# Patient Record
Sex: FEMALE | Race: WHITE | NOT HISPANIC OR LATINO | Employment: OTHER | URBAN - METROPOLITAN AREA
[De-identification: names, ages, dates, MRNs, and addresses within clinical notes are randomized per-mention and may not be internally consistent; named-entity substitution may affect disease eponyms.]

---

## 2023-09-15 ENCOUNTER — HOSPITAL ENCOUNTER (OUTPATIENT)
Dept: DATA CONVERSION | Facility: HOSPITAL | Age: 83
Discharge: HOME | End: 2023-09-15
Payer: MEDICARE

## 2023-09-15 DIAGNOSIS — Z87.891 PERSONAL HISTORY OF NICOTINE DEPENDENCE: ICD-10-CM

## 2023-09-15 DIAGNOSIS — S00.93XA CONTUSION OF UNSPECIFIED PART OF HEAD, INITIAL ENCOUNTER: ICD-10-CM
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
## 2023-09-15 DIAGNOSIS — S06.0X9A CONCUSSION WITH LOSS OF CONSCIOUSNESS OF UNSPECIFIED DURATION, INITIAL ENCOUNTER: ICD-10-CM

## 2023-09-15 DIAGNOSIS — S06.9X9A UNSPECIFIED INTRACRANIAL INJURY WITH LOSS OF CONSCIOUSNESS OF UNSPECIFIED DURATION, INITIAL ENCOUNTER (MULTI): ICD-10-CM

## 2023-09-15 DIAGNOSIS — Z79.01 LONG TERM (CURRENT) USE OF ANTICOAGULANTS: ICD-10-CM

## 2023-09-15 LAB
ANION GAP SERPL CALCULATED.3IONS-SCNC: 11 MMOL/L (ref 0–19)
ANTICOAGULANT: NORMAL
BASOPHILS # BLD AUTO: 0.08 K/UL (ref 0–0.22)
BASOPHILS NFR BLD AUTO: 1 % (ref 0–1)
BUN SERPL-MCNC: 19 MG/DL (ref 8–25)
BUN/CREAT SERPL: 38 RATIO (ref 8–21)
CALCIUM SERPL-MCNC: 9.4 MG/DL (ref 8.5–10.4)
CHLORIDE SERPL-SCNC: 102 MMOL/L (ref 97–107)
CO2 SERPL-SCNC: 27 MMOL/L (ref 24–31)
CREAT SERPL-MCNC: 0.5 MG/DL (ref 0.4–1.6)
DEPRECATED RDW RBC AUTO: 39.5 FL (ref 37–54)
DIFFERENTIAL METHOD BLD: ABNORMAL
EOSINOPHIL # BLD AUTO: 0.27 K/UL (ref 0–0.45)
EOSINOPHIL NFR BLD: 3.4 % (ref 0–3)
ERYTHROCYTE [DISTWIDTH] IN BLOOD BY AUTOMATED COUNT: 13.2 % (ref 11.7–15)
GFR SERPL CREATININE-BSD FRML MDRD: 92 ML/MIN/1.73 M2
GLUCOSE SERPL-MCNC: 208 MG/DL (ref 65–99)
HCT VFR BLD AUTO: 37.2 % (ref 36–44)
HGB BLD-MCNC: 11.8 GM/DL (ref 12–15)
IMM GRANULOCYTES # BLD AUTO: 0.05 K/UL (ref 0–0.1)
INR PPP: 1 (ref 0.86–1.16)
LYMPHOCYTES # BLD AUTO: 1.72 K/UL (ref 1.2–3.2)
LYMPHOCYTES NFR BLD MANUAL: 21.5 % (ref 20–40)
MCH RBC QN AUTO: 26.1 PG (ref 26–34)
MCHC RBC AUTO-ENTMCNC: 31.7 % (ref 31–37)
MCV RBC AUTO: 82.3 FL (ref 80–100)
MONOCYTES # BLD AUTO: 0.69 K/UL (ref 0–0.8)
MONOCYTES NFR BLD MANUAL: 8.6 % (ref 0–8)
NEUTROPHILS # BLD AUTO: 5.18 K/UL
NEUTROPHILS # BLD AUTO: 5.18 K/UL (ref 1.8–7.7)
NEUTROPHILS.IMMATURE NFR BLD: 0.6 % (ref 0–1)
NEUTS SEG NFR BLD: 64.9 % (ref 50–70)
NRBC BLD-RTO: 0 /100 WBC
PLATELET # BLD AUTO: 283 K/UL (ref 150–450)
PMV BLD AUTO: 10.4 CU (ref 7–12.6)
POTASSIUM SERPL-SCNC: 4 MMOL/L (ref 3.4–5.1)
PROTHROMBIN TIME: 10.4 SEC (ref 9.3–12.7)
RBC # BLD AUTO: 4.52 M/UL (ref 4–4.9)
SODIUM SERPL-SCNC: 140 MMOL/L (ref 133–145)
WBC # BLD AUTO: 8 K/UL (ref 4.5–11)

## 2023-11-17 ENCOUNTER — NURSING HOME VISIT (OUTPATIENT)
Dept: POST ACUTE CARE | Facility: EXTERNAL LOCATION | Age: 83
End: 2023-11-17
Payer: MEDICARE

## 2023-11-17 DIAGNOSIS — I10 ESSENTIAL HYPERTENSION: ICD-10-CM

## 2023-11-17 DIAGNOSIS — M17.0 PRIMARY OSTEOARTHRITIS OF BOTH KNEES: ICD-10-CM

## 2023-11-17 DIAGNOSIS — R41.0 CONFUSION AND DISORIENTATION: Primary | ICD-10-CM

## 2023-11-17 DIAGNOSIS — F41.9 ANXIETY: ICD-10-CM

## 2023-11-17 DIAGNOSIS — I48.20 CHRONIC ATRIAL FIBRILLATION (MULTI): ICD-10-CM

## 2023-11-17 PROCEDURE — 99349 HOME/RES VST EST MOD MDM 40: CPT

## 2023-11-20 VITALS
DIASTOLIC BLOOD PRESSURE: 68 MMHG | RESPIRATION RATE: 16 BRPM | HEART RATE: 80 BPM | SYSTOLIC BLOOD PRESSURE: 140 MMHG | OXYGEN SATURATION: 99 %

## 2023-11-20 PROBLEM — M16.10 HIP ARTHRITIS: Status: ACTIVE | Noted: 2023-11-20

## 2023-11-20 PROBLEM — R09.89 LABILE HYPERTENSION DUE TO CLINICAL ENVIRONMENT: Status: ACTIVE | Noted: 2023-11-20

## 2023-11-20 PROBLEM — R42 VERTIGO: Status: ACTIVE | Noted: 2023-11-20

## 2023-11-20 PROBLEM — I48.20 CHRONIC ATRIAL FIBRILLATION (MULTI): Status: ACTIVE | Noted: 2023-11-20

## 2023-11-20 PROBLEM — K21.9 GASTROESOPHAGEAL REFLUX DISEASE WITHOUT ESOPHAGITIS: Status: ACTIVE | Noted: 2023-11-20

## 2023-11-20 PROBLEM — M47.816 LUMBAR SPONDYLOSIS: Status: ACTIVE | Noted: 2023-11-20

## 2023-11-20 PROBLEM — R41.3 POOR SHORT TERM MEMORY: Status: ACTIVE | Noted: 2023-11-20

## 2023-11-20 PROBLEM — M81.0 AGE-RELATED OSTEOPOROSIS WITHOUT CURRENT PATHOLOGICAL FRACTURE: Status: ACTIVE | Noted: 2023-11-20

## 2023-11-20 PROBLEM — D64.9 ANEMIA: Status: ACTIVE | Noted: 2023-11-20

## 2023-11-20 PROBLEM — R01.1 HEART MURMUR: Status: ACTIVE | Noted: 2023-11-20

## 2023-11-20 PROBLEM — G47.33 OBSTRUCTIVE SLEEP APNEA SYNDROME: Status: ACTIVE | Noted: 2023-11-20

## 2023-11-20 PROBLEM — F41.9 ANXIETY: Status: ACTIVE | Noted: 2023-11-20

## 2023-11-20 PROBLEM — R41.82 ALTERED MENTAL STATUS: Status: ACTIVE | Noted: 2023-11-20

## 2023-11-20 PROBLEM — M17.0 PRIMARY OSTEOARTHRITIS OF BOTH KNEES: Status: ACTIVE | Noted: 2023-11-20

## 2023-11-20 PROBLEM — E11.9 DIABETES MELLITUS WITHOUT COMPLICATION (MULTI): Status: ACTIVE | Noted: 2023-11-20

## 2023-11-20 PROBLEM — M19.019 SHOULDER ARTHRITIS: Status: ACTIVE | Noted: 2023-11-20

## 2023-11-20 PROBLEM — I10 ESSENTIAL HYPERTENSION: Status: ACTIVE | Noted: 2023-11-20

## 2023-11-20 PROBLEM — D50.8 IRON DEFICIENCY ANEMIA SECONDARY TO INADEQUATE DIETARY IRON INTAKE: Status: ACTIVE | Noted: 2023-11-20

## 2023-11-20 PROBLEM — R40.1 CLOUDED CONSCIOUSNESS: Status: ACTIVE | Noted: 2023-11-20

## 2023-11-20 PROBLEM — I83.93 VARICOSE VEINS OF BOTH LOWER EXTREMITIES: Status: ACTIVE | Noted: 2023-11-20

## 2023-11-20 PROBLEM — E78.2 MIXED HYPERLIPIDEMIA: Status: ACTIVE | Noted: 2023-11-20

## 2023-11-20 PROBLEM — E55.9 VITAMIN D DEFICIENCY: Status: ACTIVE | Noted: 2023-11-20

## 2023-11-20 PROBLEM — I48.0 PAROXYSMAL ATRIAL FIBRILLATION (MULTI): Status: ACTIVE | Noted: 2023-11-20

## 2023-11-20 PROBLEM — E78.5 DYSLIPIDEMIA: Status: ACTIVE | Noted: 2023-11-20

## 2023-11-20 PROBLEM — E03.8 SUBCLINICAL HYPOTHYROIDISM: Status: ACTIVE | Noted: 2023-11-20

## 2023-11-20 PROBLEM — L20.84 INTRINSIC ECZEMA: Status: ACTIVE | Noted: 2023-11-20

## 2023-11-20 PROBLEM — M17.12 PRIMARY OSTEOARTHRITIS OF LEFT KNEE: Status: ACTIVE | Noted: 2023-11-20

## 2023-11-20 RX ORDER — APIXABAN 5 MG/1
TABLET, FILM COATED ORAL 2 TIMES DAILY
Status: ON HOLD | COMMUNITY
Start: 2019-03-06 | End: 2024-03-31 | Stop reason: SINTOL

## 2023-11-20 RX ORDER — ASPIRIN 81 MG/1
81 TABLET ORAL ONCE
COMMUNITY

## 2023-11-20 RX ORDER — METFORMIN HYDROCHLORIDE 500 MG/1
500 TABLET, EXTENDED RELEASE ORAL
COMMUNITY
Start: 2016-06-10

## 2023-11-20 RX ORDER — LANOLIN ALCOHOL/MO/W.PET/CERES
1000 CREAM (GRAM) TOPICAL DAILY
COMMUNITY

## 2023-11-20 RX ORDER — GUAIFENESIN 600 MG/1
600 TABLET, EXTENDED RELEASE ORAL 2 TIMES DAILY PRN
COMMUNITY
End: 2024-04-05 | Stop reason: HOSPADM

## 2023-11-20 RX ORDER — ACETAMINOPHEN 325 MG/1
650 TABLET ORAL EVERY 4 HOURS PRN
COMMUNITY

## 2023-11-20 RX ORDER — ATORVASTATIN CALCIUM 10 MG/1
10 TABLET, FILM COATED ORAL DAILY
COMMUNITY
Start: 2013-05-15

## 2023-11-20 RX ORDER — HYDROXYCHLOROQUINE SULFATE 200 MG/1
200 TABLET ORAL 2 TIMES DAILY
COMMUNITY
Start: 2022-01-10

## 2023-11-20 RX ORDER — HYDRALAZINE HYDROCHLORIDE 25 MG/1
25 TABLET, FILM COATED ORAL 2 TIMES DAILY
COMMUNITY
End: 2024-04-05 | Stop reason: HOSPADM

## 2023-11-20 RX ORDER — ACETAMINOPHEN 500 MG
1 TABLET ORAL DAILY
COMMUNITY
Start: 2013-01-09

## 2023-11-20 RX ORDER — LOSARTAN POTASSIUM 100 MG/1
100 TABLET ORAL DAILY
COMMUNITY
Start: 2017-11-17 | End: 2024-04-05 | Stop reason: HOSPADM

## 2023-11-20 RX ORDER — GUAIFENESIN 100 MG/5ML
200 SOLUTION ORAL EVERY 4 HOURS PRN
COMMUNITY
End: 2024-04-05 | Stop reason: HOSPADM

## 2023-11-20 RX ORDER — ADHESIVE BANDAGE
30 BANDAGE TOPICAL NIGHTLY PRN
COMMUNITY
End: 2024-04-05 | Stop reason: HOSPADM

## 2023-11-20 RX ORDER — ISOSORBIDE MONONITRATE 30 MG/1
30 TABLET, EXTENDED RELEASE ORAL DAILY
COMMUNITY
Start: 2022-05-20

## 2023-11-20 ASSESSMENT — ENCOUNTER SYMPTOMS
FATIGUE: 0
CONSTIPATION: 0
FEVER: 0
CHILLS: 0
FREQUENCY: 0
COUGH: 0
SHORTNESS OF BREATH: 0
CONFUSION: 1
DYSURIA: 0

## 2023-11-21 NOTE — PROGRESS NOTES
"Subjective   Patient ID: Estela Carnes is a 83 y.o. female who is assisted living/ home patient being seen and evaluated for reported increased confusion.     HPI    Pt visited in assisted living for reports of increased confusion and attempting to go into apartments at AL. Pt oriented x1, comfortable and denies pain. Pt does not recall attempting to enter other apartments. Pt denies fever, chills, headache, dizziness. Pt denies voiding symptoms although it was reported pt told nurse she is dribbling and not emptying bladder. Pt unable to answer all questions appropriately. Pt will alternate between answers, and state \"yes, no maybe, well I don't know\". HPI limited d/t cognition. Collateral information obtained from nursing.     Current Outpatient Medications on File Prior to Visit   Medication Sig Dispense Refill    aspirin 81 mg EC tablet Take 1 tablet (81 mg) by mouth 1 time.      atorvastatin (Lipitor) 10 mg tablet Take 1 tablet (10 mg) by mouth once daily.      cholecalciferol (Vitamin D-3) 5,000 Units tablet Take 1 capsule by mouth once daily.      cyanocobalamin (Vitamin B-12) 1,000 mcg tablet Take 1 tablet (1,000 mcg) by mouth once daily.      Eliquis 5 mg tablet Take by mouth 2 times a day.      guaiFENesin (Mucinex) 600 mg 12 hr tablet Take 1 tablet (600 mg) by mouth 2 times a day as needed for cough or congestion. Do not crush, chew, or split.      guaiFENesin (Robitussin) 100 mg/5 mL syrup Take 10 mL (200 mg) by mouth every 4 hours if needed for cough.      hydrALAZINE (Apresoline) 25 mg tablet Take 1 tablet (25 mg) by mouth 2 times a day.      isosorbide mononitrate ER (Imdur) 30 mg 24 hr tablet Take 1 tablet (30 mg) by mouth once daily.      losartan (Cozaar) 100 mg tablet Take 1 tablet (100 mg) by mouth once daily.      magnesium hydroxide (Milk of Magnesia) 400 mg/5 mL suspension Take 30 mL by mouth as needed at bedtime for constipation.      metFORMIN  mg 24 hr tablet Take 1 tablet (500 mg) " by mouth 2 times a day with meals.      PlaqueniL 200 mg tablet Take 1 tablet (200 mg) by mouth 2 times a day.      vit C/E/Zn/coppr/lutein/zeaxan (PRESERVISION AREDS-2 ORAL) Take 1 tablet by mouth once daily.      acetaminophen (Tylenol) 325 mg tablet Take 2 tablets (650 mg) by mouth every 4 hours if needed for headaches, fever (temp greater than 38.0 C) or mild pain (1 - 3).       No current facility-administered medications on file prior to visit.         Review of Systems   Constitutional:  Negative for chills, fatigue and fever.   Eyes:  Negative for visual disturbance.   Respiratory:  Negative for cough and shortness of breath.    Cardiovascular:  Negative for chest pain and leg swelling.   Gastrointestinal:  Negative for constipation.   Genitourinary:  Negative for dysuria and frequency.   Psychiatric/Behavioral:  Positive for confusion.        Objective   There were no vitals taken for this visit.    Physical Exam  Constitutional:       General: She is not in acute distress.  HENT:      Head: Normocephalic.      Nose: Nose normal.      Mouth/Throat:      Mouth: Mucous membranes are moist.   Eyes:      Conjunctiva/sclera: Conjunctivae normal.      Pupils: Pupils are equal, round, and reactive to light.   Cardiovascular:      Rate and Rhythm: Normal rate and regular rhythm.      Pulses: Normal pulses.      Heart sounds: Normal heart sounds.   Pulmonary:      Effort: Pulmonary effort is normal. No respiratory distress.      Breath sounds: Normal breath sounds.   Abdominal:      General: Bowel sounds are normal.   Musculoskeletal:         General: No swelling. Normal range of motion.      Cervical back: Normal range of motion.   Skin:     General: Skin is warm.      Capillary Refill: Capillary refill takes less than 2 seconds.   Neurological:      Mental Status: She is alert. She is disoriented.      Comments: Oriented x1, appears to be at baseline today.    Psychiatric:         Mood and Affect: Mood normal.          Assessment/Plan   Diagnoses and all orders for this visit:  Confusion and disorientation  Comments:  Facility to obtain UA/Culture  Chronic atrial fibrillation (CMS/HCC)  Comments:  Heart rate controlled at today's visit.  Essential hypertension  Comments:  Blood pressure controlled.  Anxiety  Comments:  Continue to monitor for signs/symptoms of anxiety.  Primary osteoarthritis of both knees  Comments:  Pain controlled. Pt currently working with PT.

## 2023-12-15 ENCOUNTER — NURSING HOME VISIT (OUTPATIENT)
Dept: POST ACUTE CARE | Facility: EXTERNAL LOCATION | Age: 83
End: 2023-12-15
Payer: MEDICARE

## 2023-12-15 VITALS
OXYGEN SATURATION: 99 % | HEART RATE: 91 BPM | RESPIRATION RATE: 16 BRPM | DIASTOLIC BLOOD PRESSURE: 74 MMHG | SYSTOLIC BLOOD PRESSURE: 143 MMHG

## 2023-12-15 DIAGNOSIS — E11.9 DIABETES MELLITUS WITHOUT COMPLICATION (MULTI): ICD-10-CM

## 2023-12-15 DIAGNOSIS — E55.9 VITAMIN D DEFICIENCY: ICD-10-CM

## 2023-12-15 DIAGNOSIS — R41.3 POOR SHORT TERM MEMORY: Primary | ICD-10-CM

## 2023-12-15 DIAGNOSIS — E78.2 MIXED HYPERLIPIDEMIA: ICD-10-CM

## 2023-12-15 DIAGNOSIS — M15.9 PRIMARY OSTEOARTHRITIS INVOLVING MULTIPLE JOINTS: ICD-10-CM

## 2023-12-15 PROCEDURE — 99349 HOME/RES VST EST MOD MDM 40: CPT

## 2023-12-15 ASSESSMENT — ENCOUNTER SYMPTOMS
CONSTIPATION: 0
SHORTNESS OF BREATH: 0
FATIGUE: 0
RHINORRHEA: 0
HEADACHES: 0
FREQUENCY: 0
CHILLS: 0
WEAKNESS: 1
ARTHRALGIAS: 1
DYSURIA: 0
COUGH: 0
FEVER: 0
MYALGIAS: 1
SLEEP DISTURBANCE: 0
DIZZINESS: 0
CONFUSION: 1

## 2023-12-15 ASSESSMENT — PAIN SCALES - GENERAL: PAINLEVEL: 0-NO PAIN

## 2023-12-16 NOTE — PROGRESS NOTES
Subjective   Patient ID: Estela Carnes is a 83 y.o. female who is being seen at [facility] for Annual AL Re-certification.     HPI   Pt visited in apartment for Annual AL Re-certification assessment. Pt oriented x1, comfortable and denies pain. Pt unable to state year born right away, and looked to spouse whether or not she was correct. Pt wears glasses, denies changes in vision and hearing. Pt denies difficulty with chewing and swallowing, admits appetite is good. Pt denies fever, chills, headache, dizziness, congestion and runny nose. Spouse states pt's cognition is declining, and memory isn't what it use to be.     Pt denies chest pain, and sob at rest and exertion. Pt denies voiding symptoms and constipation. Nursing reports been has been incontinent of urine. Pt ambulates with walker, and is currently working with physical therapy. Pt to get a new walker with brakes so she doesn't fall forward with current walker. Pt to work with PT to learn how to operate the hand brakes, and determine if appropriate for patient due to patient's impaired cognition. Pt denies sleep disturbances. Pt denies feelings of anxiety and sadness.     Current Outpatient Medications on File Prior to Visit   Medication Sig Dispense Refill    acetaminophen (Tylenol) 325 mg tablet Take 2 tablets (650 mg) by mouth every 4 hours if needed for headaches, fever (temp greater than 38.0 C) or mild pain (1 - 3).      aspirin 81 mg EC tablet Take 1 tablet (81 mg) by mouth 1 time.      atorvastatin (Lipitor) 10 mg tablet Take 1 tablet (10 mg) by mouth once daily.      cholecalciferol (Vitamin D-3) 5,000 Units tablet Take 1 capsule by mouth once daily.      cyanocobalamin (Vitamin B-12) 1,000 mcg tablet Take 1 tablet (1,000 mcg) by mouth once daily.      Eliquis 5 mg tablet Take by mouth 2 times a day.      guaiFENesin (Mucinex) 600 mg 12 hr tablet Take 1 tablet (600 mg) by mouth 2 times a day as needed for cough or congestion. Do not  crush, chew, or split.      guaiFENesin (Robitussin) 100 mg/5 mL syrup Take 10 mL (200 mg) by mouth every 4 hours if needed for cough.      hydrALAZINE (Apresoline) 25 mg tablet Take 1 tablet (25 mg) by mouth 2 times a day.      isosorbide mononitrate ER (Imdur) 30 mg 24 hr tablet Take 1 tablet (30 mg) by mouth once daily.      losartan (Cozaar) 100 mg tablet Take 1 tablet (100 mg) by mouth once daily.      magnesium hydroxide (Milk of Magnesia) 400 mg/5 mL suspension Take 30 mL by mouth as needed at bedtime for constipation.      metFORMIN  mg 24 hr tablet Take 1 tablet (500 mg) by mouth 2 times a day with meals.      PlaqueniL 200 mg tablet Take 1 tablet (200 mg) by mouth 2 times a day.      vit C/E/Zn/coppr/lutein/zeaxan (PRESERVISION AREDS-2 ORAL) Take 1 tablet by mouth once daily.       No current facility-administered medications on file prior to visit.      Review of Systems   Constitutional:  Negative for chills, fatigue and fever.   HENT:  Negative for congestion and rhinorrhea.    Eyes:  Negative for visual disturbance.   Respiratory:  Negative for cough and shortness of breath.    Cardiovascular:  Negative for chest pain and leg swelling.   Gastrointestinal:  Negative for constipation.   Genitourinary:  Negative for dysuria and frequency.   Musculoskeletal:  Positive for arthralgias, gait problem and myalgias.   Neurological:  Positive for weakness. Negative for dizziness and headaches.   Psychiatric/Behavioral:  Positive for confusion. Negative for sleep disturbance.        Objective   /74   Pulse 91   Resp 16   SpO2 99%     Physical Exam  Constitutional:       General: She is not in acute distress.  HENT:      Head: Normocephalic.      Nose: Nose normal. No congestion or rhinorrhea.      Mouth/Throat:      Mouth: Mucous membranes are moist.   Eyes:      Conjunctiva/sclera: Conjunctivae normal.      Pupils: Pupils are equal, round, and reactive to light.   Cardiovascular:      Rate and  Rhythm: Normal rate and regular rhythm.      Pulses: Normal pulses.      Heart sounds: Normal heart sounds.   Pulmonary:      Effort: Pulmonary effort is normal. No respiratory distress.      Breath sounds: Normal breath sounds. No wheezing or rhonchi.   Abdominal:      General: Bowel sounds are normal.   Musculoskeletal:         General: No swelling. Normal range of motion.      Cervical back: Normal range of motion.      Right lower leg: No edema.      Left lower leg: No edema.   Skin:     General: Skin is warm.      Capillary Refill: Capillary refill takes less than 2 seconds.   Neurological:      Mental Status: She is alert. Mental status is at baseline. She is disoriented.      Comments: Oriented x1, appears to be at baseline today.    Psychiatric:         Attention and Perception: Attention normal.         Mood and Affect: Mood normal.         Speech: Speech normal.         Cognition and Memory: Cognition is impaired. Memory is impaired.         Assessment/Plan   Diagnoses and all orders for this visit:  Poor short term memory  Comments:  Continue to monitor for cognitive decline.  Primary osteoarthritis involving multiple joints  Comments:  Continue working with PT.  Diabetes mellitus without complication (CMS/[facility])  Comments:  Facility to obtain HgbA1c.  Mixed hyperlipidemia  Comments:  Facility to obtain Lipid panel.  Vitamin D deficiency  Comments:  Facility to obtain Vit D level.

## 2023-12-28 ENCOUNTER — APPOINTMENT (OUTPATIENT)
Dept: RADIOLOGY | Facility: HOSPITAL | Age: 83
End: 2023-12-28
Payer: MEDICARE

## 2023-12-28 ENCOUNTER — APPOINTMENT (OUTPATIENT)
Dept: CARDIOLOGY | Facility: HOSPITAL | Age: 83
End: 2023-12-28
Payer: MEDICARE

## 2023-12-28 ENCOUNTER — HOSPITAL ENCOUNTER (EMERGENCY)
Facility: HOSPITAL | Age: 83
Discharge: HOME | End: 2023-12-28
Attending: EMERGENCY MEDICINE
Payer: MEDICARE

## 2023-12-28 VITALS
RESPIRATION RATE: 15 BRPM | DIASTOLIC BLOOD PRESSURE: 68 MMHG | SYSTOLIC BLOOD PRESSURE: 145 MMHG | BODY MASS INDEX: 26.5 KG/M2 | HEART RATE: 79 BPM | HEIGHT: 64 IN | WEIGHT: 155.2 LBS | OXYGEN SATURATION: 99 % | TEMPERATURE: 98.4 F

## 2023-12-28 DIAGNOSIS — R55 SYNCOPE AND COLLAPSE: ICD-10-CM

## 2023-12-28 DIAGNOSIS — M54.50 ACUTE RIGHT-SIDED LOW BACK PAIN WITHOUT SCIATICA: Primary | ICD-10-CM

## 2023-12-28 DIAGNOSIS — R10.9 ABDOMINAL PAIN, UNSPECIFIED ABDOMINAL LOCATION: ICD-10-CM

## 2023-12-28 LAB
ALBUMIN SERPL-MCNC: 3.6 G/DL (ref 3.5–5)
ALP BLD-CCNC: 96 U/L (ref 35–125)
ALT SERPL-CCNC: 20 U/L (ref 5–40)
ANION GAP SERPL CALC-SCNC: 14 MMOL/L
APPEARANCE UR: CLEAR
AST SERPL-CCNC: 29 U/L (ref 5–40)
BASOPHILS # BLD AUTO: 0.08 X10*3/UL (ref 0–0.1)
BASOPHILS NFR BLD AUTO: 0.7 %
BILIRUB SERPL-MCNC: 0.3 MG/DL (ref 0.1–1.2)
BILIRUB UR STRIP.AUTO-MCNC: NEGATIVE MG/DL
BUN SERPL-MCNC: 15 MG/DL (ref 8–25)
CALCIUM SERPL-MCNC: 9.2 MG/DL (ref 8.5–10.4)
CHLORIDE SERPL-SCNC: 101 MMOL/L (ref 97–107)
CO2 SERPL-SCNC: 25 MMOL/L (ref 24–31)
COLOR UR: ABNORMAL
CREAT SERPL-MCNC: 0.6 MG/DL (ref 0.4–1.6)
EOSINOPHIL # BLD AUTO: 0.29 X10*3/UL (ref 0–0.4)
EOSINOPHIL NFR BLD AUTO: 2.4 %
ERYTHROCYTE [DISTWIDTH] IN BLOOD BY AUTOMATED COUNT: 12.6 % (ref 11.5–14.5)
GFR SERPL CREATININE-BSD FRML MDRD: 89 ML/MIN/1.73M*2
GLUCOSE BLD MANUAL STRIP-MCNC: 114 MG/DL (ref 74–99)
GLUCOSE SERPL-MCNC: 280 MG/DL (ref 65–99)
GLUCOSE UR STRIP.AUTO-MCNC: NORMAL MG/DL
HCT VFR BLD AUTO: 36.4 % (ref 36–46)
HGB BLD-MCNC: 11.8 G/DL (ref 12–16)
IMM GRANULOCYTES # BLD AUTO: 0.04 X10*3/UL (ref 0–0.5)
IMM GRANULOCYTES NFR BLD AUTO: 0.3 % (ref 0–0.9)
KETONES UR STRIP.AUTO-MCNC: NEGATIVE MG/DL
LACTATE BLDV-SCNC: 2 MMOL/L (ref 0.4–2)
LEUKOCYTE ESTERASE UR QL STRIP.AUTO: NEGATIVE
LIPASE SERPL-CCNC: 25 U/L (ref 16–63)
LYMPHOCYTES # BLD AUTO: 1.62 X10*3/UL (ref 0.8–3)
LYMPHOCYTES NFR BLD AUTO: 13.4 %
MCH RBC QN AUTO: 27.1 PG (ref 26–34)
MCHC RBC AUTO-ENTMCNC: 32.4 G/DL (ref 32–36)
MCV RBC AUTO: 84 FL (ref 80–100)
MONOCYTES # BLD AUTO: 0.9 X10*3/UL (ref 0.05–0.8)
MONOCYTES NFR BLD AUTO: 7.5 %
NEUTROPHILS # BLD AUTO: 9.14 X10*3/UL (ref 1.6–5.5)
NEUTROPHILS NFR BLD AUTO: 75.7 %
NITRITE UR QL STRIP.AUTO: NEGATIVE
NRBC BLD-RTO: 0 /100 WBCS (ref 0–0)
PH UR STRIP.AUTO: 7 [PH]
PLATELET # BLD AUTO: 303 X10*3/UL (ref 150–450)
POTASSIUM SERPL-SCNC: 4.2 MMOL/L (ref 3.4–5.1)
PROT SERPL-MCNC: 7.1 G/DL (ref 5.9–7.9)
PROT UR STRIP.AUTO-MCNC: ABNORMAL MG/DL
RBC # BLD AUTO: 4.36 X10*6/UL (ref 4–5.2)
RBC # UR STRIP.AUTO: NEGATIVE /UL
RBC #/AREA URNS AUTO: NORMAL /HPF
SODIUM SERPL-SCNC: 140 MMOL/L (ref 133–145)
SP GR UR STRIP.AUTO: 1.05
TROPONIN T SERPL-MCNC: 37 NG/L
TROPONIN T SERPL-MCNC: 37 NG/L
TROPONIN T SERPL-MCNC: 41 NG/L
UROBILINOGEN UR STRIP.AUTO-MCNC: NORMAL MG/DL
WBC # BLD AUTO: 12.1 X10*3/UL (ref 4.4–11.3)
WBC #/AREA URNS AUTO: NORMAL /HPF

## 2023-12-28 PROCEDURE — 84484 ASSAY OF TROPONIN QUANT: CPT | Mod: 91 | Performed by: EMERGENCY MEDICINE

## 2023-12-28 PROCEDURE — 70450 CT HEAD/BRAIN W/O DYE: CPT

## 2023-12-28 PROCEDURE — 2550000001 HC RX 255 CONTRASTS: Performed by: EMERGENCY MEDICINE

## 2023-12-28 PROCEDURE — 83690 ASSAY OF LIPASE: CPT | Performed by: EMERGENCY MEDICINE

## 2023-12-28 PROCEDURE — 84484 ASSAY OF TROPONIN QUANT: CPT | Performed by: EMERGENCY MEDICINE

## 2023-12-28 PROCEDURE — 36415 COLL VENOUS BLD VENIPUNCTURE: CPT | Performed by: EMERGENCY MEDICINE

## 2023-12-28 PROCEDURE — 93005 ELECTROCARDIOGRAM TRACING: CPT

## 2023-12-28 PROCEDURE — 80053 COMPREHEN METABOLIC PANEL: CPT | Performed by: EMERGENCY MEDICINE

## 2023-12-28 PROCEDURE — 99285 EMERGENCY DEPT VISIT HI MDM: CPT | Performed by: EMERGENCY MEDICINE

## 2023-12-28 PROCEDURE — 85025 COMPLETE CBC W/AUTO DIFF WBC: CPT | Performed by: EMERGENCY MEDICINE

## 2023-12-28 PROCEDURE — 82947 ASSAY GLUCOSE BLOOD QUANT: CPT

## 2023-12-28 PROCEDURE — 81003 URINALYSIS AUTO W/O SCOPE: CPT | Performed by: EMERGENCY MEDICINE

## 2023-12-28 PROCEDURE — 83605 ASSAY OF LACTIC ACID: CPT | Performed by: EMERGENCY MEDICINE

## 2023-12-28 PROCEDURE — 74177 CT ABD & PELVIS W/CONTRAST: CPT

## 2023-12-28 PROCEDURE — 2500000005 HC RX 250 GENERAL PHARMACY W/O HCPCS: Performed by: EMERGENCY MEDICINE

## 2023-12-28 RX ORDER — LIDOCAINE 560 MG/1
1 PATCH PERCUTANEOUS; TOPICAL; TRANSDERMAL ONCE
Status: DISCONTINUED | OUTPATIENT
Start: 2023-12-28 | End: 2023-12-28 | Stop reason: HOSPADM

## 2023-12-28 RX ADMIN — IOHEXOL 75 ML: 350 INJECTION, SOLUTION INTRAVENOUS at 11:25

## 2023-12-28 RX ADMIN — LIDOCAINE 1 PATCH: 4 PATCH TOPICAL at 16:45

## 2023-12-28 SDOH — SOCIAL STABILITY: SOCIAL INSECURITY
WITHIN THE LAST YEAR, HAVE TO BEEN RAPED OR FORCED TO HAVE ANY KIND OF SEXUAL ACTIVITY BY YOUR PARTNER OR EX-PARTNER?: NO

## 2023-12-28 SDOH — SOCIAL STABILITY: SOCIAL INSECURITY: WITHIN THE LAST YEAR, HAVE YOU BEEN AFRAID OF YOUR PARTNER OR EX-PARTNER?: NO

## 2023-12-28 SDOH — ECONOMIC STABILITY: TRANSPORTATION INSECURITY
IN THE PAST 12 MONTHS, HAS THE LACK OF TRANSPORTATION KEPT YOU FROM MEDICAL APPOINTMENTS OR FROM GETTING MEDICATIONS?: NO

## 2023-12-28 SDOH — ECONOMIC STABILITY: HOUSING INSECURITY
IN THE LAST 12 MONTHS, WAS THERE A TIME WHEN YOU DID NOT HAVE A STEADY PLACE TO SLEEP OR SLEPT IN A SHELTER (INCLUDING NOW)?: NO

## 2023-12-28 SDOH — ECONOMIC STABILITY: INCOME INSECURITY: IN THE PAST 12 MONTHS, HAS THE ELECTRIC, GAS, OIL, OR WATER COMPANY THREATENED TO SHUT OFF SERVICE IN YOUR HOME?: NO

## 2023-12-28 SDOH — ECONOMIC STABILITY: INCOME INSECURITY: IN THE LAST 12 MONTHS, WAS THERE A TIME WHEN YOU WERE NOT ABLE TO PAY THE MORTGAGE OR RENT ON TIME?: NO

## 2023-12-28 SDOH — SOCIAL STABILITY: SOCIAL INSECURITY: WITHIN THE LAST YEAR, HAVE YOU BEEN HUMILIATED OR EMOTIONALLY ABUSED IN OTHER WAYS BY YOUR PARTNER OR EX-PARTNER?: NO

## 2023-12-28 SDOH — ECONOMIC STABILITY: FOOD INSECURITY: WITHIN THE PAST 12 MONTHS, THE FOOD YOU BOUGHT JUST DIDN'T LAST AND YOU DIDN'T HAVE MONEY TO GET MORE.: NEVER TRUE

## 2023-12-28 SDOH — ECONOMIC STABILITY: FOOD INSECURITY: WITHIN THE PAST 12 MONTHS, YOU WORRIED THAT YOUR FOOD WOULD RUN OUT BEFORE YOU GOT MONEY TO BUY MORE.: NEVER TRUE

## 2023-12-28 SDOH — SOCIAL STABILITY: SOCIAL INSECURITY
WITHIN THE LAST YEAR, HAVE YOU BEEN KICKED, HIT, SLAPPED, OR OTHERWISE PHYSICALLY HURT BY YOUR PARTNER OR EX-PARTNER?: NO

## 2023-12-28 SDOH — ECONOMIC STABILITY: TRANSPORTATION INSECURITY
IN THE PAST 12 MONTHS, HAS LACK OF TRANSPORTATION KEPT YOU FROM MEETINGS, WORK, OR FROM GETTING THINGS NEEDED FOR DAILY LIVING?: NO

## 2023-12-28 ASSESSMENT — PAIN - FUNCTIONAL ASSESSMENT: PAIN_FUNCTIONAL_ASSESSMENT: 0-10

## 2023-12-28 NOTE — PROGRESS NOTES
"   12/28/23 1425   Housing Stability   In the last 12 months, was there a time when you were not able to pay the mortgage or rent on time? N   In the last 12 months, was there a time when you did not have a steady place to sleep or slept in a shelter (including now)? N   Transportation Needs   In the past 12 months, has lack of transportation kept you from medical appointments or from getting medications? no   In the past 12 months, has lack of transportation kept you from meetings, work, or from getting things needed for daily living? No   Food Insecurity   Within the past 12 months, you worried that your food would run out before you got the money to buy more. Never true   Within the past 12 months, the food you bought just didn't last and you didn't have money to get more. Never true   Intimate Partner Violence   Within the last year, have you been afraid of your partner or ex-partner? No   Within the last year, have you been humiliated or emotionally abused in other ways by your partner or ex-partner? No   Within the last year, have you been kicked, hit, slapped, or otherwise physically hurt by your partner or ex-partner? No   Within the last year, have you been raped or forced to have any kind of sexual activity by your partner or ex-partner? No   Utilities   In the past 12 months has the electric, gas, oil, or water company threatened to shut off services in your home? No         Estela Carnes is a 83 y.o. female on day 0 of admission presenting with No Principal Problem: There is no principal problem currently on the Problem List. Please update the Problem List and refresh..    Subjective          Objective     Physical Exam    Last Recorded Vitals  Blood pressure 156/73, pulse 72, temperature 36.7 °C (98 °F), resp. rate 16, height 1.626 m (5' 4\"), weight 70.4 kg (155 lb 3.3 oz), SpO2 99 %.  Intake/Output last 3 Shifts:  No intake/output data recorded.    Relevant Results                       "       Assessment/Plan   Active Problems:  There are no active Hospital Problems.               SENAIT White

## 2023-12-28 NOTE — PROGRESS NOTES
TCSW met [facility] with pt and spouse.  TCSW introduced self and discussed role.  Spouse reports that he and pt reside at [facility] in [place].  Spouse states pt utilizes a walker and has aides from [facility] that assist her with bathing.  Spouse reports pt is able to get dressed on her own.  Spouse states that pt is currently active with PT/OT at [facility] 3 times a week.  TCSW discussed with spouse and pt tentative post discharge recommendations.  Spouse states he wants to wait and see what is wrong with pt first before deciding HHC vs SNF if needed.  TC will continue to follow.

## 2023-12-28 NOTE — ED PROVIDER NOTES
HPI   Chief Complaint   Patient presents with    Syncope    Abdominal Pain     Pt to ER via EMS from [facility] after syncopal episode @ facility. Staff told EMS pt was c/o abdominal pain last night then this morning. Pt initially 80/40 for EMS, after 250 mL of NS IV pt 114/62. Pt arrives to ER AOX1, unknown baseline. Pt able to follow commands. Bruise LUE noted, pt on eliquis.        83-year-old female presents emergency department for evaluation for syncopal episode and abdominal pain.  Per staff the patient has been complaining of abdominal pain since last night as per report from EMS.  Unsure of the syncopal episode if it was witnessed.  Unsure if there was head trauma.  Patient has no complaints of headache.  She is complaining of right lower back gluteal pain that has been ongoing.      History provided by:  Patient and EMS personnel    Review of Systems   Gastrointestinal:  Positive for abdominal pain. Negative for constipation, diarrhea, nausea and vomiting.   Genitourinary:  Negative for difficulty urinating and dysuria.   Musculoskeletal:  Positive for back pain.   Neurological:  Positive for syncope. Negative for dizziness, facial asymmetry, light-headedness and headaches.   All other systems reviewed and are negative.        Patient History   No past medical history on file.  Past Surgical History:   Procedure Laterality Date    CT GUIDED IMAGING FOR NEEDLE PLACEMENT  6/1/2018    CT GUIDED IMAGING FOR NEEDLE PLACEMENT [facility]    CT GUIDED IMAGING FOR NEEDLE PLACEMENT  6/17/2019    CT GUIDED IMAGING FOR NEEDLE PLACEMENT [facility]    MR HEAD ANGIO WO IV CONTRAST  10/7/2021    MR HEAD ANGIO WO IV CONTRAST [facility]    MR NECK ANGIO WO IV CONTRAST  10/7/2021    MR NECK ANGIO WO IV CONTRAST [facility]     No family history on file.  Social History     Tobacco Use    Smoking status: Never     Passive exposure: Never    Smokeless tobacco: Never   Substance Use Topics     Alcohol use: Not on file    Drug use: Never       Physical Exam   ED Triage Vitals [12/28/23 1021]   Temp Heart Rate Resp BP   36.7 °C (98 °F) 86 16 157/64      SpO2 Temp src Heart Rate Source Patient Position   96 % -- -- --      BP Location FiO2 (%)     -- --       Physical Exam  Vitals reviewed.   Constitutional:       General: She is not in acute distress.     Appearance: She is well-developed. She is obese. She is not ill-appearing or diaphoretic.   HENT:      Head: Normocephalic and atraumatic.      Mouth/Throat:      Mouth: Mucous membranes are moist.   Cardiovascular:      Rate and Rhythm: Normal rate and regular rhythm.   Abdominal:      General: There is no distension. There are no signs of injury.      Palpations: Abdomen is soft. There is no mass or pulsatile mass.   Musculoskeletal:      Cervical back: Normal range of motion. No tenderness or bony tenderness.      Thoracic back: No signs of trauma or bony tenderness.      Lumbar back: Spasms present. No bony tenderness. Normal range of motion.        Back:    Skin:     General: Skin is warm and dry.   Neurological:      Mental Status: She is alert. Mental status is at baseline.         ED Course & MDM   ED Course as of 12/29/23 2242   Fri Dec 29, 2023   2242 CT head wo IV contrast  FINDINGS:  Ventricular enlargement is observed with proportionate deepening and  widening of the sulci and sylvian fissures. Diminished density within  supratentorial white matter indicates chronic microvascular ischemic  disease.  There is lacunar infarct of the left basal ganglia which is  old. No mass effect, intracranial hemorrhage, or extra-axial fluid  collection is demonstrated.      There is mucous membrane thickening within both maxillary sinuses.      IMPRESSION:  Age-appropriate atrophy and chronic microvascular ischemic disease  with old lacunar infarct of the left basal ganglia.      Bilateral chronic maxillary sinusitis.      Signed by: Ela Ruano 12/28/2023  12:13 PM   [TJ]   2242 CT chest abdomen pelvis w IV contrast  IMPRESSION:  CHEST  1.  Stable lobulated soft tissue density within lateral segment of  the right middle lobe. This was previously biopsied.  2. Cardiomegaly and coronary artery calcification.  3. Old healed anterior right rib fractures.  4. Moderately small hiatal hernia.      ABDOMEN - PELVIS  1.  Dilated pancreatic duct measuring 4 mm with 2 subcentimeter  hypodense lesion suggested within the proximal pancreatic body. If  clinically indicated these could be further evaluated with MRI of the  pancreas with contrast administration.  2. Diverticulosis of the colon.  3. Fibroid uterus without enlargement.  4. Stable 1.2 cm right adrenal mass.  5. Status post vertebroplasty of L5.          MACRO:  None      Signed by: Ela Ruano 12/28/2023 12:52 PM   [TJ]      ED Course User Index  [TJ] Betsy LOZANO MD         Diagnoses as of 12/29/23 2242   Abdominal pain, unspecified abdominal location   Acute right-sided low back pain without sciatica   Syncope and collapse       Medical Decision Making  Syncope   Abdominal pain  There unsure if the patient struck her head.  She is on Eliquis.  CT head without contrast ordered.  CT chest/abdomen/pelvis ordered.    Labs all within normal limits with exception of elevated troponin.  Repeat troponin was unchanged.  Mildly elevated white count.  No evidence of infection a urinalysis but an elevated specific gravity.    Results discussed with the patient and family at bedside.  Lidoderm patch ordered for site of muscle spasms of the lower back.  Outpatient management, follow-up instructions, and discharge instructions provided.    Amount and/or Complexity of Data Reviewed  Labs: ordered. Decision-making details documented in ED Course.  Radiology: ordered. Decision-making details documented in ED Course.  ECG/medicine tests: ordered and independent interpretation performed.     Details: Twelve-lead EKG reviewed and  interpreted by myself  Rate 85, sinus rhythm, left axis deviation, no LVH, intervals within normal limits, no ST segment elevations or depressions, no hyperacute or inverted T waves, no ectopic beats        Procedure  Procedures     Betsy LOZANO MD  12/29/23 0998

## 2023-12-28 NOTE — PROGRESS NOTES
"   12/28/23 1426   Discharge Planning   Living Arrangements Spouse/significant other   Support Systems Spouse/significant other;Family members   Type of Residence Assisted living   Patient expects to be discharged to: TBD         Estela Carnes is a 83 y.o. female on day 0 of admission presenting with No Principal Problem: There is no principal problem currently on the Problem List. Please update the Problem List and refresh..    Subjective          Objective     Physical Exam    Last Recorded Vitals  Blood pressure 156/73, pulse 72, temperature 36.7 °C (98 °F), resp. rate 16, height 1.626 m (5' 4\"), weight 70.4 kg (155 lb 3.3 oz), SpO2 99 %.  Intake/Output last 3 Shifts:  No intake/output data recorded.    Relevant Results                             Assessment/Plan   Active Problems:  There are no active Hospital Problems.               SENAIT White      "

## 2023-12-29 ENCOUNTER — NURSING HOME VISIT (OUTPATIENT)
Dept: POST ACUTE CARE | Facility: EXTERNAL LOCATION | Age: 83
End: 2023-12-29
Payer: MEDICARE

## 2023-12-29 DIAGNOSIS — M15.9 PRIMARY OSTEOARTHRITIS INVOLVING MULTIPLE JOINTS: ICD-10-CM

## 2023-12-29 DIAGNOSIS — R40.1 CLOUDED CONSCIOUSNESS: ICD-10-CM

## 2023-12-29 DIAGNOSIS — I10 ESSENTIAL HYPERTENSION: ICD-10-CM

## 2023-12-29 DIAGNOSIS — R55 SYNCOPE, UNSPECIFIED SYNCOPE TYPE: Primary | ICD-10-CM

## 2023-12-29 LAB — HOLD SPECIMEN: NORMAL

## 2023-12-29 PROCEDURE — 99349 HOME/RES VST EST MOD MDM 40: CPT

## 2023-12-29 ASSESSMENT — ENCOUNTER SYMPTOMS
FACIAL ASYMMETRY: 0
NAUSEA: 0
CONSTIPATION: 0
HEADACHES: 0
DYSURIA: 0
DIFFICULTY URINATING: 0
ABDOMINAL PAIN: 1
BACK PAIN: 1
DIARRHEA: 0
LIGHT-HEADEDNESS: 0
DIZZINESS: 0
VOMITING: 0

## 2023-12-29 ASSESSMENT — PAIN SCALES - GENERAL: PAINLEVEL: 0-NO PAIN

## 2023-12-31 LAB
ATRIAL RATE: 85 BPM
P AXIS: 70 DEGREES
P OFFSET: 176 MS
P ONSET: 143 MS
PR INTERVAL: 146 MS
Q ONSET: 216 MS
QRS COUNT: 13 BEATS
QRS DURATION: 118 MS
QT INTERVAL: 432 MS
QTC CALCULATION(BAZETT): 514 MS
QTC FREDERICIA: 485 MS
R AXIS: -30 DEGREES
T AXIS: 21 DEGREES
T OFFSET: 432 MS
VENTRICULAR RATE: 85 BPM

## 2024-01-01 VITALS — DIASTOLIC BLOOD PRESSURE: 62 MMHG | HEART RATE: 65 BPM | RESPIRATION RATE: 16 BRPM | SYSTOLIC BLOOD PRESSURE: 136 MMHG

## 2024-01-01 ASSESSMENT — ENCOUNTER SYMPTOMS
FREQUENCY: 0
RHINORRHEA: 0
DIZZINESS: 0
CONFUSION: 1
ARTHRALGIAS: 1
MYALGIAS: 1
HEADACHES: 0
CONSTIPATION: 0
VOMITING: 0
FEVER: 0
NAUSEA: 0
LIGHT-HEADEDNESS: 0
CHILLS: 0
SLEEP DISTURBANCE: 0
WEAKNESS: 1
COUGH: 0
DYSURIA: 0
SHORTNESS OF BREATH: 0
FATIGUE: 0

## 2024-01-01 NOTE — PROGRESS NOTES
"Subjective   Patient ID: Estela Carnes is a 83 y.o. female who is assisted living/ home patient being seen at [facility] for a follow up to ER visit for a syncopal episode.     HPI     Pt visited in apartment for a follow up after a visit in the ER for a syncopal episode. Pt with no significant findings after a series of tests were performed and returned back to the assisted living where she resides with spouse. Pt oriented x1, cannot recall year born, or present year. This is baseline for patient. Spouse present for visit. Pt vaguely recalls going to the ER yesterday after \"passing out\". Spouse states pt was sitting at the kitchen table, and then next thing he knew she was slumped over, and not responding. Pt had reported hypotension per nursing at that time, and squad was called. When spouse retelling events, pt states \"What I did? That happened? I don't remember that?\". Today pt denies headache, changes in vision from baseline, dizziness, nausea/vomiting, fever, and chills. Pt denies chest pain, palpitations, and sob at rest and exertion. Pt denies voiding symptoms and constipation. Pt ambulating with walker. Denies any recent falls. Pt denies sleep disturbances. Pt states \"I feel pretty good today\". Spouse states pt ate cereal, coffee, and juice this morning, and appears to be herself today.     Current Outpatient Medications on File Prior to Visit   Medication Sig Dispense Refill    acetaminophen (Tylenol) 325 mg tablet Take 2 tablets (650 mg) by mouth every 4 hours if needed for headaches, fever (temp greater than 38.0 C) or mild pain (1 - 3).      aspirin 81 mg EC tablet Take 1 tablet (81 mg) by mouth 1 time.      atorvastatin (Lipitor) 10 mg tablet Take 1 tablet (10 mg) by mouth once daily.      cholecalciferol (Vitamin D-3) 5,000 Units tablet Take 1 capsule by mouth once daily.      cyanocobalamin (Vitamin B-12) 1,000 mcg tablet Take 1 tablet (1,000 mcg) by mouth once daily.      Eliquis 5 mg " tablet Take by mouth 2 times a day.      guaiFENesin (Mucinex) 600 mg 12 hr tablet Take 1 tablet (600 mg) by mouth 2 times a day as needed for cough or congestion. Do not crush, chew, or split.      guaiFENesin (Robitussin) 100 mg/5 mL syrup Take 10 mL (200 mg) by mouth every 4 hours if needed for cough.      hydrALAZINE (Apresoline) 25 mg tablet Take 1 tablet (25 mg) by mouth 2 times a day.      isosorbide mononitrate ER (Imdur) 30 mg 24 hr tablet Take 1 tablet (30 mg) by mouth once daily.      losartan (Cozaar) 100 mg tablet Take 1 tablet (100 mg) by mouth once daily.      magnesium hydroxide (Milk of Magnesia) 400 mg/5 mL suspension Take 30 mL by mouth as needed at bedtime for constipation.      metFORMIN  mg 24 hr tablet Take 1 tablet (500 mg) by mouth 2 times a day with meals.      PlaqueniL 200 mg tablet Take 1 tablet (200 mg) by mouth 2 times a day.      vit C/E/Zn/coppr/lutein/zeaxan (PRESERVISION AREDS-2 ORAL) Take 1 tablet by mouth once daily.       No current facility-administered medications on file prior to visit.      Review of Systems   Constitutional:  Negative for chills, fatigue and fever.   HENT:  Negative for congestion and rhinorrhea.    Eyes:  Negative for visual disturbance.   Respiratory:  Negative for cough and shortness of breath.    Cardiovascular:  Negative for chest pain and leg swelling.   Gastrointestinal:  Negative for constipation, nausea and vomiting.   Genitourinary:  Negative for dysuria and frequency.   Musculoskeletal:  Positive for arthralgias, gait problem and myalgias.   Neurological:  Positive for weakness. Negative for dizziness, syncope, light-headedness and headaches.   Psychiatric/Behavioral:  Positive for confusion. Negative for sleep disturbance.          Objective   /62 (BP Location: Left arm, Patient Position: Sitting, BP Cuff Size: Adult)   Pulse 65   Resp 16 Comment: 98% oxygen saturation    Physical Exam  Constitutional:       General: She is not in  acute distress.  HENT:      Head: Normocephalic.      Nose: Nose normal. No congestion or rhinorrhea.      Mouth/Throat:      Mouth: Mucous membranes are moist.   Eyes:      Conjunctiva/sclera: Conjunctivae normal.      Pupils: Pupils are equal, round, and reactive to light.   Cardiovascular:      Rate and Rhythm: Normal rate and regular rhythm.      Pulses: Normal pulses.      Heart sounds: Normal heart sounds.   Pulmonary:      Effort: Pulmonary effort is normal. No respiratory distress.      Breath sounds: Normal breath sounds. No wheezing or rhonchi.   Abdominal:      General: Bowel sounds are normal.   Musculoskeletal:         General: No swelling. Normal range of motion.      Cervical back: Normal range of motion.      Right lower leg: No edema.      Left lower leg: No edema.   Skin:     General: Skin is warm.      Capillary Refill: Capillary refill takes less than 2 seconds.   Neurological:      Mental Status: She is alert. Mental status is at baseline.      Comments: Oriented x1, appears to be at baseline today.    Psychiatric:         Attention and Perception: Attention normal.         Mood and Affect: Mood normal.         Speech: Speech normal.         Cognition and Memory: Cognition is impaired. Memory is impaired.         Assessment/Plan   Diagnoses and all orders for this visit:  Syncope, unspecified syncope type  Comments:  Asymptomatic  Essential hypertension  Comments:  Blood pressure controlled.  Primary osteoarthritis involving multiple joints  Comments:  Pain controlled.  Clouded consciousness  Comments:  Pt is at baseline.

## 2024-01-07 PROBLEM — S92.309A CLOSED FRACTURE OF METATARSAL BONE: Status: RESOLVED | Noted: 2024-01-07 | Resolved: 2024-01-07

## 2024-01-07 PROBLEM — N93.9 VAGINAL SPOTTING: Status: ACTIVE | Noted: 2024-01-07

## 2024-01-07 PROBLEM — J32.9 SINUSITIS: Status: ACTIVE | Noted: 2024-01-07

## 2024-01-07 PROBLEM — R91.1 LUNG NODULE: Status: ACTIVE | Noted: 2024-01-07

## 2024-01-07 PROBLEM — G47.30 SLEEP APNEA: Status: ACTIVE | Noted: 2024-01-07

## 2024-01-07 PROBLEM — N95.0 POSTMENOPAUSAL BLEEDING: Status: ACTIVE | Noted: 2024-01-07

## 2024-01-07 PROBLEM — M17.11 PRIMARY OSTEOARTHRITIS OF RIGHT KNEE: Status: ACTIVE | Noted: 2024-01-07

## 2024-01-07 PROBLEM — E78.5 HYPERLIPIDEMIA: Status: ACTIVE | Noted: 2024-01-07

## 2024-03-19 ENCOUNTER — OFFICE VISIT (OUTPATIENT)
Dept: ORTHOPEDIC SURGERY | Facility: CLINIC | Age: 84
End: 2024-03-19
Payer: MEDICARE

## 2024-03-19 ENCOUNTER — HOSPITAL ENCOUNTER (OUTPATIENT)
Dept: RADIOLOGY | Facility: CLINIC | Age: 84
Discharge: HOME | End: 2024-03-19
Payer: MEDICARE

## 2024-03-19 DIAGNOSIS — M25.562 ACUTE PAIN OF LEFT KNEE: ICD-10-CM

## 2024-03-19 DIAGNOSIS — Z96.652 HISTORY OF LEFT KNEE REPLACEMENT: Primary | ICD-10-CM

## 2024-03-19 PROCEDURE — 99203 OFFICE O/P NEW LOW 30 MIN: CPT | Performed by: STUDENT IN AN ORGANIZED HEALTH CARE EDUCATION/TRAINING PROGRAM

## 2024-03-19 PROCEDURE — 1159F MED LIST DOCD IN RCRD: CPT | Performed by: STUDENT IN AN ORGANIZED HEALTH CARE EDUCATION/TRAINING PROGRAM

## 2024-03-19 PROCEDURE — 1036F TOBACCO NON-USER: CPT | Performed by: STUDENT IN AN ORGANIZED HEALTH CARE EDUCATION/TRAINING PROGRAM

## 2024-03-19 PROCEDURE — 73564 X-RAY EXAM KNEE 4 OR MORE: CPT | Mod: LT

## 2024-03-19 PROCEDURE — 73564 X-RAY EXAM KNEE 4 OR MORE: CPT | Mod: LEFT SIDE | Performed by: RADIOLOGY

## 2024-03-19 PROCEDURE — 1157F ADVNC CARE PLAN IN RCRD: CPT | Performed by: STUDENT IN AN ORGANIZED HEALTH CARE EDUCATION/TRAINING PROGRAM

## 2024-03-19 NOTE — PROGRESS NOTES
Estela Carnes  is a 83 y.o. year-old  female. she  is a new patient to our office and presents with a chief complaint of Left  knee pain.  She is here with her .  They note a history of a previous knee replacement they are unsure with whom and when.  Patient is somewhat of a poor historian and is really not clear on why she is here today although her  states she has been having some pain in the knee.  There is been no new injury.  She does have some difficulty walking at home and utilizes a walker.      Past Medical, Family, and Social History reviewed   Review of Systems  A complete review of systems was conducted, pertinent only to the HPI noted above.  Constitutional: None  Eyes: No additions to above history  Ears, Nose, Throat: No additions to above history  Cardiovascular: No additions to above history  Respiratory: No additions to above history  GI: No additions to above history  : No additions to above history  Skin/Neuro: No additions to above history  Endocrine/Heme/Lymph: No additions to above history  Immunologic: No additions to above history  Psychiatric: No additions to above history  Musculoskeletal: see above    GEN: Alert and Oriented x 3  Constitutional: Well appearing , in no apparent distress.  Skin: No rashes, erythema, or induration around knee healed midline incision with no concern of infection    MUSCULOSKELETAL EXAM:     Left KNEE:  ROM: 0/0/130  Effusion: negative  Alignment: [neutral]      Gait: [normal]  Sensation intact bilaterally sural/saphenous/sp/dp/tibial nerve bilaterally  Motor 5/5 knee flexion/extension/foot DF/PF/EHL/FHL bilaterally  Palpable/symmetric DP and PT pulse bilaterally      PATELLAR/EXTENSOR MECHANISM:   KNEE:  Patellar Crepitus: n  Patellar Compression Pain:n  Patellar Apprehension: [no]  Extensor Mechanism: [intact]  Straight Leg Raise: good      MENISCUS EXAM:  Joint Line Tenderness: No discrete tenderness  McMurrays: [negative]  Pain with Deep  Flexion: [No]    Xrays independently viewed and interpreted: Total knee replacement without evidence of loosening or failure    I reviewed with the patient that I do not routinely do total knee replacements but did review their x-rays which does not demonstrate any issues that I can see.  I have given her prescription for therapy to help with gait training.  We have also given them the name of one of our joint replacement partners for follow-up on her knee replacement if she continues to have issue.  All questions were answered they are in agreement with this plan

## 2024-03-25 ENCOUNTER — TELEPHONE (OUTPATIENT)
Dept: PRIMARY CARE | Facility: CLINIC | Age: 84
End: 2024-03-25
Payer: MEDICARE

## 2024-03-25 DIAGNOSIS — K05.00 ACUTE GINGIVITIS: Primary | ICD-10-CM

## 2024-03-25 RX ORDER — CHLORHEXIDINE GLUCONATE ORAL RINSE 1.2 MG/ML
15 SOLUTION DENTAL EVERY 12 HOURS
Qty: 420 ML | Refills: 0 | Status: SHIPPED | OUTPATIENT
Start: 2024-03-25 | End: 2024-04-05 | Stop reason: HOSPADM

## 2024-03-25 NOTE — TELEPHONE ENCOUNTER
Spoke to [facility], pt with bleeding gums, gingivitis. Poor hygiene. No other symptoms. Encouraged brushing 2-3x daily with soft toothbrush. Pt unable to floss. Will prescribe chlorhexidine 0.12% po oral rinse.

## 2024-03-25 NOTE — TELEPHONE ENCOUNTER
Patient noticed to have dried blood around her mouth when nurse went to give medications this morning.  Patient does take Eliquis twice daily.  Last visit to dentist was two weeks ago.  Nurse Latosha did call dentist to inquire what procedure was done, patient only had a teeth cleaning, dentist said to reach out to Primary Provider.  Additionally Latosha reports she saw this last week also but was told patient went to dentist.  Please Advise.

## 2024-03-28 ENCOUNTER — APPOINTMENT (OUTPATIENT)
Dept: RADIOLOGY | Facility: HOSPITAL | Age: 84
DRG: 204 | End: 2024-03-28
Payer: MEDICARE

## 2024-03-28 ENCOUNTER — HOSPITAL ENCOUNTER (INPATIENT)
Facility: HOSPITAL | Age: 84
LOS: 1 days | Discharge: HOME | DRG: 204 | End: 2024-03-29
Attending: STUDENT IN AN ORGANIZED HEALTH CARE EDUCATION/TRAINING PROGRAM | Admitting: INTERNAL MEDICINE
Payer: MEDICARE

## 2024-03-28 DIAGNOSIS — R05.1 ACUTE COUGH: ICD-10-CM

## 2024-03-28 DIAGNOSIS — T17.908A ASPIRATION INTO AIRWAY, INITIAL ENCOUNTER: ICD-10-CM

## 2024-03-28 DIAGNOSIS — J69.0: Primary | ICD-10-CM

## 2024-03-28 PROCEDURE — 71046 X-RAY EXAM CHEST 2 VIEWS: CPT

## 2024-03-28 PROCEDURE — 99285 EMERGENCY DEPT VISIT HI MDM: CPT | Mod: 25

## 2024-03-28 PROCEDURE — 71250 CT THORAX DX C-: CPT

## 2024-03-28 PROCEDURE — 71250 CT THORAX DX C-: CPT | Performed by: RADIOLOGY

## 2024-03-28 PROCEDURE — 71046 X-RAY EXAM CHEST 2 VIEWS: CPT | Performed by: RADIOLOGY

## 2024-03-28 ASSESSMENT — COLUMBIA-SUICIDE SEVERITY RATING SCALE - C-SSRS
6. HAVE YOU EVER DONE ANYTHING, STARTED TO DO ANYTHING, OR PREPARED TO DO ANYTHING TO END YOUR LIFE?: NO
1. IN THE PAST MONTH, HAVE YOU WISHED YOU WERE DEAD OR WISHED YOU COULD GO TO SLEEP AND NOT WAKE UP?: NO
2. HAVE YOU ACTUALLY HAD ANY THOUGHTS OF KILLING YOURSELF?: NO

## 2024-03-28 ASSESSMENT — PAIN - FUNCTIONAL ASSESSMENT: PAIN_FUNCTIONAL_ASSESSMENT: 0-10

## 2024-03-28 ASSESSMENT — PAIN SCALES - GENERAL: PAINLEVEL_OUTOF10: 0 - NO PAIN

## 2024-03-29 ENCOUNTER — APPOINTMENT (OUTPATIENT)
Dept: RADIOLOGY | Facility: HOSPITAL | Age: 84
DRG: 204 | End: 2024-03-29
Payer: MEDICARE

## 2024-03-29 ENCOUNTER — ANESTHESIA (OUTPATIENT)
Dept: GASTROENTEROLOGY | Facility: HOSPITAL | Age: 84
DRG: 204 | End: 2024-03-29
Payer: MEDICARE

## 2024-03-29 ENCOUNTER — ANESTHESIA EVENT (OUTPATIENT)
Dept: GASTROENTEROLOGY | Facility: HOSPITAL | Age: 84
DRG: 204 | End: 2024-03-29
Payer: MEDICARE

## 2024-03-29 ENCOUNTER — APPOINTMENT (OUTPATIENT)
Dept: CARDIOLOGY | Facility: HOSPITAL | Age: 84
DRG: 204 | End: 2024-03-29
Payer: MEDICARE

## 2024-03-29 ENCOUNTER — APPOINTMENT (OUTPATIENT)
Dept: GASTROENTEROLOGY | Facility: HOSPITAL | Age: 84
DRG: 204 | End: 2024-03-29
Payer: MEDICARE

## 2024-03-29 VITALS
TEMPERATURE: 98.1 F | SYSTOLIC BLOOD PRESSURE: 155 MMHG | DIASTOLIC BLOOD PRESSURE: 86 MMHG | HEIGHT: 61 IN | BODY MASS INDEX: 25.64 KG/M2 | HEART RATE: 98 BPM | OXYGEN SATURATION: 93 % | RESPIRATION RATE: 22 BRPM | WEIGHT: 135.8 LBS

## 2024-03-29 PROBLEM — R05.1 ACUTE COUGH: Status: ACTIVE | Noted: 2024-03-29

## 2024-03-29 PROBLEM — J69.0: Status: ACTIVE | Noted: 2024-03-29

## 2024-03-29 PROBLEM — T17.908A ASPIRATION INTO AIRWAY, INITIAL ENCOUNTER: Status: ACTIVE | Noted: 2024-03-29

## 2024-03-29 LAB
ALBUMIN SERPL-MCNC: 3.7 G/DL (ref 3.5–5)
ALP BLD-CCNC: 91 U/L (ref 35–125)
ALT SERPL-CCNC: 22 U/L (ref 5–40)
ANION GAP SERPL CALC-SCNC: 10 MMOL/L
ANION GAP SERPL CALC-SCNC: 12 MMOL/L
APPEARANCE UR: CLEAR
AST SERPL-CCNC: 35 U/L (ref 5–40)
BACTERIA #/AREA URNS AUTO: ABNORMAL /HPF
BASOPHILS # BLD AUTO: 0.09 X10*3/UL (ref 0–0.1)
BASOPHILS NFR BLD AUTO: 1.1 %
BILIRUB SERPL-MCNC: 0.3 MG/DL (ref 0.1–1.2)
BILIRUB UR STRIP.AUTO-MCNC: NEGATIVE MG/DL
BUN SERPL-MCNC: 12 MG/DL (ref 8–25)
BUN SERPL-MCNC: 13 MG/DL (ref 8–25)
CALCIUM SERPL-MCNC: 9.2 MG/DL (ref 8.5–10.4)
CALCIUM SERPL-MCNC: 9.3 MG/DL (ref 8.5–10.4)
CHLORIDE SERPL-SCNC: 105 MMOL/L (ref 97–107)
CHLORIDE SERPL-SCNC: 105 MMOL/L (ref 97–107)
CO2 SERPL-SCNC: 24 MMOL/L (ref 24–31)
CO2 SERPL-SCNC: 26 MMOL/L (ref 24–31)
COLOR UR: ABNORMAL
CREAT SERPL-MCNC: 0.5 MG/DL (ref 0.4–1.6)
CREAT SERPL-MCNC: 0.5 MG/DL (ref 0.4–1.6)
EGFRCR SERPLBLD CKD-EPI 2021: >90 ML/MIN/1.73M*2
EGFRCR SERPLBLD CKD-EPI 2021: >90 ML/MIN/1.73M*2
EOSINOPHIL # BLD AUTO: 0.38 X10*3/UL (ref 0–0.4)
EOSINOPHIL NFR BLD AUTO: 4.6 %
ERYTHROCYTE [DISTWIDTH] IN BLOOD BY AUTOMATED COUNT: 12.8 % (ref 11.5–14.5)
ERYTHROCYTE [DISTWIDTH] IN BLOOD BY AUTOMATED COUNT: 12.9 % (ref 11.5–14.5)
EST. AVERAGE GLUCOSE BLD GHB EST-MCNC: 160 MG/DL
GLUCOSE BLD MANUAL STRIP-MCNC: 114 MG/DL (ref 74–99)
GLUCOSE BLD MANUAL STRIP-MCNC: 118 MG/DL (ref 74–99)
GLUCOSE BLD MANUAL STRIP-MCNC: 130 MG/DL (ref 74–99)
GLUCOSE BLD MANUAL STRIP-MCNC: 130 MG/DL (ref 74–99)
GLUCOSE BLD MANUAL STRIP-MCNC: 136 MG/DL (ref 74–99)
GLUCOSE SERPL-MCNC: 131 MG/DL (ref 65–99)
GLUCOSE SERPL-MCNC: 133 MG/DL (ref 65–99)
GLUCOSE UR STRIP.AUTO-MCNC: NORMAL MG/DL
HBA1C MFR BLD: 7.2 %
HCT VFR BLD AUTO: 33.3 % (ref 36–46)
HCT VFR BLD AUTO: 34.5 % (ref 36–46)
HGB BLD-MCNC: 10.8 G/DL (ref 12–16)
HGB BLD-MCNC: 11 G/DL (ref 12–16)
IMM GRANULOCYTES # BLD AUTO: 0.02 X10*3/UL (ref 0–0.5)
IMM GRANULOCYTES NFR BLD AUTO: 0.2 % (ref 0–0.9)
KETONES UR STRIP.AUTO-MCNC: NEGATIVE MG/DL
LEUKOCYTE ESTERASE UR QL STRIP.AUTO: ABNORMAL
LYMPHOCYTES # BLD AUTO: 1.42 X10*3/UL (ref 0.8–3)
LYMPHOCYTES NFR BLD AUTO: 17.1 %
MAGNESIUM SERPL-MCNC: 1.6 MG/DL (ref 1.6–3.1)
MCH RBC QN AUTO: 26.3 PG (ref 26–34)
MCH RBC QN AUTO: 26.7 PG (ref 26–34)
MCHC RBC AUTO-ENTMCNC: 31.9 G/DL (ref 32–36)
MCHC RBC AUTO-ENTMCNC: 32.4 G/DL (ref 32–36)
MCV RBC AUTO: 82 FL (ref 80–100)
MCV RBC AUTO: 83 FL (ref 80–100)
MONOCYTES # BLD AUTO: 0.88 X10*3/UL (ref 0.05–0.8)
MONOCYTES NFR BLD AUTO: 10.6 %
NEUTROPHILS # BLD AUTO: 5.49 X10*3/UL (ref 1.6–5.5)
NEUTROPHILS NFR BLD AUTO: 66.4 %
NITRITE UR QL STRIP.AUTO: NEGATIVE
NRBC BLD-RTO: 0 /100 WBCS (ref 0–0)
NRBC BLD-RTO: 0 /100 WBCS (ref 0–0)
NT-PROBNP SERPL-MCNC: 294 PG/ML (ref 0–624)
PH UR STRIP.AUTO: 6 [PH]
PLATELET # BLD AUTO: 273 X10*3/UL (ref 150–450)
PLATELET # BLD AUTO: 297 X10*3/UL (ref 150–450)
POTASSIUM SERPL-SCNC: 3.8 MMOL/L (ref 3.4–5.1)
POTASSIUM SERPL-SCNC: 4.2 MMOL/L (ref 3.4–5.1)
PROT SERPL-MCNC: 6.4 G/DL (ref 5.9–7.9)
PROT UR STRIP.AUTO-MCNC: NEGATIVE MG/DL
RBC # BLD AUTO: 4.05 X10*6/UL (ref 4–5.2)
RBC # BLD AUTO: 4.18 X10*6/UL (ref 4–5.2)
RBC # UR STRIP.AUTO: NEGATIVE /UL
RBC #/AREA URNS AUTO: ABNORMAL /HPF
SODIUM SERPL-SCNC: 141 MMOL/L (ref 133–145)
SODIUM SERPL-SCNC: 141 MMOL/L (ref 133–145)
SP GR UR STRIP.AUTO: 1.01
TROPONIN T SERPL-MCNC: 49 NG/L
TROPONIN T SERPL-MCNC: 57 NG/L
TROPONIN T SERPL-MCNC: 58 NG/L
UROBILINOGEN UR STRIP.AUTO-MCNC: NORMAL MG/DL
WBC # BLD AUTO: 8.3 X10*3/UL (ref 4.4–11.3)
WBC # BLD AUTO: 8.8 X10*3/UL (ref 4.4–11.3)
WBC #/AREA URNS AUTO: ABNORMAL /HPF

## 2024-03-29 PROCEDURE — 93010 ELECTROCARDIOGRAM REPORT: CPT | Performed by: INTERNAL MEDICINE

## 2024-03-29 PROCEDURE — 85027 COMPLETE CBC AUTOMATED: CPT | Performed by: INTERNAL MEDICINE

## 2024-03-29 PROCEDURE — 82947 ASSAY GLUCOSE BLOOD QUANT: CPT

## 2024-03-29 PROCEDURE — 81001 URINALYSIS AUTO W/SCOPE: CPT | Performed by: INTERNAL MEDICINE

## 2024-03-29 PROCEDURE — 70450 CT HEAD/BRAIN W/O DYE: CPT | Performed by: RADIOLOGY

## 2024-03-29 PROCEDURE — 74230 X-RAY XM SWLNG FUNCJ C+: CPT

## 2024-03-29 PROCEDURE — 7100000001 HC RECOVERY ROOM TIME - INITIAL BASE CHARGE: Performed by: INTERNAL MEDICINE

## 2024-03-29 PROCEDURE — 83880 ASSAY OF NATRIURETIC PEPTIDE: CPT | Performed by: INTERNAL MEDICINE

## 2024-03-29 PROCEDURE — 2500000001 HC RX 250 WO HCPCS SELF ADMINISTERED DRUGS (ALT 637 FOR MEDICARE OP): Performed by: INTERNAL MEDICINE

## 2024-03-29 PROCEDURE — 97161 PT EVAL LOW COMPLEX 20 MIN: CPT | Mod: GP

## 2024-03-29 PROCEDURE — 3700000001 HC GENERAL ANESTHESIA TIME - INITIAL BASE CHARGE: Performed by: INTERNAL MEDICINE

## 2024-03-29 PROCEDURE — 84484 ASSAY OF TROPONIN QUANT: CPT | Performed by: INTERNAL MEDICINE

## 2024-03-29 PROCEDURE — 93005 ELECTROCARDIOGRAM TRACING: CPT

## 2024-03-29 PROCEDURE — 36415 COLL VENOUS BLD VENIPUNCTURE: CPT | Performed by: INTERNAL MEDICINE

## 2024-03-29 PROCEDURE — 2500000004 HC RX 250 GENERAL PHARMACY W/ HCPCS (ALT 636 FOR OP/ED): Performed by: ANESTHESIOLOGY

## 2024-03-29 PROCEDURE — 80048 BASIC METABOLIC PNL TOTAL CA: CPT | Performed by: STUDENT IN AN ORGANIZED HEALTH CARE EDUCATION/TRAINING PROGRAM

## 2024-03-29 PROCEDURE — 1200000002 HC GENERAL ROOM WITH TELEMETRY DAILY

## 2024-03-29 PROCEDURE — 36415 COLL VENOUS BLD VENIPUNCTURE: CPT | Performed by: STUDENT IN AN ORGANIZED HEALTH CARE EDUCATION/TRAINING PROGRAM

## 2024-03-29 PROCEDURE — C9113 INJ PANTOPRAZOLE SODIUM, VIA: HCPCS | Performed by: INTERNAL MEDICINE

## 2024-03-29 PROCEDURE — 92610 EVALUATE SWALLOWING FUNCTION: CPT | Mod: GN | Performed by: SPEECH-LANGUAGE PATHOLOGIST

## 2024-03-29 PROCEDURE — 70450 CT HEAD/BRAIN W/O DYE: CPT

## 2024-03-29 PROCEDURE — 2500000005 HC RX 250 GENERAL PHARMACY W/O HCPCS: Performed by: INTERNAL MEDICINE

## 2024-03-29 PROCEDURE — 2500000004 HC RX 250 GENERAL PHARMACY W/ HCPCS (ALT 636 FOR OP/ED): Performed by: INTERNAL MEDICINE

## 2024-03-29 PROCEDURE — 87040 BLOOD CULTURE FOR BACTERIA: CPT | Mod: TRILAB | Performed by: INTERNAL MEDICINE

## 2024-03-29 PROCEDURE — 3430000001 HC RX 343 DIAGNOSTIC RADIOPHARMACEUTICALS: Performed by: INTERNAL MEDICINE

## 2024-03-29 PROCEDURE — 83036 HEMOGLOBIN GLYCOSYLATED A1C: CPT | Performed by: INTERNAL MEDICINE

## 2024-03-29 PROCEDURE — 0BJ08ZZ INSPECTION OF TRACHEOBRONCHIAL TREE, VIA NATURAL OR ARTIFICIAL OPENING ENDOSCOPIC: ICD-10-PCS | Performed by: INTERNAL MEDICINE

## 2024-03-29 PROCEDURE — 83735 ASSAY OF MAGNESIUM: CPT | Performed by: INTERNAL MEDICINE

## 2024-03-29 PROCEDURE — 97165 OT EVAL LOW COMPLEX 30 MIN: CPT | Mod: GO

## 2024-03-29 PROCEDURE — 92611 MOTION FLUOROSCOPY/SWALLOW: CPT | Mod: GN | Performed by: SPEECH-LANGUAGE PATHOLOGIST

## 2024-03-29 PROCEDURE — 85025 COMPLETE CBC W/AUTO DIFF WBC: CPT | Performed by: STUDENT IN AN ORGANIZED HEALTH CARE EDUCATION/TRAINING PROGRAM

## 2024-03-29 PROCEDURE — 7100000002 HC RECOVERY ROOM TIME - EACH INCREMENTAL 1 MINUTE: Performed by: INTERNAL MEDICINE

## 2024-03-29 PROCEDURE — 81003 URINALYSIS AUTO W/O SCOPE: CPT | Performed by: INTERNAL MEDICINE

## 2024-03-29 PROCEDURE — 2500000001 HC RX 250 WO HCPCS SELF ADMINISTERED DRUGS (ALT 637 FOR MEDICARE OP): Performed by: STUDENT IN AN ORGANIZED HEALTH CARE EDUCATION/TRAINING PROGRAM

## 2024-03-29 PROCEDURE — 74230 X-RAY XM SWLNG FUNCJ C+: CPT | Performed by: RADIOLOGY

## 2024-03-29 PROCEDURE — 3700000002 HC GENERAL ANESTHESIA TIME - EACH INCREMENTAL 1 MINUTE: Performed by: INTERNAL MEDICINE

## 2024-03-29 RX ORDER — DEXTROSE 50 % IN WATER (D50W) INTRAVENOUS SYRINGE
25
Status: DISCONTINUED | OUTPATIENT
Start: 2024-03-29 | End: 2024-03-29 | Stop reason: HOSPADM

## 2024-03-29 RX ORDER — DEXTROSE 50 % IN WATER (D50W) INTRAVENOUS SYRINGE
12.5
Status: DISCONTINUED | OUTPATIENT
Start: 2024-03-29 | End: 2024-03-29 | Stop reason: HOSPADM

## 2024-03-29 RX ORDER — FENTANYL CITRATE 50 UG/ML
50 INJECTION, SOLUTION INTRAMUSCULAR; INTRAVENOUS EVERY 5 MIN PRN
Status: DISCONTINUED | OUTPATIENT
Start: 2024-03-29 | End: 2024-03-29 | Stop reason: HOSPADM

## 2024-03-29 RX ORDER — LIDOCAINE HYDROCHLORIDE 20 MG/ML
INJECTION, SOLUTION EPIDURAL; INFILTRATION; INTRACAUDAL; PERINEURAL AS NEEDED
Status: COMPLETED | OUTPATIENT
Start: 2024-03-29 | End: 2024-03-29

## 2024-03-29 RX ORDER — ONDANSETRON HYDROCHLORIDE 2 MG/ML
4 INJECTION, SOLUTION INTRAVENOUS ONCE AS NEEDED
Status: DISCONTINUED | OUTPATIENT
Start: 2024-03-29 | End: 2024-03-29 | Stop reason: HOSPADM

## 2024-03-29 RX ORDER — PROPOFOL 10 MG/ML
INJECTION, EMULSION INTRAVENOUS AS NEEDED
Status: DISCONTINUED | OUTPATIENT
Start: 2024-03-29 | End: 2024-03-29

## 2024-03-29 RX ORDER — SODIUM CHLORIDE 450 MG/100ML
75 INJECTION, SOLUTION INTRAVENOUS CONTINUOUS
Status: DISCONTINUED | OUTPATIENT
Start: 2024-03-29 | End: 2024-03-29

## 2024-03-29 RX ORDER — HYDRALAZINE HYDROCHLORIDE 25 MG/1
25 TABLET, FILM COATED ORAL ONCE
Status: COMPLETED | OUTPATIENT
Start: 2024-03-29 | End: 2024-03-29

## 2024-03-29 RX ORDER — HEPARIN SODIUM 5000 [USP'U]/ML
5000 INJECTION, SOLUTION INTRAVENOUS; SUBCUTANEOUS EVERY 8 HOURS
Status: DISCONTINUED | OUTPATIENT
Start: 2024-03-29 | End: 2024-03-29 | Stop reason: HOSPADM

## 2024-03-29 RX ORDER — PANTOPRAZOLE SODIUM 40 MG/10ML
40 INJECTION, POWDER, LYOPHILIZED, FOR SOLUTION INTRAVENOUS
Status: DISCONTINUED | OUTPATIENT
Start: 2024-03-29 | End: 2024-03-29 | Stop reason: HOSPADM

## 2024-03-29 RX ORDER — MIDAZOLAM HYDROCHLORIDE 1 MG/ML
1 INJECTION, SOLUTION INTRAMUSCULAR; INTRAVENOUS ONCE AS NEEDED
Status: DISCONTINUED | OUTPATIENT
Start: 2024-03-29 | End: 2024-03-29 | Stop reason: HOSPADM

## 2024-03-29 RX ORDER — HYDRALAZINE HYDROCHLORIDE 20 MG/ML
5 INJECTION INTRAMUSCULAR; INTRAVENOUS EVERY 30 MIN PRN
Status: DISCONTINUED | OUTPATIENT
Start: 2024-03-29 | End: 2024-03-29 | Stop reason: HOSPADM

## 2024-03-29 RX ORDER — HYDRALAZINE HYDROCHLORIDE 20 MG/ML
5 INJECTION INTRAMUSCULAR; INTRAVENOUS EVERY 4 HOURS PRN
Status: DISCONTINUED | OUTPATIENT
Start: 2024-03-29 | End: 2024-03-29 | Stop reason: HOSPADM

## 2024-03-29 RX ORDER — INSULIN LISPRO 100 [IU]/ML
0-10 INJECTION, SOLUTION INTRAVENOUS; SUBCUTANEOUS EVERY 4 HOURS
Status: DISCONTINUED | OUTPATIENT
Start: 2024-03-29 | End: 2024-03-29 | Stop reason: HOSPADM

## 2024-03-29 RX ORDER — LIDOCAINE HYDROCHLORIDE 40 MG/ML
SOLUTION TOPICAL AS NEEDED
Status: COMPLETED | OUTPATIENT
Start: 2024-03-29 | End: 2024-03-29

## 2024-03-29 RX ORDER — ALBUTEROL SULFATE 0.83 MG/ML
2.5 SOLUTION RESPIRATORY (INHALATION) ONCE AS NEEDED
Status: DISCONTINUED | OUTPATIENT
Start: 2024-03-29 | End: 2024-03-29 | Stop reason: HOSPADM

## 2024-03-29 RX ORDER — SODIUM CHLORIDE, SODIUM LACTATE, POTASSIUM CHLORIDE, CALCIUM CHLORIDE 600; 310; 30; 20 MG/100ML; MG/100ML; MG/100ML; MG/100ML
100 INJECTION, SOLUTION INTRAVENOUS CONTINUOUS
Status: DISCONTINUED | OUTPATIENT
Start: 2024-03-29 | End: 2024-03-29 | Stop reason: HOSPADM

## 2024-03-29 RX ORDER — ACETAMINOPHEN 650 MG/1
650 SUPPOSITORY RECTAL EVERY 4 HOURS PRN
Status: DISCONTINUED | OUTPATIENT
Start: 2024-03-29 | End: 2024-03-29

## 2024-03-29 RX ORDER — LIDOCAINE HYDROCHLORIDE 10 MG/ML
0.1 INJECTION INFILTRATION; PERINEURAL ONCE
Status: DISCONTINUED | OUTPATIENT
Start: 2024-03-29 | End: 2024-03-29 | Stop reason: HOSPADM

## 2024-03-29 RX ADMIN — LIDOCAINE HYDROCHLORIDE 1 APPLICATION: 40 SOLUTION TOPICAL at 11:49

## 2024-03-29 RX ADMIN — PIPERACILLIN SODIUM AND TAZOBACTAM SODIUM 3.38 G: 3; .375 INJECTION, SOLUTION INTRAVENOUS at 04:19

## 2024-03-29 RX ADMIN — PANTOPRAZOLE SODIUM 40 MG: 40 INJECTION, POWDER, FOR SOLUTION INTRAVENOUS at 06:28

## 2024-03-29 RX ADMIN — BARIUM SULFATE 10 ML: 400 SUSPENSION ORAL at 10:30

## 2024-03-29 RX ADMIN — HYDRALAZINE HYDROCHLORIDE 5 MG: 20 INJECTION INTRAMUSCULAR; INTRAVENOUS at 08:16

## 2024-03-29 RX ADMIN — LIDOCAINE HYDROCHLORIDE 10 ML: 20 INJECTION, SOLUTION EPIDURAL; INFILTRATION; INTRACAUDAL; PERINEURAL at 11:57

## 2024-03-29 RX ADMIN — PIPERACILLIN SODIUM AND TAZOBACTAM SODIUM 3.38 G: 3; .375 INJECTION, SOLUTION INTRAVENOUS at 09:26

## 2024-03-29 RX ADMIN — HYDRALAZINE HYDROCHLORIDE 25 MG: 25 TABLET, FILM COATED ORAL at 00:02

## 2024-03-29 RX ADMIN — BARIUM SULFATE 15 ML: 0.81 POWDER, FOR SUSPENSION ORAL at 10:29

## 2024-03-29 RX ADMIN — PROPOFOL 50 MG: 10 INJECTION, EMULSION INTRAVENOUS at 11:55

## 2024-03-29 RX ADMIN — PROPOFOL 50 MG: 10 INJECTION, EMULSION INTRAVENOUS at 11:52

## 2024-03-29 RX ADMIN — BARIUM SULFATE 10 ML: 400 SUSPENSION ORAL at 10:29

## 2024-03-29 RX ADMIN — BARIUM SULFATE 10 ML: 400 PASTE ORAL at 10:30

## 2024-03-29 RX ADMIN — SODIUM CHLORIDE 75 ML/HR: 450 INJECTION, SOLUTION INTRAVENOUS at 03:53

## 2024-03-29 SDOH — SOCIAL STABILITY: SOCIAL INSECURITY: DOES ANYONE TRY TO KEEP YOU FROM HAVING/CONTACTING OTHER FRIENDS OR DOING THINGS OUTSIDE YOUR HOME?: UNABLE TO ASSESS

## 2024-03-29 SDOH — SOCIAL STABILITY: SOCIAL INSECURITY: WERE YOU ABLE TO COMPLETE ALL THE BEHAVIORAL HEALTH SCREENINGS?: NO

## 2024-03-29 SDOH — SOCIAL STABILITY: SOCIAL INSECURITY: DO YOU FEEL UNSAFE GOING BACK TO THE PLACE WHERE YOU ARE LIVING?: UNABLE TO ASSESS

## 2024-03-29 SDOH — SOCIAL STABILITY: SOCIAL INSECURITY: HAS ANYONE EVER THREATENED TO HURT YOUR FAMILY OR YOUR PETS?: UNABLE TO ASSESS

## 2024-03-29 SDOH — SOCIAL STABILITY: SOCIAL INSECURITY: DO YOU FEEL ANYONE HAS EXPLOITED OR TAKEN ADVANTAGE OF YOU FINANCIALLY OR OF YOUR PERSONAL PROPERTY?: UNABLE TO ASSESS

## 2024-03-29 SDOH — SOCIAL STABILITY: SOCIAL INSECURITY: ABUSE: ADULT

## 2024-03-29 SDOH — SOCIAL STABILITY: SOCIAL INSECURITY: ARE YOU OR HAVE YOU BEEN THREATENED OR ABUSED PHYSICALLY, EMOTIONALLY, OR SEXUALLY BY ANYONE?: UNABLE TO ASSESS

## 2024-03-29 SDOH — HEALTH STABILITY: MENTAL HEALTH: CURRENT SMOKER: 0

## 2024-03-29 SDOH — SOCIAL STABILITY: SOCIAL INSECURITY: ARE THERE ANY APPARENT SIGNS OF INJURIES/BEHAVIORS THAT COULD BE RELATED TO ABUSE/NEGLECT?: UNABLE TO ASSESS

## 2024-03-29 SDOH — SOCIAL STABILITY: SOCIAL INSECURITY: HAVE YOU HAD THOUGHTS OF HARMING ANYONE ELSE?: NO

## 2024-03-29 ASSESSMENT — PAIN SCALES - GENERAL
PAIN_LEVEL: 2
PAINLEVEL_OUTOF10: 0 - NO PAIN

## 2024-03-29 ASSESSMENT — PATIENT HEALTH QUESTIONNAIRE - PHQ9
SUM OF ALL RESPONSES TO PHQ9 QUESTIONS 1 & 2: 0
1. LITTLE INTEREST OR PLEASURE IN DOING THINGS: NOT AT ALL
2. FEELING DOWN, DEPRESSED OR HOPELESS: NOT AT ALL

## 2024-03-29 ASSESSMENT — ENCOUNTER SYMPTOMS
FACIAL ASYMMETRY: 0
SEIZURES: 0
TREMORS: 0
LIGHT-HEADEDNESS: 0
DIZZINESS: 0
NUMBNESS: 0
HEADACHES: 0
CONFUSION: 1
SPEECH DIFFICULTY: 0
WEAKNESS: 0
HALLUCINATIONS: 0

## 2024-03-29 ASSESSMENT — ACTIVITIES OF DAILY LIVING (ADL)
GROOMING: NEEDS ASSISTANCE
HEARING - RIGHT EAR: FUNCTIONAL
DRESSING YOURSELF: NEEDS ASSISTANCE
PATIENT'S MEMORY ADEQUATE TO SAFELY COMPLETE DAILY ACTIVITIES?: NO
ASSISTIVE_DEVICE: WALKER
BATHING_ASSISTANCE: MAXIMAL
BATHING: NEEDS ASSISTANCE
ADL_ASSISTANCE: NEEDS ASSISTANCE
LACK_OF_TRANSPORTATION: NO
ADL_ASSISTANCE: NEEDS ASSISTANCE
WALKS IN HOME: NEEDS ASSISTANCE
TOILETING: NEEDS ASSISTANCE
HEARING - LEFT EAR: FUNCTIONAL
FEEDING YOURSELF: NEEDS ASSISTANCE
ADEQUATE_TO_COMPLETE_ADL: YES
JUDGMENT_ADEQUATE_SAFELY_COMPLETE_DAILY_ACTIVITIES: YES

## 2024-03-29 ASSESSMENT — COGNITIVE AND FUNCTIONAL STATUS - GENERAL
PERSONAL GROOMING: A LOT
HELP NEEDED FOR BATHING: A LOT
MOVING TO AND FROM BED TO CHAIR: A LITTLE
PERSONAL GROOMING: A LITTLE
TOILETING: TOTAL
TURNING FROM BACK TO SIDE WHILE IN FLAT BAD: A LITTLE
DAILY ACTIVITIY SCORE: 11
STANDING UP FROM CHAIR USING ARMS: A LITTLE
WALKING IN HOSPITAL ROOM: A LOT
MOBILITY SCORE: 15
MOBILITY SCORE: 13
TOILETING: A LITTLE
MOVING FROM LYING ON BACK TO SITTING ON SIDE OF FLAT BED WITH BEDRAILS: A LITTLE
STANDING UP FROM CHAIR USING ARMS: A LOT
DRESSING REGULAR LOWER BODY CLOTHING: A LITTLE
PATIENT BASELINE BEDBOUND: NO
DAILY ACTIVITIY SCORE: 18
EATING MEALS: A LITTLE
CLIMB 3 TO 5 STEPS WITH RAILING: TOTAL
DRESSING REGULAR LOWER BODY CLOTHING: TOTAL
WALKING IN HOSPITAL ROOM: A LOT
MOVING TO AND FROM BED TO CHAIR: A LITTLE
MOVING FROM LYING ON BACK TO SITTING ON SIDE OF FLAT BED WITH BEDRAILS: A LOT
CLIMB 3 TO 5 STEPS WITH RAILING: A LOT
HELP NEEDED FOR BATHING: A LITTLE
DRESSING REGULAR UPPER BODY CLOTHING: A LOT
DRESSING REGULAR UPPER BODY CLOTHING: A LITTLE
EATING MEALS: A LITTLE
TURNING FROM BACK TO SIDE WHILE IN FLAT BAD: A LOT

## 2024-03-29 ASSESSMENT — LIFESTYLE VARIABLES
HOW MANY STANDARD DRINKS CONTAINING ALCOHOL DO YOU HAVE ON A TYPICAL DAY: PATIENT DOES NOT DRINK
HOW OFTEN DO YOU HAVE 6 OR MORE DRINKS ON ONE OCCASION: NEVER
AUDIT-C TOTAL SCORE: 0
SKIP TO QUESTIONS 9-10: 1
AUDIT-C TOTAL SCORE: 0
SUBSTANCE_ABUSE_PAST_12_MONTHS: NO
HOW OFTEN DO YOU HAVE A DRINK CONTAINING ALCOHOL: NEVER
PRESCIPTION_ABUSE_PAST_12_MONTHS: NO

## 2024-03-29 ASSESSMENT — PAIN - FUNCTIONAL ASSESSMENT
PAIN_FUNCTIONAL_ASSESSMENT: 0-10

## 2024-03-29 NOTE — CARE PLAN
Problem: Skin  Goal: Decreased wound size/increased tissue granulation at next dressing change  Outcome: Progressing  Flowsheets (Taken 3/29/2024 1203)  Decreased wound size/increased tissue granulation at next dressing change: Protective dressings over bony prominences  Goal: Participates in plan/prevention/treatment measures  Outcome: Progressing  Flowsheets (Taken 3/29/2024 1203)  Participates in plan/prevention/treatment measures: Discuss with provider PT/OT consult  Goal: Prevent/manage excess moisture  Outcome: Progressing  Flowsheets (Taken 3/29/2024 1203)  Prevent/manage excess moisture:   Cleanse incontinence/protect with barrier cream   Moisturize dry skin  Goal: Prevent/minimize sheer/friction injuries  Outcome: Progressing  Flowsheets (Taken 3/29/2024 1203)  Prevent/minimize sheer/friction injuries:   Use pull sheet   Increase activity/out of bed for meals   HOB 30 degrees or less  Goal: Promote/optimize nutrition  Outcome: Progressing  Flowsheets (Taken 3/29/2024 1203)  Promote/optimize nutrition:   Assist with feeding   Monitor/record intake including meals   Consume > 50% meals/supplements   Offer water/supplements/favorite foods  Goal: Promote skin healing  Outcome: Progressing  Flowsheets (Taken 3/29/2024 1203)  Promote skin healing:   Rotate device position/do not position patient on device   Assess skin/pad under line(s)/device(s)   Turn/reposition every 2 hours/use positioning/transfer devices   The patient's goals for the shift include FRANCESCO-Pt is confused    The clinical goals for the shift include maintain hemodynamic stability    Over the shift, the patient did not make progress toward the following goals. Barriers to progression include increased blood pressure. Recommendations to address these barriers include balance dietary intake and PRN medications.

## 2024-03-29 NOTE — DISCHARGE SUMMARY
Discharge Diagnosis  Aspiration into airway, initial encounter    Issues Requiring Follow-Up  Discharge to assisted living at the [facility] Meds     Your medication list        CONTINUE taking these medications        Instructions Last Dose Given Next Dose Due   acetaminophen 325 mg tablet  Commonly known as: Tylenol           aspirin 81 mg EC tablet           atorvastatin 10 mg tablet  Commonly known as: Lipitor           chlorhexidine 0.12 % solution  Commonly known as: Peridex      Use 15 mL in the mouth or throat every 12 hours for 14 days.       cholecalciferol 5,000 Units tablet  Commonly known as: Vitamin D-3           Eliquis 5 mg tablet  Generic drug: apixaban           guaiFENesin 600 mg 12 hr tablet  Commonly known as: Mucinex           guaiFENesin 100 mg/5 mL syrup  Commonly known as: Robitussin           hydrALAZINE 25 mg tablet  Commonly known as: Apresoline           isosorbide mononitrate ER 30 mg 24 hr tablet  Commonly known as: Imdur           losartan 100 mg tablet  Commonly known as: Cozaar           magnesium hydroxide 400 mg/5 mL suspension  Commonly known as: Milk of Magnesia           metFORMIN  mg 24 hr tablet  Commonly known as: Glucophage-XR           PlaqueniL 200 mg tablet  Generic drug: hydroxychloroquine           PRESERVISION AREDS-2 ORAL           Vitamin B-12 1,000 mcg tablet  Generic drug: cyanocobalamin                    Test Results Pending At Discharge  Pending Labs       Order Current Status    Blood Culture Preliminary result    Blood Culture Preliminary result            Hospital Course   83-year-old  female presents to the emergency room from assisted living at the [facility] she apparently had an episode of aspiration possibly during dinner with persistent cough she was hemodynamically stable her oxygen level is good she is a poor historian secondary to underlying comorbidities her  is also fairly poor historian according to the staff  relative to his comorbidities but they functionally do well at the [facility] living patient was evaluated by pulmonary medicine and was ultimately started on IV antibiotics and she underwent a bronchoscopy which showed no evidence of aspiration her chest x-ray was reviewed as well as CT scan of her chest showing no evidence of pneumonia antibiotics were discontinued the patient is on room air she is medically stable for discharge back to assisted living at the [place] with time spent 35 minutes    Pertinent Physical Exam At Time of Discharge  Physical Exam  Vitals and nursing note reviewed.   Constitutional:       Appearance: Normal appearance.   HENT:      Head: Normocephalic and atraumatic.      Mouth/Throat:      Mouth: Mucous membranes are moist.   Eyes:      Extraocular Movements: Extraocular movements intact.      Pupils: Pupils are equal, round, and reactive to light.   Cardiovascular:      Rate and Rhythm: Regular rhythm.      Pulses: Normal pulses.   Pulmonary:      Effort: Pulmonary effort is normal.      Breath sounds: Normal breath sounds.   Musculoskeletal:         General: Normal range of motion.      Cervical back: Normal range of motion and neck supple.   Skin:     General: Skin is warm and dry.      Capillary Refill: Capillary refill takes less than 2 seconds.   Neurological:      General: No focal deficit present.      Mental Status: She is alert and oriented to person, place, and time. Mental status is at baseline.   Psychiatric:         Mood and Affect: Mood normal.         Outpatient Follow-Up  No future appointments.      Dougie Youssef DO

## 2024-03-29 NOTE — PROCEDURES
"Speech-Language Pathology    Modified Barium Swallow Study     Patient Name: Estela Carnes  MRN: [id number]  : 1940  Today's Date: 24  Time Calculation  Start Time: 1015  Stop Time: 1040  Time Calculation (min): 25 min       Recommendations:  Regular solids with thin liquids     Assessment/Impression:    Full detailed SLP/Radiologist Modified Barium Swallow study report can be found under Chart Review tab, Imaging tab and  titled \"FL Modified Barium Swallow Study\"      Pt. Presenting with transient penetration without aspiration of thin liquids. No penetration nor aspiration of nectar liquids, honey liquids, puree, nor cookie consistencies. Overall oropharyngeal swallow function is WFL.      Plan:  Treatment/Interventions: No acute needs identified.  SLP Plan: No treatment needs identified at this time   SLP Frequency: N/A    Discussed POC: Patient, Nursing, physician   Discussed Risks/Benefits: Yes  Patient/Caregiver Agreeable: Yes    Pain:   Rating 0-10: Denies pain and does not appear in pain.  Location: n/a     GOALS:  No acute dysphagia needs identified.    Education:   Pt. And spouse Educated on results of MBS study, recommended diet and recommended safe swallow strategies. Results relayed to pt's care providers as well.     "

## 2024-03-29 NOTE — ANESTHESIA PREPROCEDURE EVALUATION
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   (+) Mixed hyperlipidemia   (+) Paroxysmal atrial fibrillation (CMS/HCC)      Pulmonary   (+) Obstructive sleep apnea syndrome      Neuro   (+) Anxiety      GI   (+) Gastroesophageal reflux disease without esophagitis      Endocrine   (+) Subclinical hypothyroidism      Hematology   (+) Anemia   (+) Iron deficiency anemia secondary to inadequate dietary iron intake      Musculoskeletal   (+) Lumbar spondylosis   (+) Osteoarthritis of multiple joints   (+) Primary osteoarthritis of both knees   (+) Primary osteoarthritis of left knee   (+) Primary osteoarthritis of right knee      HEENT   (+) Sinusitis      Skin   (+) Intrinsic eczema       Clinical information reviewed:    Allergies                NPO Detail:  No data recorded     Physical Exam    Airway  Mallampati: II  TM distance: >3 FB  Neck ROM: full     Cardiovascular - normal exam     Dental - normal exam     Pulmonary - normal exam     Abdominal - normal exam             Anesthesia Plan    History of general anesthesia?: yes  History of complications of general anesthesia?: no    ASA 2     MAC     The patient is not a current smoker.  Patient was not previously instructed to abstain from smoking on day of procedure.  Patient did not smoke on day of procedure.  Education provided regarding risk of obstructive sleep apnea.  intravenous induction   Postoperative administration of opioids is intended.  Trial extubation is planned.  Anesthetic plan and risks discussed with patient.  Use of blood products discussed with patient who consented to blood products.    Plan discussed with CAA, attending and CRNA.

## 2024-03-29 NOTE — PROGRESS NOTES
"Speech-Language Pathology    Inpatient Speech-Language Pathology Clinical Swallow Evaluation       Patient Name: Estela Carnes  MRN: [id number]  : 1940  Today's Date: 24  Time Calculation  Start Time: 820  Stop Time: 0853  Time Calculation (min): 33 min     MBSS SCHEDULED FOR TODAY 3/29/24 AT 10:00am    Reason for Consultation:  Pt referred for swallowing evaluation after having choking episode at [facility] with subsequent SOB.     Recommendations:  Risk for Aspiration: Yes   Solid Diet Recommendations : NPO   Liquid Diet Recommendations: Ice chips after oral care, Other (Comments) (Ice chips, sips, and meds pending MBSS)         Medication Administration Recommendations: Whole, With Liquid, With Pureed      Skilled dysphagia treatment  at next level of care TBD pending results of MBSS.     Assessment:  Consistencies Trialed:     Pt participated in diagnostic trials of 5 oz thin water (cues for 3 oz successively swallow, but pt unable to complete), and 1 deedee cracker.     OME and CNE are grossly WFL. Vocal quality and cough are perceptually WFL. Pt and spouse denies any recent rapid weight loss, and any recent pneumonias. Pt and spouse endorse intermittent coughing with PO intake, but states it just happens \"on occasion\"     Oral care completed prior to PO trials. Baseline dry coughing present.    Oral phase - Oral mucosa moist and normal in coloration. Mastication, bolus manipulation, and oral clearance timely and functional.     Pharyngeal phase - Although it is a limited assessment technique; Hyolaryngeal elevation/excursion assessed via digital palpation and appeared consistent across all trials. She demonstrates a dry cough following  25% of thin liquid trials.     Given pt's reported h/o dysphagia symptoms in conjunction with this acute and significant choking episode, recommend MBSS for further assessment of oropharyngeal swallow function.     D/W Pt and spouse who are in agreement.       Relevant " Imaging Results:  3/28/24 CT chest:   IMPRESSION:  Stable lobular soft tissue density in the right middle lobe.      No evidence of radiopaque foreign body.      Small hiatal hernia.    Plan:  SLP Plan: Skilled SLP   SLP Frequency: Other (Comment) (TBD pending MBSS)       Next Treatment Priority: MBSS at 10:00   Discussed POC: Patient, Nursing, Physician, Caregiver/family   Discussed Risks/Benefits: Yes, Patient, Nursing, Physician, Caregiver/Family   Patient/Caregiver Agreeable: Yes     Skilled dysphagia treatment is warranted due to for further education and training on dysphagia management including instruction on oropharyngeal therapeutic exercises and/or compensatory swallowing strategies    Goals:  1) Pt to participate in assessment of oropharyngeal swallow function via MBSS for assessment of possible aspiration and to determine least restrictive diet for meeting pt's nutritional and hydration needs.      Subjective   Pt is pleasantly confused. Spouse at bedside. Sitting upright in bed.     General Visit Information:  Past medical history:   Per H&P: History Of Present Illness  Esteal Carnes is a 83 y.o. female presenting with persistent cough.  This patient is quite poor historian and so is her .  Apparently they reside at [facility] and the patient started coughing after she had corn at dinner.  She denies aspiration but according to her  this may be the case.  According to the ER physician there was an episode of aspiration and due to persistent cough he discussed case with pulmonology who recommended observation for possible bronc tomorrow.  The patient however was not sure where she was and is [id number] he had trouble recognizing her  and remembering his name.  The  stated that she does have moments of confusion but usually not that bad.  The patient could not produce any other symptoms or complaints     Past Medical History  She has a past  medical history of A-fib (CMS/[facility]), Adrenal nodule (CMS/[facility]), Closed fracture of metatarsal bone (01/07/2024), Diabetes mellitus, type 2 (CMS/[facility]), DJD (degenerative joint disease), Foot fracture, left (10/2020), H/O colonoscopy, H/O mammogram (06/2019), H/O sleep study (2016), History of stress test, HTN (hypertension), L5 vertebral fracture (CMS/[facility]), Macular degeneration, bilateral, Osteopenia, and Vertigo.  Unclear if she has official diagnosis of dementia or cognitive decline of some sort     Pain:    Denies pain and does not appear in pain    Education:   Patient education completed regarding results and recommendations of SLP evaluation. Pt verbalizes understanding with all patient questions answered to satisfaction.     Care Team involvement:   Communicated with bedside nurse prior to evaluation with clearance for patient participation obtained, Results of the bedside swallowing evaluation were discussed with nurse and verbalized understanding was noted. , Results of bedside evaluation relayed to physician via [employer] secure chat, and Results of bedside evaluation verbally discussed with physician with verbalized understanding noted.

## 2024-03-29 NOTE — CONSULTS
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 Worsening confusion against a baseline decline in cognitive function that is not unusual in times of great distress and should pass time in observation.    Recommendations/Plans: No need for MRI brain or EEG. After clearing up her bronchial/pulmonary issues to reassure her and her  she may return home with outpatient Neurology follow up with Orlando Saxena MD ([facility]) who has seen her in the past,and can determine if or when Aricept/Namenda might assist in her care.      History Of Present Illness: Estela is an 83-year old woman lifelong non-smoker with a past medical history of hypertension, benign lung mass (biopsy proved 2019 as a fibroinflammatory process), atrial fibrillation on oral anticoagulation therapy (Eliquis) diabetes mellitus, osteoarthritis dyslipidemia osteopenia and diverticulitis known to have a cognitive decline last seen by Inpatient Neurology (Orlando Saxena MD) in October 2021 during hospitalized at [facility] for frequent dizziness/syncope (witnessed vent in hospital: telemetry negative, hypotensive on vitals, only seconds in patient) in October 2021, an MRI of the brain (10/7/2021) was negative for acute ischemia but showed atrophy and chronic deep white matter changes; MRA of the head and neck (10/7/2021) was normal, and an EEG (10/7/2021) showed a normal 9 Hz posterior dominant rhythm or sustained in the right hemisphere than the left with a continuous slow left hemisphere maximal at the left temporal region that is poorly organized with slower alpha and beta frequencies seen to  underlying and interrupt the background rhythm.  She is still known for frequent episodes of the same thing (hypotension slumped over episodes) yet no one ever mentions any seizure-like activity she recovered swiftly with no postictal prolongation of confusion.  She reports to hospital this time around after choking on corn at dinner concerned that it still may be retained there because of her prolonged coughing at stopped while she was in the emergency department.  Both her and her  are really liable historians for multiple medical personnel who have observed them. Last visit by primary care (Marlene Davidson, JODY-CNP (12/15/23) for her annual assisted living recertification assessment found her oriented x 1 noting that her  states that her cognition is declining and memory is not what it used to be but considers that (oriented x 1) her baseline.  She has nothing to her history.  Her  is just is forgetful and also useless in her history.    Current Facility-Administered Medications:     dextrose 50 % injection 12.5 g, 12.5 g, intravenous, q15 min PRN, Francisco Bazan MD    dextrose 50 % injection 25 g, 25 g, intravenous, q15 min PRN, Francisco Bazan MD    glucagon (Glucagen) injection 1 mg, 1 mg, intramuscular, q15 min PRN, Francisco Bazan MD    heparin (porcine) injection 5,000 Units, 5,000 Units, subcutaneous, q8h, Francisco Bazan MD    hydrALAZINE (Apresoline) injection 5 mg, 5 mg, intravenous, q4h PRN, Francisco Bazan MD, 5 mg at 03/29/24 0816    insulin lispro (HumaLOG) injection 0-10 Units, 0-10 Units, subcutaneous, q4h, Francisco Bazan MD    pantoprazole (ProtoNix) injection 40 mg, 40 mg, intravenous, Daily before breakfast, Francisco Bazan MD, 40 mg at 03/29/24 0628     Past Medical History:   Diagnosis Date    A-fib (CMS/HCC)     had successful cardioversion    Adrenal nodule (CMS/HCC)     1.7 cm stable on CT 8/11, US 2012 per   Jevnikar nodule resolved    Closed fracture of metatarsal bone 2024    Diabetes mellitus, type 2 (CMS/HCC)     DJD (degenerative joint disease)     Foot fracture, left 10/2020    stepped off the curb    H/O colonoscopy     Dr Alejandre  int hemorhoids, diverticulosis, saw Dr Blue and was told no further colonoscopies needed    H/O mammogram 2019    WNL    H/O sleep study     negative    History of stress test      Dr. Mckee neg, heart monitor per Dr.Van Bill , stress test neg 2019    HTN (hypertension)     L5 vertebral fracture (CMS/HCC)     had vertebroplasty    Macular degeneration, bilateral     Osteopenia     DEXA , DEXA , 10/2016 , 2019,hip -2.2, per Dr Anu Thao, gets Prolia    Vertigo      Past Surgical History:   Procedure Laterality Date    BIOPSY Right 2019    Right lung nodule     SECTION, CLASSIC      CHOLECYSTECTOMY      CT GUIDED IMAGING FOR NEEDLE PLACEMENT  2018    CT GUIDED IMAGING FOR NEEDLE PLACEMENT [facility]    CT GUIDED IMAGING FOR NEEDLE PLACEMENT  2019    CT GUIDED IMAGING FOR NEEDLE PLACEMENT [facility]    MR HEAD ANGIO WO IV CONTRAST  10/07/2021    MR HEAD ANGIO WO IV CONTRAST [facility]    MR NECK ANGIO WO IV CONTRAST  10/07/2021    MR NECK ANGIO WO IV CONTRAST [facility]    OVARIAN CYST REMOVAL       Social History     Tobacco Use    Smoking status: Never     Passive exposure: Never    Smokeless tobacco: Never   Substance Use Topics    Drug use: Never     Allergies  Ciprofloxacin    Medications Prior to Admission   Medication Sig Dispense Refill Last Dose    acetaminophen (Tylenol) 325 mg tablet Take 2 tablets (650 mg) by mouth every 4 hours if needed for headaches, fever (temp greater than 38.0 C) or mild pain (1 - 3).       aspirin 81 mg EC tablet Take 1 tablet (81 mg) by mouth 1 time.       atorvastatin (Lipitor) 10 mg tablet Take 1 tablet (10 mg) by mouth once daily.        chlorhexidine (Peridex) 0.12 % solution Use 15 mL in the mouth or throat every 12 hours for 14 days. 420 mL 0     cholecalciferol (Vitamin D-3) 5,000 Units tablet Take 1 capsule by mouth once daily.       cyanocobalamin (Vitamin B-12) 1,000 mcg tablet Take 1 tablet (1,000 mcg) by mouth once daily.       Eliquis 5 mg tablet Take by mouth 2 times a day.       guaiFENesin (Mucinex) 600 mg 12 hr tablet Take 1 tablet (600 mg) by mouth 2 times a day as needed for cough or congestion. Do not crush, chew, or split.       guaiFENesin (Robitussin) 100 mg/5 mL syrup Take 10 mL (200 mg) by mouth every 4 hours if needed for cough.       hydrALAZINE (Apresoline) 25 mg tablet Take 1 tablet (25 mg) by mouth 2 times a day.       isosorbide mononitrate ER (Imdur) 30 mg 24 hr tablet Take 1 tablet (30 mg) by mouth once daily.       losartan (Cozaar) 100 mg tablet Take 1 tablet (100 mg) by mouth once daily.       magnesium hydroxide (Milk of Magnesia) 400 mg/5 mL suspension Take 30 mL by mouth as needed at bedtime for constipation.       metFORMIN  mg 24 hr tablet Take 1 tablet (500 mg) by mouth 2 times a day with meals.       PlaqueniL 200 mg tablet Take 1 tablet (200 mg) by mouth 2 times a day.       vit C/E/Zn/coppr/lutein/zeaxan (PRESERVISION AREDS-2 ORAL) Take 1 tablet by mouth once daily.        Review of Systems   Reason unable to perform ROS: Unreliable due to her dementia.   Neurological:  Negative for dizziness, tremors, seizures, syncope, facial asymmetry, speech difficulty, weakness, light-headedness, numbness and headaches.   Psychiatric/Behavioral:  Positive for confusion. Negative for behavioral problems and hallucinations.      Neurological Exam  Mental Status  Awake and alert. Oriented only to person. Recalls 0 of 3 objects immediately. no dysarthria present.  She was unable to name 80% of the objects, using paraphasias instead.    Motor  Normal muscle bulk throughout. No fasciculations present. Normal muscle  tone. No abnormal involuntary movements. No pronator drift.    Sensory  Light touch is normal in upper and lower extremities.   Except she got left and right incorrect.    Reflexes                                            Right                      Left  Brachioradialis                    2+                         2+  Biceps                                 2+                         2+  Triceps                                2+                         2+  Patellar                                2+                         2+  Achilles                                0                         0  Right Plantar: downgoing  Left Plantar: downgoing  Deep tendon reflexes: She always misinterpreted the jerk as painful.    Right pathological reflexes: Tromner absent.  Left pathological reflexes: Tromner absent.  Glabellar tap absent. Snout absent. Right palmomental absent. Left palmomental absent. Right palmar grasp absent. Left palmar grasp absent.    Coordination    I lost her by this time and she did not want to cooperate.    Gait    Not tested.    Physical Exam  Constitutional:       General: She is awake.      Appearance: Normal appearance. She is normal weight.   HENT:      Head: Normocephalic and atraumatic.   Neurological:      Mental Status: She is alert.      Cranial Nerves: No dysarthria.      Deep Tendon Reflexes:      Reflex Scores:       Tricep reflexes are 2+ on the right side and 2+ on the left side.       Bicep reflexes are 2+ on the right side and 2+ on the left side.       Brachioradialis reflexes are 2+ on the right side and 2+ on the left side.       Patellar reflexes are 2+ on the right side and 2+ on the left side.       Achilles reflexes are 0 on the right side and 0 on the left side.  Psychiatric:      Comments: She quickly escalated into irritation frustration to be redirected as she misinterpreted pretty much everything that was being done to her as unnecessary         Last Recorded Vitals  Blood  "pressure 155/86, pulse 98, temperature 36.7 °C (98.1 °F), temperature source Temporal, resp. rate 22, height 1.549 m (5' 0.98\"), weight 61.6 kg (135 lb 12.9 oz), SpO2 93 %.    Medical Decision Making:   ECG 12 Lead (3/29/2024): Poor data quality, interpretation may be adversely affected Sinus rhythm with Premature supraventricular complexes Incomplete right bundle branch block Borderline ECG When compared with ECG of 28-DEC-2023 10:38, Premature supraventricular complexes are now Present    CT head without contrast (3/29/2024): Cerebral atrophy and chronic white matter small vessel ischemic change. No evidence of acute intracranial hemorrhage.      CT chest without contrast (3/29/2024): Stable lobular soft tissue density in the right middle lobe. No evidence of radiopaque foreign body.  Small hiatal hernia.     XR chest 2 views (3/28/2024): No evidence of acute intrathoracic abnormality.     Results for orders placed or performed during the hospital encounter of 03/28/24 (from the past 24 hour(s))   CBC and Auto Differential   Result Value Ref Range    WBC 8.3 4.4 - 11.3 x10*3/uL    nRBC 0.0 0.0 - 0.0 /100 WBCs    RBC 4.18 4.00 - 5.20 x10*6/uL    Hemoglobin 11.0 (L) 12.0 - 16.0 g/dL    Hematocrit 34.5 (L) 36.0 - 46.0 %    MCV 83 80 - 100 fL    MCH 26.3 26.0 - 34.0 pg    MCHC 31.9 (L) 32.0 - 36.0 g/dL    RDW 12.9 11.5 - 14.5 %    Platelets 297 150 - 450 x10*3/uL    Neutrophils % 66.4 40.0 - 80.0 %    Immature Granulocytes %, Automated 0.2 0.0 - 0.9 %    Lymphocytes % 17.1 13.0 - 44.0 %    Monocytes % 10.6 2.0 - 10.0 %    Eosinophils % 4.6 0.0 - 6.0 %    Basophils % 1.1 0.0 - 2.0 %    Neutrophils Absolute 5.49 1.60 - 5.50 x10*3/uL    Immature Granulocytes Absolute, Automated 0.02 0.00 - 0.50 x10*3/uL    Lymphocytes Absolute 1.42 0.80 - 3.00 x10*3/uL    Monocytes Absolute 0.88 (H) 0.05 - 0.80 x10*3/uL    Eosinophils Absolute 0.38 0.00 - 0.40 x10*3/uL    Basophils Absolute 0.09 0.00 - 0.10 x10*3/uL   Basic Metabolic " Panel   Result Value Ref Range    Glucose 133 (H) 65 - 99 mg/dL    Sodium 141 133 - 145 mmol/L    Potassium 4.2 3.4 - 5.1 mmol/L    Chloride 105 97 - 107 mmol/L    Bicarbonate 26 24 - 31 mmol/L    Urea Nitrogen 13 8 - 25 mg/dL    Creatinine 0.50 0.40 - 1.60 mg/dL    eGFR >90 >60 mL/min/1.73m*2    Calcium 9.2 8.5 - 10.4 mg/dL    Anion Gap 10 <=19 mmol/L   ECG 12 Lead   Result Value Ref Range    Ventricular Rate 74 BPM    Atrial Rate 74 BPM    MA Interval 162 ms    QRS Duration 110 ms    QT Interval 420 ms    QTC Calculation(Bazett) 466 ms    P Axis 70 degrees    R Axis -25 degrees    T Axis 24 degrees    QRS Count 12 beats    Q Onset 213 ms    P Onset 132 ms    P Offset 166 ms    T Offset 423 ms    QTC Fredericia 450 ms   POCT GLUCOSE   Result Value Ref Range    POCT Glucose 136 (H) 74 - 99 mg/dL   Hemoglobin A1C   Result Value Ref Range    Hemoglobin A1C 7.2 (H) See below %    Estimated Average Glucose 160 Not Established mg/dL   Magnesium   Result Value Ref Range    Magnesium 1.60 1.60 - 3.10 mg/dL   Blood Culture    Specimen: Peripheral Venipuncture; Blood culture   Result Value Ref Range    Blood Culture Loaded on Instrument - Culture in progress    NT-PROBNP   Result Value Ref Range    PROBNP 294 0 - 624 pg/mL   Serial Troponin, Initial ([facility])   Result Value Ref Range    Troponin T, High Sensitivity 58 (HH) <=14 ng/L   Blood Culture    Specimen: Peripheral Venipuncture; Blood culture   Result Value Ref Range    Blood Culture Loaded on Instrument - Culture in progress    Urinalysis with Reflex Microscopic   Result Value Ref Range    Color, Urine Light-Yellow Light-Yellow, Yellow, Dark-Yellow    Appearance, Urine Clear Clear    Specific Gravity, Urine 1.008 1.005 - 1.035    pH, Urine 6.0 5.0, 5.5, 6.0, 6.5, 7.0, 7.5, 8.0    Protein, Urine NEGATIVE NEGATIVE, 10 (TRACE), 20 (TRACE) mg/dL    Glucose, Urine Normal Normal mg/dL    Blood, Urine NEGATIVE NEGATIVE    Ketones, Urine NEGATIVE NEGATIVE mg/dL    Bilirubin,  Urine NEGATIVE NEGATIVE    Urobilinogen, Urine Normal Normal mg/dL    Nitrite, Urine NEGATIVE NEGATIVE    Leukocyte Esterase, Urine 500 Marla/µL (A) NEGATIVE   Microscopic Only, Urine   Result Value Ref Range    WBC, Urine 21-50 (A) 1-5, NONE /HPF    RBC, Urine 3-5 NONE, 1-2, 3-5 /HPF    Bacteria, Urine 1+ (A) NONE SEEN /HPF   CBC   Result Value Ref Range    WBC 8.8 4.4 - 11.3 x10*3/uL    nRBC 0.0 0.0 - 0.0 /100 WBCs    RBC 4.05 4.00 - 5.20 x10*6/uL    Hemoglobin 10.8 (L) 12.0 - 16.0 g/dL    Hematocrit 33.3 (L) 36.0 - 46.0 %    MCV 82 80 - 100 fL    MCH 26.7 26.0 - 34.0 pg    MCHC 32.4 32.0 - 36.0 g/dL    RDW 12.8 11.5 - 14.5 %    Platelets 273 150 - 450 x10*3/uL   Comprehensive metabolic panel   Result Value Ref Range    Glucose 131 (H) 65 - 99 mg/dL    Sodium 141 133 - 145 mmol/L    Potassium 3.8 3.4 - 5.1 mmol/L    Chloride 105 97 - 107 mmol/L    Bicarbonate 24 24 - 31 mmol/L    Urea Nitrogen 12 8 - 25 mg/dL    Creatinine 0.50 0.40 - 1.60 mg/dL    eGFR >90 >60 mL/min/1.73m*2    Calcium 9.3 8.5 - 10.4 mg/dL    Albumin 3.7 3.5 - 5.0 g/dL    Alkaline Phosphatase 91 35 - 125 U/L    Total Protein 6.4 5.9 - 7.9 g/dL    AST 35 5 - 40 U/L    Bilirubin, Total 0.3 0.1 - 1.2 mg/dL    ALT 22 5 - 40 U/L    Anion Gap 12 <=19 mmol/L   Serial Troponin, 2 Hour ([facility])   Result Value Ref Range    Troponin T, High Sensitivity 57 (HH) <=14 ng/L   POCT GLUCOSE   Result Value Ref Range    POCT Glucose 114 (H) 74 - 99 mg/dL   POCT GLUCOSE   Result Value Ref Range    POCT Glucose 118 (H) 74 - 99 mg/dL   Serial Troponin, 6 Hour ([facility])   Result Value Ref Range    Troponin T, High Sensitivity 49 (HH) <=14 ng/L   POCT GLUCOSE   Result Value Ref Range    POCT Glucose 130 (H) 74 - 99 mg/dL   POCT GLUCOSE   Result Value Ref Range    POCT Glucose 130 (H) 74 - 99 mg/dL     I personally spent 61 minutes (pre-visit review: 7:03 AM to 7:33 AM, in-person: 5:20 PM to 5:40 PM; and 6:17 PM to 6:28 PM in chart completion) providing care for this patient,  including preparation, verbal huddle with nursing, my direct face-to-face time with the patient/ Deo including education and counseling regarding her neurological condition and management plan, including chart documentation and other services, including review of her past medical records, imaging and other diagnostic results, and coordination of care as specified in the encounter.     Ryan Ernandez MD  [facility] Neurohospitalist  (contact by secure message preferred)

## 2024-03-29 NOTE — PROGRESS NOTES
03/29/24 1541   Discharge Planning   Living Arrangements Spouse/significant other   Support Systems Spouse/significant other   Type of Residence Assisted living   Care Facility Name [facility]   Home or Post Acute Services None   Patient expects to be discharged to: [facility].   Does the patient need discharge transport arranged? No     Met with patient and spouse at bedside to discuss discharge plan.  Patient is from [facility].  Patient has a written discharge order.  Reached out to Luiz advising of discharge.  Patient can have therapy if needed as arranged by Luiz.  Spouse in agreement with [facility] transporting patient.  Luiz in agreement with [facility].  They will notify family and arrange transportation for him.  RN [facility] following.    1610  Transportation arranged via [facility] to transport patient back to [facility].  Luiz aware.

## 2024-03-29 NOTE — PROGRESS NOTES
Occupational Therapy    Evaluation    Patient Name: Estela Carnes  MRN: [id number]  Today's Date: 3/29/2024  Time Calculation  Start Time: 1346  Stop Time: 1401  Time Calculation (min): 15 min    Assessment  IP OT Assessment  OT Assessment: Patient is an 83 year old female admitted s/p aspiration. Patient is presenting below baseline level with a decline in strength, balance, activity tolernace, and function, resulting in an increased need for assistance with ADL/IADL tasks. Skilled OT to address the above deficits in order to increase patient's safety and independence with daily tasks.  Prognosis: Good  Barriers to Discharge: None  Evaluation/Treatment Tolerance: Other (Comment) (confusion)  End of Session Communication: Bedside nurse  End of Session Patient Position: Bed, 3 rail up, Alarm on  Plan:  Treatment Interventions: ADL retraining, Functional transfer training, UE strengthening/ROM, Endurance training, Cognitive reorientation, Patient/family training, Neuromuscular reeducation, Compensatory technique education  OT Frequency: 2 times per week  OT Discharge Recommendations: Moderate intensity level of continued care, 24 hr supervision due to cognition  Equipment Recommended upon Discharge: Wheeled walker  OT Recommended Transfer Status: Moderate assist, Assist of 1  OT - OK to Discharge: Yes    Subjective   Current Problem:  1. Aspiration pneumonia due to inhalation of vomitus (CMS/HCC)        2. Acute cough        3. Aspiration into airway, initial encounter  Bronchoscopy Diagnostic    Bronchoscopy Diagnostic        General:  General  Reason for Referral: decline in ADLs  Referred By: Dr. Bazan  Past Medical History Relevant to Rehab: anemia, anxiety, AMS, vertigo, HLD, GERD, DM, paroxysmal a-fib  Family/Caregiver Present: Yes  Caregiver Feedback: spouse, who is pleasantly confused  Co-Treatment: PT  Co-Treatment Reason: safety with functional transfers  Prior to Session Communication: Bedside  nurse  Patient Position Received: Bed, 3 rail up, Alarm on  Preferred Learning Style: verbal  General Comment: Patient is an 83 year old female who presented to the ED with c/o acute cough. Patient admitted with aspiration in airway and aspiration pneumonia due to inhalation of vomitus. Patient is cleared by nursing for therapy. She is in bed upon arrival, pleasantly confused, but agreeable to participate  Precautions:  Medical Precautions: Fall precautions  Pain:  Pain Assessment  Pain Score: 0 - No pain    Objective   Cognition:  Overall Cognitive Status:  (patient is pleasantly confused. follows some commands with repetition)  Orientation Level: Disoriented to place, Disoriented to time, Disoriented to situation  Memory: Exceptions to WFL  Problem Solving: Exceptions to WFL  Insight: Severe  Processing Speed: Delayed     Home Living:  Type of Home: Assisted living ([facility])  Lives With: Spouse, Other (Comment) (care staff)  Home Adaptive Equipment: Walker rolling or standard  Home Living Comments: patient resides at [facility]   Prior Function:  Level of [place]: Independent with ADLs and functional transfers, Needs assistance with homemaking  Receives Help From: Other (Comment), Family (care staff, spouse assists)  ADL Assistance: Needs assistance (spouse assist with most ADLs, aides assist with showers)  Bath:  (aides assist with showers)  Homemaking Assistance:  (care staff complete)  Ambulatory Assistance: Independent (with 2WW)  Prior Function Comments: patient and her  are poor historians  IADL History:  Homemaking Responsibilities: No  IADL Comments: per care staff  ADL:  Eating Assistance: Stand by  Eating Deficit: Setup, Supervision/safety (per clinical judgement)  Grooming Assistance: Maximal  Grooming Deficit: Setup, Steadying, Verbal cueing, Supervision/safety, Increased time to complete (per clinical judgement)  Bathing Assistance: Maximal  Bathing Deficit: Steadying, Verbal cueing,  Supervision/safety, Increased time to complete , Right lower leg including foot, Left lower leg including foot, Buttocks, Perineal area (per clinical judgement)  UE Dressing Assistance: Maximal  UE Dressing Deficit: Thread RUE, Thread LUE, Pull over head, Pull around back, Pull down in back (per clinical judgement)  LE Dressing Assistance: Total  LE Dressing Deficit: Don/doff R shoe, Don/doff L shoe  Toileting Assistance with Device: Total  Toileting Deficit: Incontinent, Perineal hygiene (purewick)  Activity Tolerance:  Endurance: Decreased tolerance for upright activites  Bed Mobility/Transfers: Bed Mobility  Bed Mobility: Yes  Bed Mobility 1  Bed Mobility 1: Supine to sitting  Level of Assistance 1: Moderate assistance  Bed Mobility Comments 1: head of bed elevated, assist for trunk up and to move B LE  Bed Mobility 2  Bed Mobility  2: Sitting to supine  Level of Assistance 2: Moderate assistance  Bed Mobility Comments 2: assist to raise B LE and support trunk    Transfers  Transfer: Yes  Transfer 1  Transfer From 1: Bed to  Transfer to 1: Stand  Technique 1: Sit to stand  Transfer Device 1: Walker  Transfer Level of Assistance 1: Moderate assistance  Trials/Comments 1: cues for safety. patient stands ~1 minute    IADL's:   Homemaking Responsibilities: No  IADL Comments: per care staff  Vision: Vision - Basic Assessment  Current Vision: Wears glasses all the time  Sensation:  Sensation Comment: patient unable to report  Strength:  Strength Comments: B UE at least >/= 3/5 grossly. observed functionally  Extremities: RUE   RUE : Within Functional Limits (observed functionally) and LUE   LUE: Within Functional Limits (observed functionally)    Outcome Measures: [facility] Daily Activity  Putting on and taking off regular lower body clothing: Total  Bathing (including washing, rinsing, drying): A lot  Putting on and taking off regular upper body clothing: A lot  Toileting, which includes using toilet, bedpan or urinal:  Total  Taking care of personal grooming such as brushing teeth: A lot  Eating Meals: A little  Daily Activity - Total Score: 11    Education Documentation  Body Mechanics, taught by Alix Knox OT at 3/29/2024  2:47 PM.  Learner: Patient  Readiness: Acceptance  Method: Explanation, Demonstration  Response: No Evidence of Learning, Needs Reinforcement    Precautions, taught by Alxi Knox OT at 3/29/2024  2:47 PM.  Learner: Patient  Readiness: Acceptance  Method: Explanation, Demonstration  Response: No Evidence of Learning, Needs Reinforcement    ADL Training, taught by Alix Knox OT at 3/29/2024  2:47 PM.  Learner: Patient  Readiness: Acceptance  Method: Explanation, Demonstration  Response: No Evidence of Learning, Needs Reinforcement    Education Comments  No comments found.      Goals:   Encounter Problems       Encounter Problems (Active)       OT Goals       ADLs (Progressing)       Start:  03/29/24    Expected End:  04/19/24       Patient will complete ADL tasks with Close Supervision in order to increase safety and independence with self-care tasks.         Functional Transfers (Progressing)       Start:  03/29/24    Expected End:  04/19/24       Patient will complete functional transfers with Distant Supervision in order to increase safety and independence with daily tasks.         B UE Strengthening (Progressing)       Start:  03/29/24    Expected End:  04/19/24       Patient will increase B UE strength to 4/5 for functional transfers.         Functional Mobility (Progressing)       Start:  03/29/24    Expected End:  04/19/24       Patient will demonstrate the ability to complete item retrieval and functional mobility with Close Supervision in order to increase safety and independence with daily tasks.

## 2024-03-29 NOTE — H&P
History Of Present Illness  Estela Carnes is a 83 y.o. female presenting with persistent cough.  This patient is quite poor historian and so is her .  Apparently they reside at [facility] and the patient started coughing after she had corn at dinner.  She denies aspiration but according to her  this may be the case.  According to the ER physician there was an episode of aspiration and due to persistent cough he discussed case with pulmonology who recommended observation for possible bronc tomorrow.  The patient however was not sure where she was and is [id number] he had trouble recognizing her  and remembering his name.  The  stated that she does have moments of confusion but usually not that bad.  The patient could not produce any other symptoms or complaints     Past Medical History  She has a past medical history of A-fib (CMS/HCC), Adrenal nodule (CMS/HCC), Closed fracture of metatarsal bone (2024), Diabetes mellitus, type 2 (CMS/HCC), DJD (degenerative joint disease), Foot fracture, left (10/2020), H/O colonoscopy, H/O mammogram (2019), H/O sleep study (), History of stress test, HTN (hypertension), L5 vertebral fracture (CMS/HCC), Macular degeneration, bilateral, Osteopenia, and Vertigo.  Unclear if she has official diagnosis of dementia or cognitive decline of some sort  Surgical History  She has a past surgical history that includes CT guided imaging for needle placement (2018); CT guided imaging for needle placement (2019); MR angio head wo IV contrast (10/07/2021); MR angio neck wo IV contrast (10/07/2021);  section, classic; Cholecystectomy; Ovarian cyst removal; and biopsy (Right, 2019).  Reviewed  Social History  She reports that she has never smoked. She has never been exposed to tobacco smoke. She has never used smokeless tobacco. She reports that she does not use drugs. No history on file for alcohol  use.  Reviewed  Family History  Family History   Problem Relation Name Age of Onset    Diabetes Mother      Heart disease Father          Allergies  Ciprofloxacin    ROS  Review of Systems  Poor historian  Last Recorded Vitals  /83 (BP Location: Left arm, Patient Position: Lying)   Pulse 74   Temp 36.6 °C (97.9 °F) (Temporal)   Resp 16   Wt 70.3 kg (155 lb)   SpO2 99%     Physical Exam  I evaluated this patient emergency room 13 the presence of the ER nurse and the .  The patient is alert to name she was confused on place and could not recognize her  otherwise she was directable and was following commands  Normocephalic/atraumatic EOMI PERRLA  Neck supple  Chest clear  Heart regular  Abdomen soft  Extremities no peripheral edema good pedal pulses  Neurologic exam exam shows normal speech normal cranial nerves motor strength 5 out of 5 all 4 extremities well test in the bed gait was not tested  Psych she is confused but not actively delirious  Skin no rash    During the exam the patient coughed only occasionally couple times.  She was in no distress    Relevant Results  Patient Bolick panel okay hemoglobin 11 WBC count 8.3 CT chest showed soft tissue in the right middle lobe which was old no urinalysis no troponins were done EKG showed sinus rhythm with right bundle branch block    ASSESSMENT/PLAN  Assessment/Plan   Principal Problem:    Acute cough  Active Problems:    Aspiration into airway, initial encounter    Impression is for:  1.  Possible aspiration keep n.p.o. assess swallowing evaluation pulmonology to consider bronc tomorrow there is no respiratory distress at this point  2.  Confusion which may be baseline but the  himself is not a good historian.  It seems that confusion may be worse than usual.  In view of the uncertain history new cough would like to check CT of the brain and consider neurology evaluation  3.  Diabetes sliding scale  4.  History of hypertension  5.  Soft  tissue mass right middle lobe       Francisco Bazan MD

## 2024-03-29 NOTE — CARE PLAN
The patient's goals for the shift include FRANCESCO-Pt is confused    The clinical goals for the shift include Pt remains hemodynamically stable      Problem: Skin  Goal: Decreased wound size/increased tissue granulation at next dressing change  Outcome: Progressing  Flowsheets (Taken 3/29/2024 0451)  Decreased wound size/increased tissue granulation at next dressing change:   Promote sleep for wound healing   Utilize specialty bed per algorithm   Protective dressings over bony prominences  Goal: Participates in plan/prevention/treatment measures  Outcome: Progressing  Flowsheets (Taken 3/29/2024 0451)  Participates in plan/prevention/treatment measures:   Discuss with provider PT/OT consult   Elevate heels   Increase activity/out of bed for meals  Goal: Prevent/manage excess moisture  Outcome: Progressing  Flowsheets (Taken 3/29/2024 0451)  Prevent/manage excess moisture:   Cleanse incontinence/protect with barrier cream   Monitor for/manage infection if present  Goal: Prevent/minimize sheer/friction injuries  Outcome: Progressing  Flowsheets (Taken 3/29/2024 0451)  Prevent/minimize sheer/friction injuries:   Turn/reposition every 2 hours/use positioning/transfer devices   Utilize specialty bed per algorithm   Increase activity/out of bed for meals   HOB 30 degrees or less   Use pull sheet  Goal: Promote/optimize nutrition  Outcome: Progressing  Flowsheets (Taken 3/29/2024 0451)  Promote/optimize nutrition:   Offer water/supplements/favorite foods   Assist with feeding   Monitor/record intake including meals  Goal: Promote skin healing  Outcome: Progressing  Flowsheets (Taken 3/29/2024 0451)  Promote skin healing:   Rotate device position/do not position patient on device   Assess skin/pad under line(s)/device(s)   Turn/reposition every 2 hours/use positioning/transfer devices   Ensure correct size (line/device) and apply per  instructions   Protective dressings over bony prominences

## 2024-03-29 NOTE — DISCHARGE INSTRUCTIONS
From Neurology (Ryan Ernandez MD):  We found no signs this trip to hospital of any stroke or neurological concerns other than your amnesia, which is a known issue for you.   Please make an appointment to see Orlando Saxena MD ([facility]) for ongoing care of your brain health.

## 2024-03-29 NOTE — CARE PLAN
Problem: Balance  Goal: dynamic  Description: No LOB with dynamic sitting/standing activities, UE support of walker in standing  Outcome: Progressing     Problem: Mobility  Goal: bed mobility  Description: Pt will perform sup to/from sit transfer with supervision   Outcome: Progressing  Goal: ambulation  Description: Pt will amb > 75 ft with wheeled walker and CGA  Outcome: Progressing     Problem: Safety  Goal: LTG - Patient will utilize safety techniques  Outcome: Progressing     Problem: PT Transfers  Goal: sit to stand  Description: Pt will perform sit to stand transfer with walker and CGA  Outcome: Progressing  Goal: bed to chair  Description: Pt will perform bed to chair transfer with walker and CGA  Outcome: Progressing

## 2024-03-29 NOTE — ED PROVIDER NOTES
"HPI   Chief Complaint   Patient presents with    Aspiration     Pt was eating dinner and thought something went \"down the wrong pipe\". Pt hasn't stopped coughing since and states she feels something in her throat.       Patient presents with frequent cough.  She reports that while she was eating dinner, she thinks that she aspirated some food.  She has been coughing since.  She denies any pain in the chest or shortness of breath.  She has been able to eat and drink since then.                          No data recorded                   Patient History   Past Medical History:   Diagnosis Date    A-fib (CMS/HCC)     had successful cardioversion    Adrenal nodule (CMS/HCC)     1.7 cm stable on CT , US  per Dr. Brooke nodule resolved    Closed fracture of metatarsal bone 2024    Diabetes mellitus, type 2 (CMS/HCC)     DJD (degenerative joint disease)     Foot fracture, left 10/2020    stepped off the curb    H/O colonoscopy     Dr Alejandre  int hemorhoids, diverticulosis, saw Dr Blue and was told no further colonoscopies needed    H/O mammogram 2019    WNL    H/O sleep study 2016    negative    History of stress test      Dr. Mckee neg, heart monitor per Dr.Van Bill , stress test neg 2019    HTN (hypertension)     L5 vertebral fracture (CMS/HCC)     had vertebroplasty    Macular degeneration, bilateral     Osteopenia     DEXA , DEXA , 10/2016 , 2019,hip -2.2, per Dr Anu Thao, gets Prolia    Vertigo      Past Surgical History:   Procedure Laterality Date    BIOPSY Right 2019    Right lung nodule     SECTION, CLASSIC      CHOLECYSTECTOMY      CT GUIDED IMAGING FOR NEEDLE PLACEMENT  2018    CT GUIDED IMAGING FOR NEEDLE PLACEMENT [facility]    CT GUIDED IMAGING FOR NEEDLE PLACEMENT  2019    CT GUIDED IMAGING FOR NEEDLE PLACEMENT [facility]    MR HEAD ANGIO WO IV CONTRAST  10/07/2021    MR HEAD ANGIO WO IV CONTRAST [facility]    MR NECK ANGIO WO IV CONTRAST  10/07/2021    MR NECK ANGIO WO IV CONTRAST [facility]    OVARIAN CYST REMOVAL       Family History   Problem Relation Name Age of Onset    Diabetes Mother      Heart disease Father       Social History     Tobacco Use    Smoking status: Never     Passive exposure: Never    Smokeless tobacco: Never   Substance Use Topics    Alcohol use: Not on file    Drug use: Never       Physical Exam   ED Triage Vitals [03/28/24 2015]   Temperature Heart Rate Respirations BP   36.6 °C (97.9 °F) 82 16 (!) 198/96      Pulse Ox Temp Source Heart Rate Source Patient Position   98 % Temporal Monitor Sitting      BP Location FiO2 (%)     Right arm --       Physical Exam  HENT:      Head: Normocephalic.   Eyes:      General: No scleral icterus.  Cardiovascular:      Rate and Rhythm: Normal rate.   Pulmonary:      Effort: Pulmonary effort is normal.      Breath sounds: Normal breath sounds.   Neurological:      Mental Status: She is alert. Mental status is at baseline.         ED Course & MDM   ED Course as of 03/29/24 0149   Fri Mar 29, 2024   0054 EKG interpretation: Sinus rhythm, rate 74.  Normal axis.  Right bundle branch block morphology.  No significant ST or T wave abnormalities. [ML]      ED Course User Index  [ML] Broderick Gore MD         Diagnoses as of 03/29/24 0149   Acute cough   Aspiration into airway, initial encounter       Medical Decision Making  Patient presents with frequent cough.  With suspicion for aspiration, chest x-ray and then chest CT were obtained without acute findings.  Patient is able to swallow fluids without difficulty.  Patient denies any shortness of breath, has normal respiratory rate, and normal oxygen saturation.  My suspicion for upper airway obstruction is very low.  I feel that patient likely has aspirated into her lungs given her frequent ongoing cough.  I discussed patient's case with pulmonology, Dr. Burns, who recommends that patient have  bronchoscopy and be kept n.p.o. after midnight.  Patient accepted to medicine service for further management.  Parts of this chart were completed with dictation software, please excuse any errors in transcription.        Procedure  Procedures     Broderick Gore MD  03/29/24 0150

## 2024-03-29 NOTE — PROGRESS NOTES
Physical Therapy    Physical Therapy Evaluation    Patient Name: Estela Carnes  MRN: [id number]  Today's Date: 3/29/2024   Time Calculation  Start Time: 1351  Stop Time: 1402  Time Calculation (min): 11 min    Assessment/Plan   PT Assessment  PT Assessment Results: Decreased strength, Decreased endurance, Impaired balance, Decreased mobility, Decreased coordination, Decreased cognition, Impaired judgement, Decreased safety awareness  Rehab Prognosis: Fair  Medical Staff Made Aware: Yes  End of Session Communication: Bedside nurse  End of Session Patient Position: Bed, 3 rail up, Alarm on    Assessment Comment: 82 y/o female who presents as an increased falls risk due to impaired balance, decreased strength and decreased activity tolerance. Pt with decreased command following and safety awareness. Would benfit from cont PT services while in-house and post acute to maximize safe mobility.        IP OR SWING BED PT PLAN  Inpatient or Swing Bed: Inpatient  PT Plan  Treatment/Interventions: Bed mobility, Transfer training, Gait training, Balance training, Neuromuscular re-education, Strengthening, Endurance training, Therapeutic activity, Therapeutic exercise  PT Plan: Skilled PT  PT Frequency: 3 times per week  PT Discharge Recommendations: Moderate intensity level of continued care  PT Recommended Transfer Status: Assist x1, Assistive device  PT - OK to Discharge: Yes      Subjective   General Visit Information:  General  Reason for Referral: impaired mobility  Past Medical History Relevant to Rehab: anemia, anxiety, AMS, vertigo, HLD, GERD, DM, paroxysmal a-fib  Family/Caregiver Present: Yes  Caregiver Feedback: spouse, who is pleasantly confused  Co-Treatment: OT  Co-Treatment Reason: safety with functional transfers  Prior to Session Communication: Bedside nurse  Patient Position Received: Bed, 3 rail up, Alarm on  Preferred Learning Style: verbal  General Comment: Patient is an 83 year old female who presented to  the ED with c/o acute cough. Patient admitted with aspiration in airway and aspiration pneumonia due to inhalation of vomitus. Patient is cleared by nursing for therapy. She is in bed upon arrival, pleasantly confused, but agreeable to participate  Home Living:  Home Living  Type of Home: Assisted living ([facility])  Lives With: Spouse, Other (Comment) (care staff)  Home Adaptive Equipment: Walker rolling or standard  Home Living Comments: Pt resides at [place]  Prior Level of Function:  Prior Function Per Pt/Caregiver Report  Level of Curry: Independent with ADLs and functional transfers, Needs assistance with homemaking  Receives Help From: Other (Comment), Family (care staff, spouse assists)  ADL Assistance: Needs assistance (spouse assist with most ADLs, aides assist with showers)  Bath:  (aides assist with showers)  Homemaking Assistance:  (care staff complete)  Ambulatory Assistance: Independent (with 2WW)  Prior Function Comments: patient and her  are poor historians  Precautions:  Precautions  Medical Precautions: Fall precautions  Vital Signs:  Vital Signs  Heart Rate: 101  Resp: 20  SpO2: 94 %    Objective   Pain:  Pain Assessment  Pain Score: 0 - No pain  Cognition:  Cognition  Overall Cognitive Status: Impaired (pt is pleasantly confused. Able to follow abotu 50% of simple, motor commands)  Orientation Level: Disoriented to place, Disoriented to time, Disoriented to situation  Memory: Exceptions to WFL  Problem Solving: Exceptions to WFL  Insight: Severe  Processing Speed: Delayed    General Assessments:  General Observation  General Observation: pleasantly confused     Activity Tolerance  Endurance: Decreased tolerance for upright activites    Sensation  Sensation Comment: patient unable to report        Postural Control  Posture Comment: flexed posture, rounded shoulders    Static Sitting Balance  Static Sitting-Level of Assistance: Close supervision  Static Sitting-Comment/Number of  Minutes: UE support  Dynamic Sitting Balance  Dynamic Sitting-Comments: CGa    Static Standing Balance  Static Standing-Level of Assistance: Minimum assistance  Static Standing-Comment/Number of Minutes: UE support of walker  Functional Assessments:  Bed Mobility 1  Bed Mobility 1: Supine to sitting  Level of Assistance 1: Moderate assistance  Bed Mobility Comments 1: HOB elevated  Bed Mobility 2  Bed Mobility  2: Sitting to supine  Level of Assistance 2: Moderate assistance    Transfer 1  Technique 1: Sit to stand, Stand to sit  Transfer Device 1: Walker  Transfer Level of Assistance 1: Moderate assistance  Trials/Comments 1: from elevated EOB. Cueing for safety.    Ambulation/Gait Training  Ambulation/Gait Training Performed: Yes  Ambulation/Gait Training 1  Surface 1: Level tile  Device 1: Rolling walker  Assistance 1: Minimum assistance  Quality of Gait 1:  (decreased jorden, minimal foot clearance)  Comments/Distance (ft) 1: small lateral sidesteps x 3 towards HOB  Extremity/Trunk Assessments:  RLE   RLE : Exceptions to WFL  Strength RLE  RLE Overall Strength: Greater than or equal to 3/5 as evidenced by functional mobility  LLE   LLE : Exceptions to WFL  Strength LLE  LLE Overall Strength: Greater than or equal to 3/5 as evidenced by functional mobility  Outcome Measures:  [facility] Basic Mobility  Turning from your back to your side while in a flat bed without using bedrails: A lot  Moving from lying on your back to sitting on the side of a flat bed without using bedrails: A lot  Moving to and from bed to chair (including a wheelchair): A little  Standing up from a chair using your arms (e.g. wheelchair or bedside chair): A little  To walk in hospital room: A lot  Climbing 3-5 steps with railing: Total  Basic Mobility - Total Score: 13    Encounter Problems       Encounter Problems (Active)       Balance       dynamic (Progressing)       Start:  03/29/24    Expected End:  04/12/24       No LOB with dynamic  sitting/standing activities, UE support of walker in standing            Mobility       bed mobility (Progressing)       Start:  03/29/24    Expected End:  04/12/24       Pt will perform sup to/from sit transfer with supervision          ambulation (Progressing)       Start:  03/29/24    Expected End:  04/12/24       Pt will amb > 75 ft with wheeled walker and CGA            PT Transfers       sit to stand (Progressing)       Start:  03/29/24    Expected End:  04/12/24       Pt will perform sit to stand transfer with walker and CGA         bed to chair (Progressing)       Start:  03/29/24    Expected End:  04/12/24       Pt will perform bed to chair transfer with walker and CGA            Safety       LTG - Patient will utilize safety techniques (Progressing)       Start:  03/29/24    Expected End:  04/12/24                   Education Documentation  Precautions, taught by Rebecca Hatfield PT at 3/29/2024  3:15 PM.  Learner: Patient  Readiness: Acceptance  Method: Explanation  Response: Needs Reinforcement    Body Mechanics, taught by Rebecca Hatfield PT at 3/29/2024  3:15 PM.  Learner: Patient  Readiness: Acceptance  Method: Explanation  Response: Needs Reinforcement    Mobility Training, taught by Rebecca Hatfield PT at 3/29/2024  3:15 PM.  Learner: Patient  Readiness: Acceptance  Method: Explanation  Response: Needs Reinforcement    Education Comments  No comments found.

## 2024-03-29 NOTE — ANESTHESIA POSTPROCEDURE EVALUATION
Patient: Estela Carnes    Procedure Summary       Date: 03/29/24 Room / Location: [facility]– [place]    Anesthesia Start: 1148 Anesthesia Stop: 1213    Procedure: BRONCHOSCOPY Diagnosis: Aspiration into airway, initial encounter    Scheduled Providers: Jeancarlos Partida MD Responsible Provider: Celio Armando MD    Anesthesia Type: MAC ASA Status: 2            Anesthesia Type: MAC    Vitals Value Taken Time   /101 03/29/24 1211   Temp 36 03/29/24 1213   Pulse 103 03/29/24 1213   Resp 22 03/29/24 1213   SpO2 93 % 03/29/24 1213   Vitals shown include unvalidated device data.    Anesthesia Post Evaluation    Patient location during evaluation: PACU  Patient participation: complete - patient participated  Level of consciousness: sleepy but conscious  Pain score: 2  Pain management: adequate  Multimodal analgesia pain management approach  Airway patency: patent  Cardiovascular status: acceptable  Respiratory status: acceptable  Hydration status: acceptable  Postoperative Nausea and Vomiting: none        No notable events documented.

## 2024-03-29 NOTE — CONSULTS
"Inpatient consult to Pulmonology  Consult performed by: Jeancarlos Partida MD  Consult ordered by: Francisco Bazan MD          Pulmonary Consult    Reason For Consult    History Of Present Illness  Estela Carnes is a 83 y.o. female presenting with Aspiration into airway, initial encounter.     Past Medical History  She has a past medical history of A-fib (CMS/[facility]), Adrenal nodule (CMS/[facility]), Closed fracture of metatarsal bone (2024), Diabetes mellitus, type 2 (CMS/[facility]), DJD (degenerative joint disease), Foot fracture, left (10/2020), H/O colonoscopy, H/O mammogram (2019), H/O sleep study (), History of stress test, HTN (hypertension), L5 vertebral fracture (CMS/[facility]), Macular degeneration, bilateral, Osteopenia, and Vertigo.    Surgical History  She has a past surgical history that includes CT guided imaging for needle placement (2018); CT guided imaging for needle placement (2019); MR angio head wo IV contrast (10/07/2021); MR angio neck wo IV contrast (10/07/2021);  section, classic; Cholecystectomy; Ovarian cyst removal; and biopsy (Right, 2019).     Social History  She reports that she has never smoked. She has never been exposed to tobacco smoke. She has never used smokeless tobacco. She reports that she does not use drugs. No history on file for alcohol use.        Family History  Family History   Problem Relation Name Age of Onset    Diabetes Mother      Heart disease Father          Allergies  Ciprofloxacin    Review of Systems    Last Recorded Vitals  Blood pressure 155/86, pulse 98, temperature 36.7 °C (98.1 °F), temperature source Temporal, resp. rate 22, height 1.549 m (5' 0.98\"), weight 61.6 kg (135 lb 12.9 oz), SpO2 93 %.    Intake/Output    Intake/Output Summary (Last 24 hours) at 3/29/2024 1601  Last data filed at 3/29/2024 1533  Gross per 24 hour   Intake 937.5 ml   Output 1350 ml   Net -412.5 ml       Physical Exam ...    Oxygen Therapy  SpO2: 93 " %  Medical Gas Therapy: None (Room air)  O2 Delivery Method: Nasal cannula (4L NC)       Lines and Tubes:  Peripheral IV 03/29/24 20 G Right;Anterior Forearm (Active)   Placement Date/Time: 03/29/24 0056   Size (Gauge): 20 G  Orientation: Right;Anterior  Location: Forearm  Site Prep: Alcohol  Technique: Anatomical landmarks  Insertion attempts: 1  Patient Tolerance: Tolerated well   Number of days: 0       External Urinary Catheter Female (Active)   Placement Date/Time: 03/29/24 0400   Hand Hygiene Completed: Yes  External Catheter Type: Female   Number of days: 0         Scheduled medications  heparin (porcine), 5,000 Units, subcutaneous, q8h  insulin lispro, 0-10 Units, subcutaneous, q4h  pantoprazole, 40 mg, intravenous, Daily before breakfast      Continuous medications     PRN medications  PRN medications: dextrose, dextrose, glucagon, hydrALAZINE    Relevant Results  Results from last 7 days   Lab Units 03/29/24  0534 03/29/24  0039   WBC AUTO x10*3/uL 8.8 8.3   HEMOGLOBIN g/dL 10.8* 11.0*   HEMATOCRIT % 33.3* 34.5*   PLATELETS AUTO x10*3/uL 273 297      Results from last 7 days   Lab Units 03/29/24  0534 03/29/24  0039   SODIUM mmol/L 141 141   POTASSIUM mmol/L 3.8 4.2   CHLORIDE mmol/L 105 105   CO2 mmol/L 24 26   BUN mg/dL 12 13   CREATININE mg/dL 0.50 0.50   GLUCOSE mg/dL 131* 133*   CALCIUM mg/dL 9.3 9.2          XR chest 2 views 03/28/2024    Narrative  Interpreted By:  Dave Barragan,  STUDY:  XR CHEST 2 VIEWS    INDICATION:  Signs/Symptoms:possible aspiration while eating.    ACCESSION NUMBER(S):  [id number]    ORDERING CLINICIAN:  ANN CHURCHILL    FINDINGS:  No consolidation, effusion, edema, or pneumothorax.    Cardiomegaly unchanged from prior.    Impression  No evidence of acute intrathoracic abnormality.    Signed by: Dave Barragan 3/28/2024 10:16 PM  Dictation workstation:   [id number]      Assessment/Plan   Principal Problem:    Aspiration into airway, initial encounter  Active Problems:    Acute  cough    Aspiration pneumonia due to inhalation of vomitus (CMS/HCC)    Episode of coughing possible aspiration leading corn.  CT of the chest shows no signs of pneumonia or retained contents.  White count is normal.  Patient is on room air.  She appears no distress.  He has a mild nonspecific cough.    Low suspicion for true aspiration of material in the airway.  But given that she is significant dementia and is a very poor historian and her persistent cough, we will proceed bronchoscopy.        Jeancarlos Partida MD  [facility]

## 2024-03-29 NOTE — NURSING NOTE
Pt is confused.  A&Ox1. Adm documents  are completed with  -Deo at bedside. No Med list. Called [facility] mentor and talk to Kristy. She will fax med list over. Will follow up.

## 2024-03-30 ENCOUNTER — HOSPITAL ENCOUNTER (INPATIENT)
Facility: HOSPITAL | Age: 84
LOS: 6 days | Discharge: NURSING, REHAB OR LONG-TERM CARE | DRG: 481 | End: 2024-04-05
Attending: STUDENT IN AN ORGANIZED HEALTH CARE EDUCATION/TRAINING PROGRAM | Admitting: FAMILY MEDICINE
Payer: MEDICARE

## 2024-03-30 ENCOUNTER — TELEPHONE (OUTPATIENT)
Dept: POST ACUTE CARE | Facility: EXTERNAL LOCATION | Age: 84
End: 2024-03-30
Payer: MEDICARE

## 2024-03-30 ENCOUNTER — APPOINTMENT (OUTPATIENT)
Dept: CARDIOLOGY | Facility: HOSPITAL | Age: 84
DRG: 481 | End: 2024-03-30
Payer: MEDICARE

## 2024-03-30 ENCOUNTER — APPOINTMENT (OUTPATIENT)
Dept: RADIOLOGY | Facility: HOSPITAL | Age: 84
DRG: 481 | End: 2024-03-30
Payer: MEDICARE
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
## 2024-03-30 DIAGNOSIS — N39.0 URINARY TRACT INFECTION WITHOUT HEMATURIA, SITE UNSPECIFIED: ICD-10-CM

## 2024-03-30 DIAGNOSIS — S72.141A CLOSED DISPLACED INTERTROCHANTERIC FRACTURE OF RIGHT FEMUR, INITIAL ENCOUNTER (MULTI): Primary | ICD-10-CM

## 2024-03-30 DIAGNOSIS — R10.2 PELVIC PAIN: Primary | ICD-10-CM

## 2024-03-30 DIAGNOSIS — E11.9 DIABETES MELLITUS TYPE 2 IN NONOBESE (MULTI): ICD-10-CM

## 2024-03-30 DIAGNOSIS — K59.00 CONSTIPATION, UNSPECIFIED CONSTIPATION TYPE: ICD-10-CM

## 2024-03-30 DIAGNOSIS — I10 ESSENTIAL HYPERTENSION: ICD-10-CM

## 2024-03-30 DIAGNOSIS — I48.0 PAROXYSMAL ATRIAL FIBRILLATION (MULTI): ICD-10-CM

## 2024-03-30 DIAGNOSIS — E83.42 HYPOMAGNESEMIA: ICD-10-CM

## 2024-03-30 LAB
ANION GAP SERPL CALC-SCNC: 13 MMOL/L
BASOPHILS # BLD AUTO: 0.03 X10*3/UL (ref 0–0.1)
BASOPHILS NFR BLD AUTO: 0.2 %
BUN SERPL-MCNC: 20 MG/DL (ref 8–25)
CALCIUM SERPL-MCNC: 9.1 MG/DL (ref 8.5–10.4)
CHLORIDE SERPL-SCNC: 99 MMOL/L (ref 97–107)
CO2 SERPL-SCNC: 27 MMOL/L (ref 24–31)
CREAT SERPL-MCNC: 0.9 MG/DL (ref 0.4–1.6)
EGFRCR SERPLBLD CKD-EPI 2021: 63 ML/MIN/1.73M*2
EOSINOPHIL # BLD AUTO: 0 X10*3/UL (ref 0–0.4)
EOSINOPHIL NFR BLD AUTO: 0 %
ERYTHROCYTE [DISTWIDTH] IN BLOOD BY AUTOMATED COUNT: 13.3 % (ref 11.5–14.5)
GLUCOSE SERPL-MCNC: 235 MG/DL (ref 65–99)
HCT VFR BLD AUTO: 36 % (ref 36–46)
HGB BLD-MCNC: 11.4 G/DL (ref 12–16)
IMM GRANULOCYTES # BLD AUTO: 0.05 X10*3/UL (ref 0–0.5)
IMM GRANULOCYTES NFR BLD AUTO: 0.4 % (ref 0–0.9)
INR PPP: 1.2 (ref 0.9–1.2)
LYMPHOCYTES # BLD AUTO: 0.76 X10*3/UL (ref 0.8–3)
LYMPHOCYTES NFR BLD AUTO: 5.5 %
MCH RBC QN AUTO: 26.2 PG (ref 26–34)
MCHC RBC AUTO-ENTMCNC: 31.7 G/DL (ref 32–36)
MCV RBC AUTO: 83 FL (ref 80–100)
MONOCYTES # BLD AUTO: 0.74 X10*3/UL (ref 0.05–0.8)
MONOCYTES NFR BLD AUTO: 5.4 %
NEUTROPHILS # BLD AUTO: 12.22 X10*3/UL (ref 1.6–5.5)
NEUTROPHILS NFR BLD AUTO: 88.5 %
NRBC BLD-RTO: 0 /100 WBCS (ref 0–0)
PLATELET # BLD AUTO: 288 X10*3/UL (ref 150–450)
POTASSIUM SERPL-SCNC: 4 MMOL/L (ref 3.4–5.1)
PROTHROMBIN TIME: 12.8 SECONDS (ref 9.3–12.7)
RBC # BLD AUTO: 4.35 X10*6/UL (ref 4–5.2)
SODIUM SERPL-SCNC: 139 MMOL/L (ref 133–145)
WBC # BLD AUTO: 13.8 X10*3/UL (ref 4.4–11.3)

## 2024-03-30 PROCEDURE — 73552 X-RAY EXAM OF FEMUR 2/>: CPT | Mod: RT

## 2024-03-30 PROCEDURE — 2500000001 HC RX 250 WO HCPCS SELF ADMINISTERED DRUGS (ALT 637 FOR MEDICARE OP): Performed by: FAMILY MEDICINE

## 2024-03-30 PROCEDURE — 80048 BASIC METABOLIC PNL TOTAL CA: CPT | Performed by: STUDENT IN AN ORGANIZED HEALTH CARE EDUCATION/TRAINING PROGRAM

## 2024-03-30 PROCEDURE — 1210000001 HC SEMI-PRIVATE ROOM DAILY

## 2024-03-30 PROCEDURE — 73552 X-RAY EXAM OF FEMUR 2/>: CPT | Mod: RIGHT SIDE | Performed by: RADIOLOGY

## 2024-03-30 PROCEDURE — 2500000001 HC RX 250 WO HCPCS SELF ADMINISTERED DRUGS (ALT 637 FOR MEDICARE OP): Performed by: STUDENT IN AN ORGANIZED HEALTH CARE EDUCATION/TRAINING PROGRAM

## 2024-03-30 PROCEDURE — 36415 COLL VENOUS BLD VENIPUNCTURE: CPT | Performed by: STUDENT IN AN ORGANIZED HEALTH CARE EDUCATION/TRAINING PROGRAM

## 2024-03-30 PROCEDURE — 85610 PROTHROMBIN TIME: CPT | Performed by: STUDENT IN AN ORGANIZED HEALTH CARE EDUCATION/TRAINING PROGRAM

## 2024-03-30 PROCEDURE — 85025 COMPLETE CBC W/AUTO DIFF WBC: CPT | Performed by: STUDENT IN AN ORGANIZED HEALTH CARE EDUCATION/TRAINING PROGRAM

## 2024-03-30 PROCEDURE — 93005 ELECTROCARDIOGRAM TRACING: CPT

## 2024-03-30 PROCEDURE — 99285 EMERGENCY DEPT VISIT HI MDM: CPT | Mod: 25

## 2024-03-30 PROCEDURE — 93010 ELECTROCARDIOGRAM REPORT: CPT | Performed by: INTERNAL MEDICINE

## 2024-03-30 RX ORDER — TRAMADOL HYDROCHLORIDE 50 MG/1
50 TABLET ORAL EVERY 8 HOURS PRN
Qty: 10 TABLET | Refills: 0 | Status: SHIPPED | OUTPATIENT
Start: 2024-03-30 | End: 2024-04-05 | Stop reason: HOSPADM

## 2024-03-30 RX ORDER — TRAMADOL HYDROCHLORIDE 50 MG/1
50 TABLET ORAL EVERY 8 HOURS PRN
Status: DISCONTINUED | OUTPATIENT
Start: 2024-03-30 | End: 2024-04-05

## 2024-03-30 RX ORDER — LOSARTAN POTASSIUM 100 MG/1
100 TABLET ORAL DAILY
Status: DISCONTINUED | OUTPATIENT
Start: 2024-03-31 | End: 2024-04-03

## 2024-03-30 RX ORDER — ACETAMINOPHEN 325 MG/1
650 TABLET ORAL EVERY 4 HOURS PRN
Status: DISCONTINUED | OUTPATIENT
Start: 2024-03-30 | End: 2024-04-05 | Stop reason: HOSPADM

## 2024-03-30 RX ORDER — MORPHINE SULFATE 2 MG/ML
2 INJECTION, SOLUTION INTRAMUSCULAR; INTRAVENOUS EVERY 4 HOURS PRN
Status: DISCONTINUED | OUTPATIENT
Start: 2024-03-30 | End: 2024-04-05

## 2024-03-30 RX ORDER — ACETAMINOPHEN 500 MG
1000 TABLET ORAL ONCE
Status: COMPLETED | OUTPATIENT
Start: 2024-03-30 | End: 2024-03-30

## 2024-03-30 RX ORDER — ISOSORBIDE MONONITRATE 30 MG/1
30 TABLET, EXTENDED RELEASE ORAL DAILY
Status: DISCONTINUED | OUTPATIENT
Start: 2024-03-31 | End: 2024-04-03

## 2024-03-30 RX ORDER — GUAIFENESIN 600 MG/1
600 TABLET, EXTENDED RELEASE ORAL 2 TIMES DAILY PRN
Status: DISCONTINUED | OUTPATIENT
Start: 2024-03-30 | End: 2024-04-05

## 2024-03-30 RX ORDER — LANOLIN ALCOHOL/MO/W.PET/CERES
1000 CREAM (GRAM) TOPICAL DAILY
Status: DISCONTINUED | OUTPATIENT
Start: 2024-03-31 | End: 2024-04-04

## 2024-03-30 RX ORDER — HYDRALAZINE HYDROCHLORIDE 25 MG/1
25 TABLET, FILM COATED ORAL 2 TIMES DAILY
Status: DISCONTINUED | OUTPATIENT
Start: 2024-03-30 | End: 2024-04-02

## 2024-03-30 RX ORDER — ATORVASTATIN CALCIUM 10 MG/1
10 TABLET, FILM COATED ORAL DAILY
Status: DISCONTINUED | OUTPATIENT
Start: 2024-03-31 | End: 2024-04-03

## 2024-03-30 RX ORDER — HYDROXYCHLOROQUINE SULFATE 200 MG/1
200 TABLET, FILM COATED ORAL 2 TIMES DAILY
Status: DISCONTINUED | OUTPATIENT
Start: 2024-03-30 | End: 2024-04-02

## 2024-03-30 RX ORDER — ASPIRIN 81 MG/1
81 TABLET ORAL ONCE
Status: COMPLETED | OUTPATIENT
Start: 2024-03-30 | End: 2024-03-30

## 2024-03-30 RX ORDER — ADHESIVE BANDAGE
30 BANDAGE TOPICAL NIGHTLY PRN
Status: DISCONTINUED | OUTPATIENT
Start: 2024-03-30 | End: 2024-04-05

## 2024-03-30 RX ADMIN — ASPIRIN 81 MG: 81 TABLET, COATED ORAL at 22:35

## 2024-03-30 RX ADMIN — HYDROXYCHLOROQUINE SULFATE 200 MG: 200 TABLET ORAL at 22:35

## 2024-03-30 RX ADMIN — TRAMADOL HYDROCHLORIDE 50 MG: 50 TABLET ORAL at 22:34

## 2024-03-30 RX ADMIN — ACETAMINOPHEN 1000 MG: 500 TABLET ORAL at 19:12

## 2024-03-30 RX ADMIN — HYDRALAZINE HYDROCHLORIDE 25 MG: 25 TABLET ORAL at 22:34

## 2024-03-30 SDOH — SOCIAL STABILITY: SOCIAL INSECURITY: ARE THERE ANY APPARENT SIGNS OF INJURIES/BEHAVIORS THAT COULD BE RELATED TO ABUSE/NEGLECT?: UNABLE TO ASSESS

## 2024-03-30 SDOH — SOCIAL STABILITY: SOCIAL INSECURITY: ABUSE: ADULT

## 2024-03-30 SDOH — SOCIAL STABILITY: SOCIAL INSECURITY: WERE YOU ABLE TO COMPLETE ALL THE BEHAVIORAL HEALTH SCREENINGS?: YES

## 2024-03-30 SDOH — SOCIAL STABILITY: SOCIAL INSECURITY: HAVE YOU HAD THOUGHTS OF HARMING ANYONE ELSE?: NO

## 2024-03-30 SDOH — SOCIAL STABILITY: SOCIAL INSECURITY: ARE YOU OR HAVE YOU BEEN THREATENED OR ABUSED PHYSICALLY, EMOTIONALLY, OR SEXUALLY BY ANYONE?: UNABLE TO ASSESS

## 2024-03-30 SDOH — SOCIAL STABILITY: SOCIAL INSECURITY: DOES ANYONE TRY TO KEEP YOU FROM HAVING/CONTACTING OTHER FRIENDS OR DOING THINGS OUTSIDE YOUR HOME?: UNABLE TO ASSESS

## 2024-03-30 SDOH — SOCIAL STABILITY: SOCIAL INSECURITY: HAS ANYONE EVER THREATENED TO HURT YOUR FAMILY OR YOUR PETS?: UNABLE TO ASSESS

## 2024-03-30 SDOH — SOCIAL STABILITY: SOCIAL INSECURITY: DO YOU FEEL UNSAFE GOING BACK TO THE PLACE WHERE YOU ARE LIVING?: UNABLE TO ASSESS

## 2024-03-30 SDOH — SOCIAL STABILITY: SOCIAL INSECURITY: DO YOU FEEL ANYONE HAS EXPLOITED OR TAKEN ADVANTAGE OF YOU FINANCIALLY OR OF YOUR PERSONAL PROPERTY?: UNABLE TO ASSESS

## 2024-03-30 ASSESSMENT — PAIN DESCRIPTION - ORIENTATION
ORIENTATION: RIGHT
ORIENTATION: RIGHT

## 2024-03-30 ASSESSMENT — COGNITIVE AND FUNCTIONAL STATUS - GENERAL
MOVING FROM LYING ON BACK TO SITTING ON SIDE OF FLAT BED WITH BEDRAILS: A LITTLE
CLIMB 3 TO 5 STEPS WITH RAILING: TOTAL
STANDING UP FROM CHAIR USING ARMS: TOTAL
DRESSING REGULAR UPPER BODY CLOTHING: A LITTLE
DRESSING REGULAR LOWER BODY CLOTHING: TOTAL
DAILY ACTIVITIY SCORE: 12
PATIENT BASELINE BEDBOUND: NO
TOILETING: TOTAL
EATING MEALS: A LITTLE
HELP NEEDED FOR BATHING: TOTAL
TURNING FROM BACK TO SIDE WHILE IN FLAT BAD: A LOT
WALKING IN HOSPITAL ROOM: TOTAL
MOVING TO AND FROM BED TO CHAIR: TOTAL
MOBILITY SCORE: 9
PERSONAL GROOMING: A LITTLE

## 2024-03-30 ASSESSMENT — PAIN - FUNCTIONAL ASSESSMENT
PAIN_FUNCTIONAL_ASSESSMENT: 0-10
PAIN_FUNCTIONAL_ASSESSMENT: 0-10

## 2024-03-30 ASSESSMENT — ACTIVITIES OF DAILY LIVING (ADL)
DRESSING YOURSELF: NEEDS ASSISTANCE
GROOMING: NEEDS ASSISTANCE
LACK_OF_TRANSPORTATION: PATIENT UNABLE TO ANSWER
WALKS IN HOME: NEEDS ASSISTANCE
TOILETING: NEEDS ASSISTANCE
HEARING - RIGHT EAR: FUNCTIONAL
ASSISTIVE_DEVICE: WALKER
JUDGMENT_ADEQUATE_SAFELY_COMPLETE_DAILY_ACTIVITIES: NO
PATIENT'S MEMORY ADEQUATE TO SAFELY COMPLETE DAILY ACTIVITIES?: NO
ADEQUATE_TO_COMPLETE_ADL: YES
FEEDING YOURSELF: NEEDS ASSISTANCE
BATHING: NEEDS ASSISTANCE
HEARING - LEFT EAR: FUNCTIONAL

## 2024-03-30 ASSESSMENT — LIFESTYLE VARIABLES
AUDIT-C TOTAL SCORE: -1
HOW OFTEN DO YOU HAVE A DRINK CONTAINING ALCOHOL: PATIENT UNABLE TO ANSWER
HOW OFTEN DO YOU HAVE 6 OR MORE DRINKS ON ONE OCCASION: PATIENT UNABLE TO ANSWER
HOW MANY STANDARD DRINKS CONTAINING ALCOHOL DO YOU HAVE ON A TYPICAL DAY: PATIENT UNABLE TO ANSWER
AUDIT-C TOTAL SCORE: -1
SKIP TO QUESTIONS 9-10: 0

## 2024-03-30 ASSESSMENT — PAIN DESCRIPTION - LOCATION
LOCATION: HIP
LOCATION: HIP

## 2024-03-30 ASSESSMENT — PAIN SCALES - GENERAL
PAINLEVEL_OUTOF10: 6
PAINLEVEL_OUTOF10: 9

## 2024-03-30 ASSESSMENT — PATIENT HEALTH QUESTIONNAIRE - PHQ9
SUM OF ALL RESPONSES TO PHQ9 QUESTIONS 1 & 2: 0
2. FEELING DOWN, DEPRESSED OR HOPELESS: NOT AT ALL
1. LITTLE INTEREST OR PLEASURE IN DOING THINGS: NOT AT ALL

## 2024-03-30 ASSESSMENT — COLUMBIA-SUICIDE SEVERITY RATING SCALE - C-SSRS
2. HAVE YOU ACTUALLY HAD ANY THOUGHTS OF KILLING YOURSELF?: NO
1. IN THE PAST MONTH, HAVE YOU WISHED YOU WERE DEAD OR WISHED YOU COULD GO TO SLEEP AND NOT WAKE UP?: NO
6. HAVE YOU EVER DONE ANYTHING, STARTED TO DO ANYTHING, OR PREPARED TO DO ANYTHING TO END YOUR LIFE?: NO

## 2024-03-30 NOTE — TELEPHONE ENCOUNTER
Spoke with nurse Reina at [facility]. Pt found on the floor sleeping this morning covered by blankets. Staff unsure if pt fell or put self on floor.  Pt now with c/o severe pelvic pain not relieved by tylenol. Pelvic Xray ordered. Tramadol 50 mg po every 8 hours as needed for sever pain for 3 days ordered. Latosha verbalizes understanding.

## 2024-03-30 NOTE — ED NOTES
PT Has edema to the right distal medial femur. Traction splint in place, per EMS> 3+ distal pulses present.      Jessica Gillis RN  03/30/24 8018

## 2024-03-30 NOTE — ED PROVIDER NOTES
HPI   Chief Complaint   Patient presents with   • Fall       83-year-old female presents after a fall.  Patient was recently seen in discharge from [facility] with aspiration pneumonia.  She made back to her facility when she was found this morning after having fallen out of bed.  She reportedly was found wrapped up in several blankets and unclear how she landed or fell.  X-rays performed at her facility showed a femur fracture and the patient was transferred here for evaluation.  At this time patient does not have any complaint.  Patient has a history of dementia.                        Gamaliel Coma Scale Score: 14                     Patient History   Past Medical History:   Diagnosis Date   • A-fib (CMS/HCC)     had successful cardioversion   • Adrenal nodule (CMS/HCC)     1.7 cm stable on CT , US  per Dr. Brooke nodule resolved   • Closed fracture of metatarsal bone 2024   • Diabetes mellitus, type 2 (CMS/HCC)    • DJD (degenerative joint disease)    • Foot fracture, left 10/2020    stepped off the curb   • H/O colonoscopy     Dr Alejandre  int hemorhoids, diverticulosis, saw Dr Blue and was told no further colonoscopies needed   • H/O mammogram 2019    WNL   • H/O sleep study 2016    negative   • History of stress test      Dr. Mckee neg, heart monitor per Dr.Van Bill , stress test neg 2019   • HTN (hypertension)    • L5 vertebral fracture (CMS/HCC)     had vertebroplasty   • Macular degeneration, bilateral    • Osteopenia     DEXA , DEXA , 10/2016 , 2019,hip -2.2, per Dr Anu Thao, gets Prolia   • Vertigo      Past Surgical History:   Procedure Laterality Date   • BIOPSY Right 2019    Right lung nodule   •  SECTION, CLASSIC     • CHOLECYSTECTOMY     • CT GUIDED IMAGING FOR NEEDLE PLACEMENT  2018    CT GUIDED IMAGING FOR NEEDLE PLACEMENT [facility]   • CT GUIDED IMAGING FOR NEEDLE PLACEMENT  2019    CT GUIDED IMAGING FOR  NEEDLE PLACEMENT [facility] CLINICAL LEGACY   • MR HEAD ANGIO WO IV CONTRAST  10/07/2021    MR HEAD ANGIO WO IV CONTRAST [facility] EMERGENCY LEGACY   • MR NECK ANGIO WO IV CONTRAST  10/07/2021    MR NECK ANGIO WO IV CONTRAST [facility] EMERGENCY LEGACY   • OVARIAN CYST REMOVAL       Family History   Problem Relation Name Age of Onset   • Diabetes Mother     • Heart disease Father       Social History     Tobacco Use   • Smoking status: Never     Passive exposure: Never   • Smokeless tobacco: Never   Substance Use Topics   • Alcohol use: Not on file   • Drug use: Never       Physical Exam   ED Triage Vitals [03/30/24 1726]   Temperature Heart Rate Respirations BP   36.2 °C (97.2 °F) 88 16 159/72      Pulse Ox Temp Source Heart Rate Source Patient Position   94 % Oral Monitor --      BP Location FiO2 (%)     -- --       Physical Exam  Vitals and nursing note reviewed.   Constitutional:       General: She is not in acute distress.     Appearance: She is not ill-appearing.   HENT:      Head: Normocephalic and atraumatic.      Mouth/Throat:      Mouth: Mucous membranes are moist.      Pharynx: Oropharynx is clear.   Eyes:      Extraocular Movements: Extraocular movements intact.      Conjunctiva/sclera: Conjunctivae normal.      Pupils: Pupils are equal, round, and reactive to light.   Cardiovascular:      Rate and Rhythm: Normal rate and regular rhythm.   Pulmonary:      Effort: Pulmonary effort is normal. No respiratory distress.      Breath sounds: Normal breath sounds.   Abdominal:      General: There is no distension.      Palpations: Abdomen is soft.      Tenderness: There is no abdominal tenderness.   Musculoskeletal:      Cervical back: Normal range of motion and neck supple.      Comments: Tenderness to palpation over right hip with swelling noted to the hip.  Pain with range of motion.  Patient currently in traction.   Skin:     General: Skin is warm and dry.      Capillary Refill: Capillary refill takes less than 2 seconds.    Neurological:      General: No focal deficit present.      Mental Status: She is alert. Mental status is at baseline.   Psychiatric:         Mood and Affect: Mood normal.         Behavior: Behavior normal.       ED Course & MDM   ED Course as of 03/30/24 1916   Sat Mar 30, 2024   1743 ECG 12 lead  Performed at  1741, HR of 89, NSR, NAD, QTc 491, no sign of STEMI, no Q wave or T wave abnormality noted.    Reviewed and interpreted by me at time performed   [JM]      ED Course User Index  [JM] Cristy Jimenez MD         Diagnoses as of 03/30/24 1916   Fall, initial encounter   Closed displaced intertrochanteric fracture of right femur, initial encounter (CMS/[facility])       Medical Decision Making  83 y.o. female presents with pain and swelling to extremity, XR shows intertrochanteric fracture with displacement.  No other signs MSK or other trauma by H&P.  I have considered the following with regards to this patient's condition: Contusion, hematoma, laceration, arthritis, cellulitis, DVT, joint dislocation, fracture.  Exam somewhat limited d/t pain and swelling, but with no obvious nerve or vascular injury.  No FB on exam or imaging.  No contamination, lac, or indication for antibx at this time.  Spoke with orthopedics who will evaluate the patient and take to the OR once medically cleared.  Patient admitted to medicine on call in stable condition.        Procedure  Procedures     Cristy Jimenez MD  03/30/24 1928

## 2024-03-30 NOTE — ED NOTES
Report received. This RN assumed care. Pt resting in bed and on the monitor. Family bedside.      Shireen Clark RN  03/30/24 6760

## 2024-03-31 LAB
ATRIAL RATE: 74 BPM
P AXIS: 70 DEGREES
P OFFSET: 166 MS
P ONSET: 132 MS
PR INTERVAL: 162 MS
Q ONSET: 213 MS
QRS COUNT: 12 BEATS
QRS DURATION: 110 MS
QT INTERVAL: 420 MS
QTC CALCULATION(BAZETT): 466 MS
QTC FREDERICIA: 450 MS
R AXIS: -25 DEGREES
T AXIS: 24 DEGREES
T OFFSET: 423 MS
VENTRICULAR RATE: 74 BPM

## 2024-03-31 PROCEDURE — 1210000001 HC SEMI-PRIVATE ROOM DAILY

## 2024-03-31 PROCEDURE — 2500000001 HC RX 250 WO HCPCS SELF ADMINISTERED DRUGS (ALT 637 FOR MEDICARE OP): Performed by: FAMILY MEDICINE

## 2024-03-31 PROCEDURE — 2500000004 HC RX 250 GENERAL PHARMACY W/ HCPCS (ALT 636 FOR OP/ED): Performed by: FAMILY MEDICINE

## 2024-03-31 RX ORDER — CEFTRIAXONE 1 G/50ML
1 INJECTION, SOLUTION INTRAVENOUS EVERY 24 HOURS
Status: DISCONTINUED | OUTPATIENT
Start: 2024-03-31 | End: 2024-04-02

## 2024-03-31 RX ORDER — ENOXAPARIN SODIUM 100 MG/ML
40 INJECTION SUBCUTANEOUS DAILY
Status: DISCONTINUED | OUTPATIENT
Start: 2024-03-31 | End: 2024-04-02

## 2024-03-31 RX ADMIN — TRAMADOL HYDROCHLORIDE 50 MG: 50 TABLET ORAL at 14:48

## 2024-03-31 RX ADMIN — HYDRALAZINE HYDROCHLORIDE 25 MG: 25 TABLET ORAL at 21:24

## 2024-03-31 RX ADMIN — ATORVASTATIN CALCIUM 10 MG: 10 TABLET, FILM COATED ORAL at 10:54

## 2024-03-31 RX ADMIN — ENOXAPARIN SODIUM 40 MG: 40 INJECTION SUBCUTANEOUS at 15:05

## 2024-03-31 RX ADMIN — ACETAMINOPHEN 650 MG: 325 TABLET ORAL at 21:26

## 2024-03-31 RX ADMIN — HYDROXYCHLOROQUINE SULFATE 200 MG: 200 TABLET ORAL at 21:24

## 2024-03-31 RX ADMIN — CEFTRIAXONE SODIUM 1 G: 1 INJECTION, SOLUTION INTRAVENOUS at 14:47

## 2024-03-31 RX ADMIN — Medication 1000 MCG: at 10:55

## 2024-03-31 RX ADMIN — HYDRALAZINE HYDROCHLORIDE 25 MG: 25 TABLET ORAL at 10:55

## 2024-03-31 RX ADMIN — ISOSORBIDE MONONITRATE 30 MG: 30 TABLET, EXTENDED RELEASE ORAL at 10:52

## 2024-03-31 RX ADMIN — LOSARTAN POTASSIUM 100 MG: 100 TABLET, FILM COATED ORAL at 10:54

## 2024-03-31 RX ADMIN — HYDROXYCHLOROQUINE SULFATE 200 MG: 200 TABLET ORAL at 10:54

## 2024-03-31 RX ADMIN — ACETAMINOPHEN 650 MG: 325 TABLET ORAL at 10:54

## 2024-03-31 ASSESSMENT — PAIN SCALES - PAIN ASSESSMENT IN ADVANCED DEMENTIA (PAINAD)
TOTALSCORE: 4
NEGVOCALIZATION: OCCASIONAL MOAN/GROAN, LOW SPEECH, NEGATIVE/DISAPPROVING QUALITY
CONSOLABILITY: NO NEED TO CONSOLE
BREATHING: NORMAL
CONSOLABILITY: NO NEED TO CONSOLE
TOTALSCORE: 1
TOTALSCORE: MEDICATION (SEE MAR)
NEGVOCALIZATION: OCCASIONAL MOAN/GROAN, LOW SPEECH, NEGATIVE/DISAPPROVING QUALITY
CONSOLABILITY: NO NEED TO CONSOLE
BODYLANGUAGE: TENSE, DISTRESSED PACING, FIDGETING
BODYLANGUAGE: RELAXED
TOTALSCORE: MEDICATION (SEE MAR)
BODYLANGUAGE: RELAXED
FACIALEXPRESSION: SMILING OR INEXPRESSIVE
TOTALSCORE: 0
BREATHING: NORMAL
FACIALEXPRESSION: SMILING OR INEXPRESSIVE
FACIALEXPRESSION: FACIAL GRIMACING
BREATHING: NORMAL

## 2024-03-31 ASSESSMENT — COGNITIVE AND FUNCTIONAL STATUS - GENERAL
CLIMB 3 TO 5 STEPS WITH RAILING: TOTAL
MOVING TO AND FROM BED TO CHAIR: TOTAL
TURNING FROM BACK TO SIDE WHILE IN FLAT BAD: A LOT
DAILY ACTIVITIY SCORE: 12
WALKING IN HOSPITAL ROOM: TOTAL
MOVING FROM LYING ON BACK TO SITTING ON SIDE OF FLAT BED WITH BEDRAILS: A LITTLE
DRESSING REGULAR UPPER BODY CLOTHING: A LITTLE
TOILETING: TOTAL
HELP NEEDED FOR BATHING: TOTAL
STANDING UP FROM CHAIR USING ARMS: TOTAL
DRESSING REGULAR LOWER BODY CLOTHING: TOTAL
EATING MEALS: A LITTLE
MOBILITY SCORE: 9
PERSONAL GROOMING: A LITTLE

## 2024-03-31 ASSESSMENT — PAIN - FUNCTIONAL ASSESSMENT
PAIN_FUNCTIONAL_ASSESSMENT: FLACC (FACE, LEGS, ACTIVITY, CRY, CONSOLABILITY)
PAIN_FUNCTIONAL_ASSESSMENT: PAINAD (PAIN ASSESSMENT IN ADVANCED DEMENTIA SCALE)
PAIN_FUNCTIONAL_ASSESSMENT: FLACC (FACE, LEGS, ACTIVITY, CRY, CONSOLABILITY)
PAIN_FUNCTIONAL_ASSESSMENT: 0-10
PAIN_FUNCTIONAL_ASSESSMENT: PAINAD (PAIN ASSESSMENT IN ADVANCED DEMENTIA SCALE)

## 2024-03-31 ASSESSMENT — PAIN DESCRIPTION - ORIENTATION
ORIENTATION: RIGHT
ORIENTATION: RIGHT

## 2024-03-31 ASSESSMENT — PAIN SCALES - WONG BAKER
WONGBAKER_NUMERICALRESPONSE: HURTS LITTLE MORE
WONGBAKER_NUMERICALRESPONSE: HURTS LITTLE MORE

## 2024-03-31 ASSESSMENT — PAIN SCALES - GENERAL
PAINLEVEL_OUTOF10: 0 - NO PAIN
PAINLEVEL_OUTOF10: 0 - NO PAIN

## 2024-03-31 ASSESSMENT — PAIN DESCRIPTION - LOCATION
LOCATION: HIP

## 2024-03-31 NOTE — ED NOTES
Attempted to call report to 420 nurse. Was placed on hold and no one picked up. Will reattempt shortly. Ana HENDERSON RN spoke with Dr. Collins and requested he place admission orders.      Shireen Clark RN  03/30/24 2040

## 2024-03-31 NOTE — NURSING NOTE
Patient in bed with eyes closed and snoring. No needs verbalized. Call light within reach. Bed alarm in use.

## 2024-03-31 NOTE — PROGRESS NOTES
Chart and x-rays reviewed.  Patient has a right hip intertrochanteric fracture.  Will plan for trochanteric nail right hip on Monday if medically clear.  Will await medical clearance given recent history of aspiration pneumonia.

## 2024-03-31 NOTE — H&P
History Of Present Illness  Estela Carnes is a 83 y.o. female with past medical history significant for diabetes mellitus type 2, osteoarthritis, rheumatoid arthritis, hypertension, remote history of L5 fracture, macular degeneration, osteopenia, dementia, atrial fibrillation presenting with several hours history of a fall that she sustained at [facility].  The nursing staff found her on the floor and complaining of right hip pain.  On arrival diagnosed with a right intertrochanteric fracture.  Admitted to a regular nursing floor for further management and operative treatment.  Recently she was treated at the [facility][note 3] for aspiration pneumonia.     Past Medical History  She has a past medical history of A-fib (CMS/HCC), Adrenal nodule (CMS/[facility]), Closed fracture of metatarsal bone (2024), Diabetes mellitus, type 2 (CMS/[facility]), DJD (degenerative joint disease), Foot fracture, left (10/2020), H/O colonoscopy, H/O mammogram (2019), H/O sleep study (), History of stress test, HTN (hypertension), L5 vertebral fracture (CMS/HCC), Macular degeneration, bilateral, Osteopenia, and Vertigo.    Surgical History  She has a past surgical history that includes CT guided imaging for needle placement (2018); CT guided imaging for needle placement (2019); MR angio head wo IV contrast (10/07/2021); MR angio neck wo IV contrast (10/07/2021);  section, classic; Cholecystectomy; Ovarian cyst removal; and biopsy (Right, 2019).     Social History  She reports that she has never smoked. She has never been exposed to tobacco smoke. She has never used smokeless tobacco. She reports that she does not use drugs. No history on file for alcohol use.    Family History  Family History   Problem Relation Name Age of Onset    Diabetes Mother      Heart disease Father          Allergies  Ciprofloxacin    Review of Systems    No chest pain, shortness of breath, no dizziness, no double  vision, no upper airway congestion, no nausea, no vomiting, no diarrhea, no abdominal pain, no fever, no chills, no dysuria    Physical Exam    General-awake, somewhat confused, oriented x1  Lung-clear to auscultation bilaterally  Heart-regular sinus rhythm and rhythm, S1 and S2 audible,no murmurs  Abdomen- soft, nontender, nondistended, good bowel sounds, no organomegaly, no mass, bowel sounds are present  Extremities-right lower extremity is externally rotated, tender over right hip joint, range of motion decreased, no crepitus  Wound-none applicable    Last Recorded Vitals  /73 (BP Location: Left arm, Patient Position: Lying)   Pulse 84   Temp 36.5 °C (97.7 °F) (Oral)   Resp 18   Wt 64 kg (141 lb 1.5 oz)   SpO2 95%     Relevant Results             Assessment/Plan   Principal Problem:    Closed displaced intertrochanteric fracture of right femur, initial encounter (CMS/[facility])  Pain control, will be going for surgery tomorrow per Ortho recommendation.  EKG reviewed that showed normal sinus rhythm with premature atrial complexes.  Right bundle branch block, no ST-T changes.  Cleared for surgery    Paroxysmal atrial fibrillation-will hold Eliquis for now and restart after surgery    Diabetes mellitus type 2-I will start Accu-Cheks before meals and at bedtime with Humalog sliding scale coverage    UTI-according to UA and C&S from 3/29.  I will start Rocephin 1 g IV every 24 hours empirically           Froylan Collins MD

## 2024-03-31 NOTE — NURSING NOTE
Patient arrived to floor. Very pleasant but confused. Easy to re-orient. Did not remember she fractured her hip. Patient cooperative with nursing and answered what she could as far as questions. Patient medicated as ordered and is lying comfortably in bed. Took all meds whole with water without issue. Purewick in place. Bathed upon admission by PCA. Patient awaiting medical clearance and surgery planned for Monday possibly.

## 2024-03-31 NOTE — NURSING NOTE
Patient lying in bed with eyes closed. Breathing equal and unlabored. PCA in room talking with patient. Patient does not remember she broke her hip. Denies need for pain meds at that time. Call light within reach. Bed alarm in use.

## 2024-04-01 ENCOUNTER — ANESTHESIA EVENT (OUTPATIENT)
Dept: OPERATING ROOM | Facility: HOSPITAL | Age: 84
DRG: 481 | End: 2024-04-01
Payer: MEDICARE

## 2024-04-01 ENCOUNTER — ANESTHESIA (OUTPATIENT)
Dept: OPERATING ROOM | Facility: HOSPITAL | Age: 84
DRG: 481 | End: 2024-04-01
Payer: MEDICARE

## 2024-04-01 ENCOUNTER — APPOINTMENT (OUTPATIENT)
Dept: RADIOLOGY | Facility: HOSPITAL | Age: 84
DRG: 481 | End: 2024-04-01
Payer: MEDICARE

## 2024-04-01 PROBLEM — J44.9 CHRONIC OBSTRUCTIVE PULMONARY DISEASE (MULTI): Status: ACTIVE | Noted: 2024-04-01

## 2024-04-01 LAB
ATRIAL RATE: 78 BPM
ATRIAL RATE: 89 BPM
GLUCOSE BLD MANUAL STRIP-MCNC: 144 MG/DL (ref 74–99)
P AXIS: 85 DEGREES
P AXIS: 85 DEGREES
P OFFSET: 185 MS
P OFFSET: 187 MS
P ONSET: 148 MS
P ONSET: 152 MS
PR INTERVAL: 152 MS
PR INTERVAL: 160 MS
Q ONSET: 228 MS
Q ONSET: 228 MS
QRS COUNT: 13 BEATS
QRS COUNT: 15 BEATS
QRS DURATION: 118 MS
QRS DURATION: 126 MS
QT INTERVAL: 404 MS
QT INTERVAL: 480 MS
QTC CALCULATION(BAZETT): 491 MS
QTC CALCULATION(BAZETT): 547 MS
QTC FREDERICIA: 460 MS
QTC FREDERICIA: 524 MS
R AXIS: -14 DEGREES
R AXIS: -26 DEGREES
T AXIS: 43 DEGREES
T AXIS: 5 DEGREES
T OFFSET: 430 MS
T OFFSET: 468 MS
VENTRICULAR RATE: 78 BPM
VENTRICULAR RATE: 89 BPM

## 2024-04-01 PROCEDURE — 82947 ASSAY GLUCOSE BLOOD QUANT: CPT

## 2024-04-01 PROCEDURE — 2500000004 HC RX 250 GENERAL PHARMACY W/ HCPCS (ALT 636 FOR OP/ED): Performed by: NURSE ANESTHETIST, CERTIFIED REGISTERED

## 2024-04-01 PROCEDURE — 9420000001 HC RT PATIENT EDUCATION 5 MIN

## 2024-04-01 PROCEDURE — 73501 X-RAY EXAM HIP UNI 1 VIEW: CPT

## 2024-04-01 PROCEDURE — 3700000001 HC GENERAL ANESTHESIA TIME - INITIAL BASE CHARGE: Performed by: ORTHOPAEDIC SURGERY

## 2024-04-01 PROCEDURE — 2500000005 HC RX 250 GENERAL PHARMACY W/O HCPCS: Performed by: NURSE ANESTHETIST, CERTIFIED REGISTERED

## 2024-04-01 PROCEDURE — 0QS604Z REPOSITION RIGHT UPPER FEMUR WITH INTERNAL FIXATION DEVICE, OPEN APPROACH: ICD-10-PCS | Performed by: ORTHOPAEDIC SURGERY

## 2024-04-01 PROCEDURE — C1713 ANCHOR/SCREW BN/BN,TIS/BN: HCPCS | Performed by: ORTHOPAEDIC SURGERY

## 2024-04-01 PROCEDURE — A4649 SURGICAL SUPPLIES: HCPCS | Performed by: ORTHOPAEDIC SURGERY

## 2024-04-01 PROCEDURE — 27245 TREAT THIGH FRACTURE: CPT | Performed by: PHYSICIAN ASSISTANT

## 2024-04-01 PROCEDURE — 76000 FLUOROSCOPY <1 HR PHYS/QHP: CPT

## 2024-04-01 PROCEDURE — 2720000007 HC OR 272 NO HCPCS: Performed by: ORTHOPAEDIC SURGERY

## 2024-04-01 PROCEDURE — 2500000004 HC RX 250 GENERAL PHARMACY W/ HCPCS (ALT 636 FOR OP/ED): Performed by: ANESTHESIOLOGY

## 2024-04-01 PROCEDURE — A27245 PR OPEN FIX INTER/SUBTROCH FX,IMPLNT: Performed by: NURSE ANESTHETIST, CERTIFIED REGISTERED

## 2024-04-01 PROCEDURE — 2500000004 HC RX 250 GENERAL PHARMACY W/ HCPCS (ALT 636 FOR OP/ED): Mod: JZ | Performed by: ORTHOPAEDIC SURGERY

## 2024-04-01 PROCEDURE — 2500000001 HC RX 250 WO HCPCS SELF ADMINISTERED DRUGS (ALT 637 FOR MEDICARE OP): Performed by: ORTHOPAEDIC SURGERY

## 2024-04-01 PROCEDURE — 3700000002 HC GENERAL ANESTHESIA TIME - EACH INCREMENTAL 1 MINUTE: Performed by: ORTHOPAEDIC SURGERY

## 2024-04-01 PROCEDURE — 2780000003 HC OR 278 NO HCPCS: Performed by: ORTHOPAEDIC SURGERY

## 2024-04-01 PROCEDURE — A27245 PR OPEN FIX INTER/SUBTROCH FX,IMPLNT: Performed by: ANESTHESIOLOGY

## 2024-04-01 PROCEDURE — 3600000009 HC OR TIME - EACH INCREMENTAL 1 MINUTE - PROCEDURE LEVEL FOUR: Performed by: ORTHOPAEDIC SURGERY

## 2024-04-01 PROCEDURE — 3600000004 HC OR TIME - INITIAL BASE CHARGE - PROCEDURE LEVEL FOUR: Performed by: ORTHOPAEDIC SURGERY

## 2024-04-01 PROCEDURE — 7100000002 HC RECOVERY ROOM TIME - EACH INCREMENTAL 1 MINUTE: Performed by: ORTHOPAEDIC SURGERY

## 2024-04-01 PROCEDURE — 2500000001 HC RX 250 WO HCPCS SELF ADMINISTERED DRUGS (ALT 637 FOR MEDICARE OP): Performed by: FAMILY MEDICINE

## 2024-04-01 PROCEDURE — 99100 ANES PT EXTEME AGE<1 YR&>70: CPT | Performed by: ANESTHESIOLOGY

## 2024-04-01 PROCEDURE — 1210000001 HC SEMI-PRIVATE ROOM DAILY

## 2024-04-01 PROCEDURE — 7100000001 HC RECOVERY ROOM TIME - INITIAL BASE CHARGE: Performed by: ORTHOPAEDIC SURGERY

## 2024-04-01 DEVICE — 5.0MM TI LOCKING SCREW 34MM- FOR IM NAILS-STERILE: Type: IMPLANTABLE DEVICE | Site: HIP | Status: FUNCTIONAL

## 2024-04-01 DEVICE — TFNA FENESTRATED SCREW 95MM - STERILE
Type: IMPLANTABLE DEVICE | Site: HIP | Status: FUNCTIONAL
Brand: TFN-ADVANCE

## 2024-04-01 DEVICE — 10MM/130 DEG TI CANN TFNA 170MM - STERILE
Type: IMPLANTABLE DEVICE | Site: HIP | Status: FUNCTIONAL
Brand: TFN-ADVANCE

## 2024-04-01 RX ORDER — POLYETHYLENE GLYCOL 3350 17 G/17G
17 POWDER, FOR SOLUTION ORAL DAILY
Status: DISCONTINUED | OUTPATIENT
Start: 2024-04-01 | End: 2024-04-05

## 2024-04-01 RX ORDER — SODIUM CHLORIDE 9 MG/ML
80 INJECTION, SOLUTION INTRAVENOUS CONTINUOUS
Status: ACTIVE | OUTPATIENT
Start: 2024-04-01 | End: 2024-04-02

## 2024-04-01 RX ORDER — OXYCODONE HYDROCHLORIDE 5 MG/1
5 TABLET ORAL EVERY 4 HOURS PRN
Status: DISCONTINUED | OUTPATIENT
Start: 2024-04-01 | End: 2024-04-01 | Stop reason: HOSPADM

## 2024-04-01 RX ORDER — TRAMADOL HYDROCHLORIDE 50 MG/1
50 TABLET ORAL EVERY 8 HOURS PRN
Qty: 10 TABLET | Refills: 0 | OUTPATIENT
Start: 2024-04-01

## 2024-04-01 RX ORDER — NALOXONE HYDROCHLORIDE 0.4 MG/ML
0.2 INJECTION, SOLUTION INTRAMUSCULAR; INTRAVENOUS; SUBCUTANEOUS EVERY 5 MIN PRN
Status: DISCONTINUED | OUTPATIENT
Start: 2024-04-01 | End: 2024-04-05

## 2024-04-01 RX ORDER — LIDOCAINE HYDROCHLORIDE 10 MG/ML
INJECTION INFILTRATION; PERINEURAL AS NEEDED
Status: DISCONTINUED | OUTPATIENT
Start: 2024-04-01 | End: 2024-04-01

## 2024-04-01 RX ORDER — ACETAMINOPHEN 500 MG
500 TABLET ORAL ONCE
Status: COMPLETED | OUTPATIENT
Start: 2024-04-01 | End: 2024-04-01

## 2024-04-01 RX ORDER — ASPIRIN 325 MG
325 TABLET, DELAYED RELEASE (ENTERIC COATED) ORAL 2 TIMES DAILY
Status: DISCONTINUED | OUTPATIENT
Start: 2024-04-01 | End: 2024-04-02

## 2024-04-01 RX ORDER — IPRATROPIUM BROMIDE 0.5 MG/2.5ML
500 SOLUTION RESPIRATORY (INHALATION) ONCE
Status: DISCONTINUED | OUTPATIENT
Start: 2024-04-01 | End: 2024-04-01 | Stop reason: HOSPADM

## 2024-04-01 RX ORDER — ONDANSETRON HYDROCHLORIDE 2 MG/ML
INJECTION, SOLUTION INTRAVENOUS AS NEEDED
Status: DISCONTINUED | OUTPATIENT
Start: 2024-04-01 | End: 2024-04-01

## 2024-04-01 RX ORDER — ACETAMINOPHEN 325 MG/1
650 TABLET ORAL EVERY 6 HOURS SCHEDULED
Status: DISCONTINUED | OUTPATIENT
Start: 2024-04-01 | End: 2024-04-05 | Stop reason: HOSPADM

## 2024-04-01 RX ORDER — HYDROCODONE BITARTRATE AND ACETAMINOPHEN 5; 325 MG/1; MG/1
1 TABLET ORAL EVERY 4 HOURS PRN
Status: DISCONTINUED | OUTPATIENT
Start: 2024-04-01 | End: 2024-04-05

## 2024-04-01 RX ORDER — TRANEXAMIC ACID 100 MG/ML
INJECTION, SOLUTION INTRAVENOUS AS NEEDED
Status: DISCONTINUED | OUTPATIENT
Start: 2024-04-01 | End: 2024-04-01

## 2024-04-01 RX ORDER — ALBUTEROL SULFATE 0.83 MG/ML
2.5 SOLUTION RESPIRATORY (INHALATION) ONCE AS NEEDED
Status: DISCONTINUED | OUTPATIENT
Start: 2024-04-01 | End: 2024-04-01 | Stop reason: HOSPADM

## 2024-04-01 RX ORDER — PROPOFOL 10 MG/ML
INJECTION, EMULSION INTRAVENOUS AS NEEDED
Status: DISCONTINUED | OUTPATIENT
Start: 2024-04-01 | End: 2024-04-01

## 2024-04-01 RX ORDER — CEFAZOLIN SODIUM 2 G/100ML
2 INJECTION, SOLUTION INTRAVENOUS ONCE
Status: COMPLETED | OUTPATIENT
Start: 2024-04-01 | End: 2024-04-01

## 2024-04-01 RX ORDER — ASPIRIN 81 MG/1
81 TABLET ORAL 2 TIMES DAILY
Qty: 60 TABLET | Refills: 0 | OUTPATIENT
Start: 2024-04-01 | End: 2024-05-01

## 2024-04-01 RX ORDER — SODIUM CHLORIDE, SODIUM LACTATE, POTASSIUM CHLORIDE, CALCIUM CHLORIDE 600; 310; 30; 20 MG/100ML; MG/100ML; MG/100ML; MG/100ML
100 INJECTION, SOLUTION INTRAVENOUS CONTINUOUS
Status: DISCONTINUED | OUTPATIENT
Start: 2024-04-01 | End: 2024-04-01

## 2024-04-01 RX ORDER — ONDANSETRON HYDROCHLORIDE 2 MG/ML
4 INJECTION, SOLUTION INTRAVENOUS ONCE AS NEEDED
Status: DISCONTINUED | OUTPATIENT
Start: 2024-04-01 | End: 2024-04-01 | Stop reason: HOSPADM

## 2024-04-01 RX ORDER — HYDRALAZINE HYDROCHLORIDE 20 MG/ML
5 INJECTION INTRAMUSCULAR; INTRAVENOUS EVERY 30 MIN PRN
Status: DISCONTINUED | OUTPATIENT
Start: 2024-04-01 | End: 2024-04-01 | Stop reason: HOSPADM

## 2024-04-01 RX ORDER — CEFAZOLIN SODIUM 2 G/100ML
2 INJECTION, SOLUTION INTRAVENOUS EVERY 8 HOURS
Status: COMPLETED | OUTPATIENT
Start: 2024-04-01 | End: 2024-04-02

## 2024-04-01 RX ORDER — DIPHENHYDRAMINE HYDROCHLORIDE 50 MG/ML
12.5 INJECTION INTRAMUSCULAR; INTRAVENOUS ONCE AS NEEDED
Status: DISCONTINUED | OUTPATIENT
Start: 2024-04-01 | End: 2024-04-01 | Stop reason: HOSPADM

## 2024-04-01 RX ORDER — FENTANYL CITRATE 50 UG/ML
INJECTION, SOLUTION INTRAMUSCULAR; INTRAVENOUS AS NEEDED
Status: DISCONTINUED | OUTPATIENT
Start: 2024-04-01 | End: 2024-04-01

## 2024-04-01 RX ORDER — DOCUSATE SODIUM 100 MG/1
100 CAPSULE, LIQUID FILLED ORAL 2 TIMES DAILY
Status: DISCONTINUED | OUTPATIENT
Start: 2024-04-01 | End: 2024-04-05

## 2024-04-01 RX ADMIN — TRAMADOL HYDROCHLORIDE 50 MG: 50 TABLET ORAL at 06:12

## 2024-04-01 RX ADMIN — FENTANYL CITRATE 50 MCG: 0.05 INJECTION, SOLUTION INTRAMUSCULAR; INTRAVENOUS at 11:37

## 2024-04-01 RX ADMIN — DOCUSATE SODIUM 100 MG: 100 CAPSULE, LIQUID FILLED ORAL at 20:58

## 2024-04-01 RX ADMIN — ONDANSETRON HYDROCHLORIDE 4 MG: 2 INJECTION INTRAMUSCULAR; INTRAVENOUS at 12:10

## 2024-04-01 RX ADMIN — HYDRALAZINE HYDROCHLORIDE 5 MG: 20 INJECTION INTRAMUSCULAR; INTRAVENOUS at 12:48

## 2024-04-01 RX ADMIN — TRANEXAMIC ACID 2000 MG: 100 INJECTION, SOLUTION INTRAVENOUS at 11:17

## 2024-04-01 RX ADMIN — SODIUM CHLORIDE 80 ML/HR: 900 INJECTION, SOLUTION INTRAVENOUS at 13:45

## 2024-04-01 RX ADMIN — ACETAMINOPHEN 650 MG: 325 TABLET ORAL at 17:19

## 2024-04-01 RX ADMIN — ASPIRIN 325 MG: 325 TABLET, COATED ORAL at 20:59

## 2024-04-01 RX ADMIN — SODIUM CHLORIDE, SODIUM LACTATE, POTASSIUM CHLORIDE, AND CALCIUM CHLORIDE: 600; 310; 30; 20 INJECTION, SOLUTION INTRAVENOUS at 11:05

## 2024-04-01 RX ADMIN — HYDROMORPHONE HYDROCHLORIDE 0.2 MG: 0.2 INJECTION, SOLUTION INTRAMUSCULAR; INTRAVENOUS; SUBCUTANEOUS at 12:40

## 2024-04-01 RX ADMIN — CEFAZOLIN SODIUM 2 G: 2 INJECTION, SOLUTION INTRAVENOUS at 20:57

## 2024-04-01 RX ADMIN — FENTANYL CITRATE 100 MCG: 0.05 INJECTION, SOLUTION INTRAMUSCULAR; INTRAVENOUS at 11:17

## 2024-04-01 RX ADMIN — CEFAZOLIN SODIUM 2 G: 2 INJECTION, SOLUTION INTRAVENOUS at 11:13

## 2024-04-01 RX ADMIN — PROPOFOL 50 MG: 10 INJECTION, EMULSION INTRAVENOUS at 11:01

## 2024-04-01 RX ADMIN — LIDOCAINE HYDROCHLORIDE 2 ML: 10 INJECTION, SOLUTION INFILTRATION; PERINEURAL at 11:01

## 2024-04-01 RX ADMIN — ACETAMINOPHEN 500 MG: 500 TABLET ORAL at 10:02

## 2024-04-01 SDOH — HEALTH STABILITY: MENTAL HEALTH: CURRENT SMOKER: 0

## 2024-04-01 ASSESSMENT — PAIN SCALES - GENERAL
PAINLEVEL_OUTOF10: 4
PAINLEVEL_OUTOF10: 6
PAINLEVEL_OUTOF10: 0 - NO PAIN
PAIN_LEVEL: 0
PAINLEVEL_OUTOF10: 0 - NO PAIN

## 2024-04-01 ASSESSMENT — COGNITIVE AND FUNCTIONAL STATUS - GENERAL
STANDING UP FROM CHAIR USING ARMS: TOTAL
EATING MEALS: A LITTLE
PERSONAL GROOMING: A LITTLE
WALKING IN HOSPITAL ROOM: TOTAL
CLIMB 3 TO 5 STEPS WITH RAILING: TOTAL
MOVING FROM LYING ON BACK TO SITTING ON SIDE OF FLAT BED WITH BEDRAILS: A LOT
TURNING FROM BACK TO SIDE WHILE IN FLAT BAD: A LOT
DRESSING REGULAR UPPER BODY CLOTHING: A LITTLE
MOBILITY SCORE: 8
DRESSING REGULAR UPPER BODY CLOTHING: A LOT
PERSONAL GROOMING: A LITTLE
MOVING FROM LYING ON BACK TO SITTING ON SIDE OF FLAT BED WITH BEDRAILS: A LOT
STANDING UP FROM CHAIR USING ARMS: TOTAL
MOVING TO AND FROM BED TO CHAIR: TOTAL
DRESSING REGULAR LOWER BODY CLOTHING: TOTAL
MOBILITY SCORE: 8
TURNING FROM BACK TO SIDE WHILE IN FLAT BAD: A LOT
DAILY ACTIVITIY SCORE: 11
HELP NEEDED FOR BATHING: TOTAL
TOILETING: TOTAL
CLIMB 3 TO 5 STEPS WITH RAILING: TOTAL
WALKING IN HOSPITAL ROOM: TOTAL
MOVING TO AND FROM BED TO CHAIR: TOTAL
DAILY ACTIVITIY SCORE: 12
DRESSING REGULAR LOWER BODY CLOTHING: TOTAL
TOILETING: TOTAL
HELP NEEDED FOR BATHING: TOTAL
EATING MEALS: A LITTLE

## 2024-04-01 ASSESSMENT — PAIN - FUNCTIONAL ASSESSMENT
PAIN_FUNCTIONAL_ASSESSMENT: 0-10

## 2024-04-01 ASSESSMENT — ACTIVITIES OF DAILY LIVING (ADL): LACK_OF_TRANSPORTATION: NO

## 2024-04-01 ASSESSMENT — PAIN DESCRIPTION - ORIENTATION: ORIENTATION: RIGHT

## 2024-04-01 ASSESSMENT — PAIN DESCRIPTION - LOCATION: LOCATION: HIP

## 2024-04-01 NOTE — OP NOTE
Hip Fracture ORIF w/ Nail Trochanteric (R) Operative Note     Date: 3/30/2024 - 2024  OR Location: [facility]    Name: Estela Carnes, : 1940, Age: 83 y.o., MRN: [id number], Sex: female    Diagnosis  Pre-op Diagnosis     * Closed displaced intertrochanteric fracture of right femur, initial encounter (CMS/[facility]) [S72.141A] Post-op Diagnosis     * Closed displaced intertrochanteric fracture of right femur, initial encounter (CMS/[facility]) [S72.141A]     Procedures  Hip Fracture ORIF w/ Nail Trochanteric  40756 - TN TX INTER/TN/SUBTRCHNTRIC FEM FX IMED IMPLTSCREW      Surgeons      * Tim Alcala - Primary    Resident/Fellow/Other Assistant:  Surgeon(s) and Role:    Procedure Summary  Anesthesia: Consult  ASA: III  Anesthesia Staff: Anesthesiologist: Brennon Lozada MD  CRNA: JODY Cruz-CRNA  Estimated Blood Loss: 50 mL  Intra-op Medications:   Administrations occurring from 1045 to 1215 on 24:   Medication Name Total Dose   ceFAZolin in dextrose (iso-os) (Ancef) IVPB 2 g 2 g              Anesthesia Record               Intraprocedure I/O Totals          Intake    Tranexamic Acid 0.00 mL    The total shown is the total volume documented since Anesthesia Start was filed.    ceFAZolin in dextrose (iso-os) (Ancef) IVPB 2 g 100.00 mL    Total Intake 100 mL       Output    Urine 200 mL    Total Output 200 mL       Net    Net Volume -100 mL          Specimen: No specimens collected     Staff:   Circulator: Shahnaz Kumar RN  Relief Circulator: Almita Pelletier RN  Relief Scrub: Ashanti Bray  Scrub Person: Cristy Laurent         Drains and/or Catheters:   Urethral Catheter 16 Fr. (Active)   Site Assessment Clean;Skin intact 24   Collection Container Standard drainage bag 24   Securement Method Securing device (Describe) 24   Reason for Continuing Urinary Catheterization surgical procedures: urological/gynecological, pelvic oncology, anal, prolonged surgical  procedure 03/31/24 2142       [REMOVED] External Urinary Catheter Female (Removed)   Output (mL) 500 mL 03/29/24 0557       Tourniquet Times:         Implants:  Implants       Type Name Action Serial No.      Joint Hip NAIL, TFN ADV SHORT, 170MML, DIAMETER 10, 130 DEG - [id number] Implanted      Screw SCREW, TFNA, 95MM, STERILE - [id number] Implanted      Screw SCREW, LOCKING 5 X 34MM TI, STERILE - [id number] Implanted               Findings: Displaced right intertrochanteric hip fracture    Indications: Estela Carnes is an 83 y.o. female who is having surgery for Closed displaced intertrochanteric fracture of right femur, initial encounter (CMS/[facility]) [S72.141A].     The patient was seen in the preoperative area. The risks, benefits, complications, treatment options, non-operative alternatives, expected recovery and outcomes were discussed with the patient. The possibilities of reaction to medication, pulmonary aspiration, injury to surrounding structures, bleeding, recurrent infection, the need for additional procedures, failure to diagnose a condition, and creating a complication requiring transfusion or operation were discussed with the patient. The patient concurred with the proposed plan, giving informed consent.  The site of surgery was properly noted/marked if necessary per policy. The patient has been actively warmed in preoperative area. Preoperative antibiotics have been ordered and given within 1 hours of incision. Venous thrombosis prophylaxis are not indicated.    Procedure Details: Patient was seen in the preoperative holding area and the proper surgical site was identified and marked.  She was then brought to the operating room and placed supine on the fracture table.  After induction of satisfactory anesthesia and administration of prophylactic antibiotics a time-out was called.  We ensured the ability to obtain excellent reduction of this fracture prior to prep and drape using biplanar x-ray.  The  patient was then prepped and draped in a standard sterile fashion.    A short incision was created just proximal to the greater trochanter and taken down through the subcutaneous tissue and underlying fascia.  I identified the proper starting point for our guide pin at the tip of the greater trochanter then placed the guide pin to the appropriate depth.  I over reamed per protocol proximally and then inserted our 10 millimeter/170 millimeter nail to the appropriate depth.  I demonstrated excellent position of our nail and then secured the construct proximally with a 95 millimeter lag screw and distally with a single interlocking screw.  I again confirmed excellent position of our hardware and fracture alignment.  I then irrigated the wounds copiously with normal saline.  I closed the wounds in a standard layered fashion with 0 Vicryl followed by 2 Vicryl suture.  The skin was approximate with metal clips.  Sterile bandage was applied.  The patient will from anesthesia and transferred recovery room in stable condition.  Complications:  None; patient tolerated the procedure well.    Disposition: PACU - hemodynamically stable.  Condition: stable         Additional Details:    Attending Attestation:     Tim Alcala  Phone Number: [phone]

## 2024-04-01 NOTE — PROGRESS NOTES
04/01/24 1453   Current Planned Discharge Disposition   Current Planned Discharge Disposition SNF

## 2024-04-01 NOTE — CARE PLAN
The patient's goals for the shift include      The clinical goals for the shift include pain control, safety    Over the shift, the patient did not make progress toward the following goals. Barriers to progression include . Recommendations to address these barriers include .

## 2024-04-01 NOTE — ANESTHESIA PROCEDURE NOTES
Airway  Date/Time: 4/1/2024 11:02 AM  Urgency: elective    Airway not difficult    Staffing  Performed: CRNA   Authorized by: Brennon Lozada MD    Performed by: JODY Cruz-[place]  Patient location during procedure: OR    Indications and Patient Condition  Indications for airway management: anesthesia  Spontaneous ventilation: present  Sedation level: deep  Preoxygenated: yes  Patient position: sniffing  Mask difficulty assessment: 0 - not attempted  No planned trial extubation    Final Airway Details  Final airway type: supraglottic airway      Successful airway: classic  Size 4     Number of attempts at approach: 1

## 2024-04-01 NOTE — CARE PLAN
The patient's goals for the shift include      The clinical goals for the shift include pain control, safety

## 2024-04-01 NOTE — PROGRESS NOTES
Music Therapy Note    Estela OAKLEY Jesúsvamarissa was referred by     Therapy Session  Referral Type: New referral this admission  Visit Type: New visit  Session Start Time: 1353  Conflict of Service: Working with other staff  Number of family members present: 1  Family Participation: Interactive               Treatment/Interventions            Narrative  Assessment Detail: Pt was found sitting in bed working with staff. Family member expressed that pts IV came out and they were putting it back in.  Follow-up: Will follow up throughout admission    Education Documentation  No documentation found.

## 2024-04-01 NOTE — PROGRESS NOTES
TCC met with patient, spouse and son at the bedside. Introduction of self and explanation of discharge planning provided. Assessment complete. Pt lives with spouse at [facility]. Gave choices for SNF to spouse to review. Pt needs 3 midnights for medicare. TCC will follow.     DISCHARGE PLAN NOT SECURE       04/01/24 1449   Discharge Planning   Living Arrangements Spouse/significant other   Support Systems Spouse/significant other   Assistance Needed yes   Type of Residence Assisted living   Do you have animals or pets at home? No   Care Facility Name [facility]   Who is requesting discharge planning? Provider   Home or Post Acute Services Post acute facilities (Rehab/SNF/etc)   Type of Post Acute Facility Services Skilled nursing   Patient expects to be discharged to: SNF, gave choices to spouse   Does the patient need discharge transport arranged? Yes   RoundTrip coordination needed? Yes   Has discharge transport been arranged? No   Financial Resource Strain   How hard is it for you to pay for the very basics like food, housing, medical care, and heating? Not hard   Housing Stability   In the last 12 months, was there a time when you were not able to pay the mortgage or rent on time? N   In the last 12 months, how many places have you lived? 1   In the last 12 months, was there a time when you did not have a steady place to sleep or slept in a shelter (including now)? N   Transportation Needs   In the past 12 months, has lack of transportation kept you from medical appointments or from getting medications? no   In the past 12 months, has lack of transportation kept you from meetings, work, or from getting things needed for daily living? No   Patient Choice   Provider Choice list and CMS website (https://medicare.gov/care-compare#search) for post-acute Quality and Resource Measure Data were provided and reviewed with: Patient;Family   Patient / Family choosing to utilize agency / facility  established prior to hospitalization No

## 2024-04-01 NOTE — PROGRESS NOTES
Estela Carnes is a 83 y.o. female on day 2 of admission presenting with Closed displaced intertrochanteric fracture of right femur, initial encounter (CMS/[facility]).      Subjective   Right hip postop pain       Objective     Last Recorded Vitals  /55 (BP Location: Left arm, Patient Position: Lying)   Pulse 89   Temp 36.6 °C (97.9 °F) (Oral)   Resp 17   Wt 64 kg (141 lb 1.5 oz)   SpO2 92%   Intake/Output last 3 Shifts:    Intake/Output Summary (Last 24 hours) at 4/1/2024 1547  Last data filed at 4/1/2024 1500  Gross per 24 hour   Intake 1220 ml   Output 250 ml   Net 970 ml       Admission Weight  Weight: 64 kg (141 lb 1.5 oz) (03/30/24 1726)    Daily Weight  03/30/24 : 64 kg (141 lb 1.5 oz)    Image Results  FL less than 1 hour  Narrative: Interpreted By:  Ela Ruano,   STUDY:  FL LESS THAN 1 HOUR 4/1/2024 1:01 pm      INDICATION:  Signs/Symptoms:Right hip intertrochanteric fracture      COMPARISON:  None available.      ACCESSION NUMBER(S):  [id number]      ORDERING CLINICIAN:  KAYLA KNAPP      TECHNIQUE:  49.7 seconds of intraoperative fluoroscopic guidance was utilized  with 2 images obtained intraoperatively utilizing the C-arm.      Air kerma: 7.91 mGy      FINDINGS:  On these 2 images, there is placement of threaded nail extending from  the subtrochanteric region of the proximal femur into the femoral  head with rosy extending from greater trochanter into the femoral  shaft. Orthopedic hardware transfixes the comminuted  intertrochanteric fracture of right hip.      Impression: Intraoperative fluoroscopic guidance provided during open reduction  and internal fixation of the intertrochanteric fracture of right hip.      Signed by: Ela Ruano 4/1/2024 1:16 PM  Dictation workstation:   [id number]  ECG 12 lead  Normal sinus rhythm  Incomplete right bundle branch block  Septal infarct , age undetermined  ST & T wave abnormality, consider anterior ischemia  Abnormal ECG  Confirmed by Sahra Denise  ([id number]) on 4/1/2024 8:55:17 AM  ECG 12 lead  Sinus rhythm with Premature atrial complexes  Right bundle branch block  Abnormal ECG  Confirmed by Sahra Denise ([id number]) on 4/1/2024 8:54:05 AM      Physical Exam  General-awake, alert, oriented x3  Lung-clear to auscultation bilaterally  Heart-regular sinus rhythm and rhythm, S1 and S2 audible,no murmurs  Abdomen- soft, nontender, nondistended, good bowel sounds, no organomegaly, no mass, bowel sounds are present  Extremities-no clubbing, no cyanosis, no edema.  Right hip is slightly swollen, range of motion decreased  Wound-stop dressing is clear/dry/intact    Relevant Results               Assessment/Plan     Closed displaced intertrochanteric fracture of right femur, initial encounter (CMS/[facility])  Status post ORIF of right intertrochanteric fracture with nailing.  Continue pain control.  DVT prophylaxis with aspirin 325 mg p.o. twice daily.  PT/OT     Paroxysmal atrial fibrillation-will hold Eliquis for now and restart after surgery     Diabetes mellitus type 2-will continue  Accu-Cheks before meals and at bedtime with Humalog sliding scale coverage     UTI-according to UA and C&S from 3/29.  She is on cefazolin 2 g IV every 8 hours                              Froylan Collins MD

## 2024-04-01 NOTE — CONSULTS
Consults    Reason For Consult  Right hip fracture    History Of Present Illness  Estela Carnes is a 83 y.o. female presenting following an unwitnessed fall at her assisted living facility.  She experienced pain in the right hip and inability bear weight.  She was brought to the emergency room where x-rays confirmed a displaced intertrochanteric hip fracture.  She was admitted for pain control and discussion of definitive management.     Past Medical History  She has a past medical history of A-fib (CMS/[facility]), Adrenal nodule (CMS/[facility]), Closed fracture of metatarsal bone (2024), Diabetes mellitus, type 2 (CMS/[facility]), DJD (degenerative joint disease), Foot fracture, left (10/2020), H/O colonoscopy, H/O mammogram (2019), H/O sleep study (), History of stress test, HTN (hypertension), L5 vertebral fracture (CMS/[facility]), Macular degeneration, bilateral, Osteopenia, and Vertigo.    Surgical History  She has a past surgical history that includes CT guided imaging for needle placement (2018); CT guided imaging for needle placement (2019); MR angio head wo IV contrast (10/07/2021); MR angio neck wo IV contrast (10/07/2021);  section, classic; Cholecystectomy; Ovarian cyst removal; and biopsy (Right, 2019).     Social History  She reports that she has never smoked. She has never been exposed to tobacco smoke. She has never used smokeless tobacco. She reports that she does not use drugs. No history on file for alcohol use.    Family History  Family History   Problem Relation Name Age of Onset    Diabetes Mother      Heart disease Father          Allergies  Ciprofloxacin    Review of Systems     Physical Exam examination the right lower extremity finds to be in a shortened and externally rotated position.  She has intact motor and sensory function distally.     Last Recorded Vitals  Blood pressure 161/67, pulse 91, temperature 36.3 °C (97.3 °F), temperature source Tympanic, resp. rate 20,  "height 1.651 m (5' 5\"), weight 64 kg (141 lb 1.5 oz), SpO2 93 %.    Relevant Results      Scheduled medications  [MAR Hold] atorvastatin, 10 mg, oral, Daily  ceFAZolin, 2 g, intravenous, Once  [MAR Hold] cefTRIAXone, 1 g, intravenous, q24h  [MAR Hold] cyanocobalamin, 1,000 mcg, oral, Daily  [MAR Hold] enoxaparin, 40 mg, subcutaneous, Daily  [MAR Hold] hydrALAZINE, 25 mg, oral, BID  [MAR Hold] hydroxychloroquine, 200 mg, oral, BID  [MAR Hold] isosorbide mononitrate ER, 30 mg, oral, Daily  [MAR Hold] losartan, 100 mg, oral, Daily      Continuous medications     PRN medications  PRN medications: [MAR Hold] acetaminophen, [MAR Hold] guaiFENesin, [MAR Hold] magnesium hydroxide, [MAR Hold] morphine, [MAR Hold] traMADol  Results for orders placed or performed during the hospital encounter of 03/30/24 (from the past 24 hour(s))   POCT GLUCOSE   Result Value Ref Range    POCT Glucose 144 (H) 74 - 99 mg/dL        Assessment/Plan     I had a lengthy discussion with the patient and her family including her  who serves as power of  regarding my recommendation for intramedullary nail fixation of this fracture to optimize pain control, fracture healing and mobility.  We discussed the risk of surgery which include but not limited to infection, neurovascular, blood clot and pulm numbness, postoperative fracture nonunion, hardware failure and need for further surgery.  She elected to proceed.    Tim Alcala MD          "

## 2024-04-01 NOTE — ANESTHESIA POSTPROCEDURE EVALUATION
Patient: Estela Carnes    Procedure Summary       Date: 04/01/24 Room / Location: [facility] OR 08 / [facility]    Anesthesia Start: 1058 Anesthesia Stop: 1231    Procedure: Hip Fracture ORIF w/ Nail Trochanteric (Right: Hip) Diagnosis:       Closed displaced intertrochanteric fracture of right femur, initial encounter (CMS/[facility])      (Closed displaced intertrochanteric fracture of right femur, initial encounter (CMS/[facility]) [S72.141A])    Surgeons: Tim Alcala MD Responsible Provider: Brennon Lozada MD    Anesthesia Type: general ASA Status: 3            Anesthesia Type: general    Vitals Value Taken Time   /89 04/01/24 1229   Temp 36 °C (96.8 °F) 04/01/24 1229   Pulse 82 04/01/24 1231   Resp 16 04/01/24 1231   SpO2 100 % 04/01/24 1230   Vitals shown include unvalidated device data.    Anesthesia Post Evaluation    Patient location during evaluation: PACU  Patient participation: complete - patient participated  Level of consciousness: awake  Pain score: 0  Pain management: adequate  Multimodal analgesia pain management approach  Airway patency: patent  Two or more strategies used to mitigate risk of obstructive sleep apnea  Cardiovascular status: acceptable  Respiratory status: acceptable  Hydration status: acceptable  Postoperative Nausea and Vomiting: none  Comments: No Nausea    There were no known notable events for this encounter.

## 2024-04-01 NOTE — ANESTHESIA PREPROCEDURE EVALUATION
Patient: Estela Carnes    Procedure Information       Date/Time: 04/01/24 5925    Procedure: Hip Fracture ORIF w/ Nail Trochanteric (Right: Hip)    Location: [facility] / [facility]    Surgeons: Tim Alcala MD            Relevant Problems   Anesthesia (within normal limits)      Cardiac   (+) Chronic atrial fibrillation (CMS/HCC)   (+) Essential hypertension   (+) Heart murmur   (+) Hyperlipidemia   (+) Labile hypertension due to clinical environment   (+) Mixed hyperlipidemia   (+) Paroxysmal atrial fibrillation (CMS/HCC)      Pulmonary   (+) Chronic obstructive pulmonary disease (CMS/HCC)   (+) Obstructive sleep apnea syndrome      Neuro   (+) Anxiety      GI   (+) Gastroesophageal reflux disease without esophagitis      /Renal (within normal limits)      Liver (within normal limits)      Endocrine   (+) Subclinical hypothyroidism      Hematology   (+) Anemia   (+) Iron deficiency anemia secondary to inadequate dietary iron intake      Musculoskeletal   (+) Lumbar spondylosis   (+) Osteoarthritis of multiple joints   (+) Primary osteoarthritis of both knees   (+) Primary osteoarthritis of left knee   (+) Primary osteoarthritis of right knee      HEENT   (+) Sinusitis      ID (within normal limits)      Skin   (+) Intrinsic eczema      GYN (within normal limits)       Clinical information reviewed:   Tobacco  Allergies  Meds  Problems  Med Hx  Surg Hx   Fam Hx  Soc   Hx      Allergies   Allergen Reactions    Ciprofloxacin Confusion     Prior to Admission medications    Medication Sig Start Date End Date Taking? Authorizing Provider   acetaminophen (Tylenol) 325 mg tablet Take 2 tablets (650 mg) by mouth every 4 hours if needed for headaches, fever (temp greater than 38.0 C) or mild pain (1 - 3).    Historical Provider, MD   aspirin 81 mg EC tablet Take 1 tablet (81 mg) by mouth 1 time.    Historical Provider, MD   atorvastatin (Lipitor) 10 mg tablet Take 1 tablet (10 mg) by mouth once daily.  5/15/13   Historical Provider, MD   chlorhexidine (Peridex) 0.12 % solution Use 15 mL in the mouth or throat every 12 hours for 14 days. 3/25/24 4/8/24  MYAH Calloway   cholecalciferol (Vitamin D-3) 5,000 Units tablet Take 1 capsule by mouth once daily. 1/9/13   Historical Provider, MD   cyanocobalamin (Vitamin B-12) 1,000 mcg tablet Take 1 tablet (1,000 mcg) by mouth once daily.    Historical Provider, MD   guaiFENesin (Mucinex) 600 mg 12 hr tablet Take 1 tablet (600 mg) by mouth 2 times a day as needed for cough or congestion. Do not crush, chew, or split.    Historical Provider, MD   guaiFENesin (Robitussin) 100 mg/5 mL syrup Take 10 mL (200 mg) by mouth every 4 hours if needed for cough.    Historical Provider, MD   hydrALAZINE (Apresoline) 25 mg tablet Take 1 tablet (25 mg) by mouth 2 times a day.    Historical Provider, MD   isosorbide mononitrate ER (Imdur) 30 mg 24 hr tablet Take 1 tablet (30 mg) by mouth once daily. 5/20/22   Historical Provider, MD   losartan (Cozaar) 100 mg tablet Take 1 tablet (100 mg) by mouth once daily. 11/17/17   Historical Provider, MD   magnesium hydroxide (Milk of Magnesia) 400 mg/5 mL suspension Take 30 mL by mouth as needed at bedtime for constipation.    Historical Provider, MD   metFORMIN  mg 24 hr tablet Take 1 tablet (500 mg) by mouth 2 times a day with meals. 6/10/16   Historical Provider, MD   PlaqueniL 200 mg tablet Take 1 tablet (200 mg) by mouth 2 times a day. 1/10/22   Historical Provider, MD   traMADol (Ultram) 50 mg tablet Take 1 tablet (50 mg) by mouth every 8 hours if needed for severe pain (7 - 10) for up to 3 days. 3/30/24 4/2/24  MYHA Dumont   vit C/E/Zn/coppr/lutein/zeaxan (PRESERVISION AREDS-2 ORAL) Take 1 tablet by mouth once daily.    Historical Provider, MD   Eliquis 5 mg tablet Take by mouth 2 times a day. 3/6/19 3/31/24  Historical Provider, MD     Past Medical History:   Diagnosis Date    A-fib (CMS/HCC)     had  successful cardioversion    Adrenal nodule (CMS/HCC)     1.7 cm stable on CT , US  per Dr. Brooke nodule resolved    Closed fracture of metatarsal bone 2024    Diabetes mellitus, type 2 (CMS/HCC)     DJD (degenerative joint disease)     Foot fracture, left 10/2020    stepped off the curb    H/O colonoscopy     Dr Alejandre  int hemorhoids, diverticulosis, saw Dr Blue and was told no further colonoscopies needed    H/O mammogram 2019    WNL    H/O sleep study     negative    History of stress test      Dr. Mckee neg, heart monitor per Dr.Van Bill , stress test neg 2019    HTN (hypertension)     L5 vertebral fracture (CMS/HCC)     had vertebroplasty    Macular degeneration, bilateral     Osteopenia     DEXA , DEXA , 10/2016 , 2019,hip -2.2, per Dr Anu Thao, gets Prolia    Vertigo      Past Surgical History:   Procedure Laterality Date    BIOPSY Right 2019    Right lung nodule     SECTION, CLASSIC      CHOLECYSTECTOMY      CT GUIDED IMAGING FOR NEEDLE PLACEMENT  2018    CT GUIDED IMAGING FOR NEEDLE PLACEMENT [facility]    CT GUIDED IMAGING FOR NEEDLE PLACEMENT  2019    CT GUIDED IMAGING FOR NEEDLE PLACEMENT [facility]    MR HEAD ANGIO WO IV CONTRAST  10/07/2021    MR HEAD ANGIO WO IV CONTRAST [facility]    MR NECK ANGIO WO IV CONTRAST  10/07/2021    MR NECK ANGIO WO IV CONTRAST [facility]    OVARIAN CYST REMOVAL         NPO Detail:  No data recorded     Physical Exam    Airway  Mallampati: II     Cardiovascular    Dental    Pulmonary    Abdominal            Anesthesia Plan    History of general anesthesia?: yes  History of complications of general anesthesia?: no    ASA 3     general     The patient is not a current smoker.  Patient was not previously instructed to abstain from smoking on day of procedure.    intravenous induction   Anesthetic plan and risks discussed with patient.    Plan discussed with  CRNA and CAA.

## 2024-04-01 NOTE — ANESTHESIA PROCEDURE NOTES
Peripheral IV  Date/Time: 4/1/2024 11:00 AM  Inserted by: Brennon Lozada MD    Placement  Needle size: 22 G  Laterality: right  Location: hand  Site prep: alcohol  Technique: anatomical landmarks  Attempts: 1

## 2024-04-02 LAB
ANION GAP SERPL CALC-SCNC: 10 MMOL/L
BACTERIA BLD CULT: NORMAL
BACTERIA BLD CULT: NORMAL
BUN SERPL-MCNC: 24 MG/DL (ref 8–25)
CALCIUM SERPL-MCNC: 8.3 MG/DL (ref 8.5–10.4)
CHLORIDE SERPL-SCNC: 104 MMOL/L (ref 97–107)
CO2 SERPL-SCNC: 27 MMOL/L (ref 24–31)
CREAT SERPL-MCNC: 0.5 MG/DL (ref 0.4–1.6)
EGFRCR SERPLBLD CKD-EPI 2021: >90 ML/MIN/1.73M*2
ERYTHROCYTE [DISTWIDTH] IN BLOOD BY AUTOMATED COUNT: 13 % (ref 11.5–14.5)
GLUCOSE SERPL-MCNC: 196 MG/DL (ref 65–99)
HCT VFR BLD AUTO: 27.9 % (ref 36–46)
HGB BLD-MCNC: 8.7 G/DL (ref 12–16)
HOLD SPECIMEN: NORMAL
HOLD SPECIMEN: NORMAL
IRON SATN MFR SERPL: ABNORMAL %
IRON SERPL-MCNC: <20 UG/DL (ref 30–160)
MCH RBC QN AUTO: 26 PG (ref 26–34)
MCHC RBC AUTO-ENTMCNC: 31.2 G/DL (ref 32–36)
MCV RBC AUTO: 84 FL (ref 80–100)
NRBC BLD-RTO: 0 /100 WBCS (ref 0–0)
PLATELET # BLD AUTO: 216 X10*3/UL (ref 150–450)
POTASSIUM SERPL-SCNC: 3.4 MMOL/L (ref 3.4–5.1)
RBC # BLD AUTO: 3.34 X10*6/UL (ref 4–5.2)
SODIUM SERPL-SCNC: 141 MMOL/L (ref 133–145)
TIBC SERPL-MCNC: ABNORMAL UG/DL
UIBC SERPL-MCNC: 182 UG/DL (ref 110–370)
WBC # BLD AUTO: 9.7 X10*3/UL (ref 4.4–11.3)

## 2024-04-02 PROCEDURE — 80048 BASIC METABOLIC PNL TOTAL CA: CPT | Performed by: FAMILY MEDICINE

## 2024-04-02 PROCEDURE — 2500000004 HC RX 250 GENERAL PHARMACY W/ HCPCS (ALT 636 FOR OP/ED): Performed by: FAMILY MEDICINE

## 2024-04-02 PROCEDURE — 2500000001 HC RX 250 WO HCPCS SELF ADMINISTERED DRUGS (ALT 637 FOR MEDICARE OP): Performed by: ORTHOPAEDIC SURGERY

## 2024-04-02 PROCEDURE — 85027 COMPLETE CBC AUTOMATED: CPT | Performed by: FAMILY MEDICINE

## 2024-04-02 PROCEDURE — 99222 1ST HOSP IP/OBS MODERATE 55: CPT | Performed by: NURSE PRACTITIONER

## 2024-04-02 PROCEDURE — 97530 THERAPEUTIC ACTIVITIES: CPT | Mod: GO

## 2024-04-02 PROCEDURE — 97530 THERAPEUTIC ACTIVITIES: CPT | Mod: GP

## 2024-04-02 PROCEDURE — 2500000004 HC RX 250 GENERAL PHARMACY W/ HCPCS (ALT 636 FOR OP/ED): Mod: JZ | Performed by: ORTHOPAEDIC SURGERY

## 2024-04-02 PROCEDURE — 97162 PT EVAL MOD COMPLEX 30 MIN: CPT | Mod: GP

## 2024-04-02 PROCEDURE — 36415 COLL VENOUS BLD VENIPUNCTURE: CPT | Performed by: FAMILY MEDICINE

## 2024-04-02 PROCEDURE — 97166 OT EVAL MOD COMPLEX 45 MIN: CPT | Mod: GO

## 2024-04-02 PROCEDURE — 2500000001 HC RX 250 WO HCPCS SELF ADMINISTERED DRUGS (ALT 637 FOR MEDICARE OP): Performed by: FAMILY MEDICINE

## 2024-04-02 PROCEDURE — 83540 ASSAY OF IRON: CPT | Performed by: NURSE PRACTITIONER

## 2024-04-02 PROCEDURE — 2500000004 HC RX 250 GENERAL PHARMACY W/ HCPCS (ALT 636 FOR OP/ED): Performed by: NURSE PRACTITIONER

## 2024-04-02 PROCEDURE — 1210000001 HC SEMI-PRIVATE ROOM DAILY

## 2024-04-02 RX ORDER — HYDROXYCHLOROQUINE SULFATE 200 MG/1
200 TABLET, FILM COATED ORAL
Status: DISCONTINUED | OUTPATIENT
Start: 2024-04-03 | End: 2024-04-05 | Stop reason: HOSPADM

## 2024-04-02 RX ORDER — ASPIRIN 325 MG
325 TABLET, DELAYED RELEASE (ENTERIC COATED) ORAL DAILY
Status: DISCONTINUED | OUTPATIENT
Start: 2024-04-03 | End: 2024-04-04

## 2024-04-02 RX ORDER — CEFTRIAXONE 1 G/50ML
1 INJECTION, SOLUTION INTRAVENOUS EVERY 24 HOURS
Status: DISCONTINUED | OUTPATIENT
Start: 2024-04-02 | End: 2024-04-03

## 2024-04-02 RX ORDER — HYDRALAZINE HYDROCHLORIDE 25 MG/1
25 TABLET, FILM COATED ORAL 2 TIMES DAILY
Status: DISCONTINUED | OUTPATIENT
Start: 2024-04-02 | End: 2024-04-05

## 2024-04-02 RX ADMIN — ACETAMINOPHEN 650 MG: 325 TABLET ORAL at 12:33

## 2024-04-02 RX ADMIN — APIXABAN 2.5 MG: 2.5 TABLET, FILM COATED ORAL at 09:27

## 2024-04-02 RX ADMIN — HYDRALAZINE HYDROCHLORIDE 25 MG: 25 TABLET ORAL at 22:15

## 2024-04-02 RX ADMIN — HYDROCODONE BITARTRATE AND ACETAMINOPHEN 1 TABLET: 5; 325 TABLET ORAL at 09:32

## 2024-04-02 RX ADMIN — ACETAMINOPHEN 650 MG: 325 TABLET ORAL at 00:34

## 2024-04-02 RX ADMIN — IRON SUCROSE 200 MG: 20 INJECTION, SOLUTION INTRAVENOUS at 14:05

## 2024-04-02 RX ADMIN — ACETAMINOPHEN 650 MG: 325 TABLET ORAL at 18:48

## 2024-04-02 RX ADMIN — PSYLLIUM HUSK 1 PACKET: 3.4 POWDER ORAL at 09:28

## 2024-04-02 RX ADMIN — DOCUSATE SODIUM 100 MG: 100 CAPSULE, LIQUID FILLED ORAL at 09:26

## 2024-04-02 RX ADMIN — CEFAZOLIN SODIUM 2 G: 2 INJECTION, SOLUTION INTRAVENOUS at 05:05

## 2024-04-02 RX ADMIN — POLYETHYLENE GLYCOL 3350 17 G: 17 POWDER, FOR SOLUTION ORAL at 09:28

## 2024-04-02 RX ADMIN — CEFTRIAXONE SODIUM 1 G: 1 INJECTION, SOLUTION INTRAVENOUS at 09:28

## 2024-04-02 RX ADMIN — ACETAMINOPHEN 650 MG: 325 TABLET ORAL at 23:19

## 2024-04-02 RX ADMIN — APIXABAN 2.5 MG: 2.5 TABLET, FILM COATED ORAL at 21:59

## 2024-04-02 RX ADMIN — ACETAMINOPHEN 650 MG: 325 TABLET ORAL at 05:52

## 2024-04-02 ASSESSMENT — COGNITIVE AND FUNCTIONAL STATUS - GENERAL
DRESSING REGULAR UPPER BODY CLOTHING: A LOT
DRESSING REGULAR UPPER BODY CLOTHING: A LOT
WALKING IN HOSPITAL ROOM: A LOT
STANDING UP FROM CHAIR USING ARMS: A LOT
WALKING IN HOSPITAL ROOM: TOTAL
STANDING UP FROM CHAIR USING ARMS: A LOT
MOBILITY SCORE: 11
PERSONAL GROOMING: A LOT
TURNING FROM BACK TO SIDE WHILE IN FLAT BAD: A LOT
DAILY ACTIVITIY SCORE: 11
HELP NEEDED FOR BATHING: A LOT
EATING MEALS: A LITTLE
MOVING TO AND FROM BED TO CHAIR: A LOT
CLIMB 3 TO 5 STEPS WITH RAILING: TOTAL
DRESSING REGULAR UPPER BODY CLOTHING: A LOT
CLIMB 3 TO 5 STEPS WITH RAILING: TOTAL
EATING MEALS: A LITTLE
TOILETING: TOTAL
MOBILITY SCORE: 10
DRESSING REGULAR LOWER BODY CLOTHING: TOTAL
DRESSING REGULAR LOWER BODY CLOTHING: TOTAL
DAILY ACTIVITIY SCORE: 11
TURNING FROM BACK TO SIDE WHILE IN FLAT BAD: A LOT
DRESSING REGULAR LOWER BODY CLOTHING: A LOT
EATING MEALS: A LITTLE
HELP NEEDED FOR BATHING: A LOT
MOVING TO AND FROM BED TO CHAIR: A LOT
MOVING FROM LYING ON BACK TO SITTING ON SIDE OF FLAT BED WITH BEDRAILS: A LOT
PERSONAL GROOMING: A LOT
HELP NEEDED FOR BATHING: A LOT
TOILETING: TOTAL
PERSONAL GROOMING: A LOT
DAILY ACTIVITIY SCORE: 12
MOVING FROM LYING ON BACK TO SITTING ON SIDE OF FLAT BED WITH BEDRAILS: A LOT
TOILETING: TOTAL

## 2024-04-02 ASSESSMENT — PAIN SCALES - GENERAL
PAINLEVEL_OUTOF10: 0 - NO PAIN
PAINLEVEL_OUTOF10: 0 - NO PAIN
PAINLEVEL_OUTOF10: 5 - MODERATE PAIN
PAINLEVEL_OUTOF10: 6
PAINLEVEL_OUTOF10: 0 - NO PAIN

## 2024-04-02 ASSESSMENT — ENCOUNTER SYMPTOMS
FATIGUE: 0
COLOR CHANGE: 0
WEAKNESS: 1
COUGH: 0
DIZZINESS: 0
BRUISES/BLEEDS EASILY: 1
CONFUSION: 1
HEMATURIA: 0
SHORTNESS OF BREATH: 0
NAUSEA: 0
ABDOMINAL PAIN: 0
ACTIVITY CHANGE: 1
WOUND: 0
CHILLS: 0

## 2024-04-02 ASSESSMENT — PAIN - FUNCTIONAL ASSESSMENT
PAIN_FUNCTIONAL_ASSESSMENT: FLACC (FACE, LEGS, ACTIVITY, CRY, CONSOLABILITY)
PAIN_FUNCTIONAL_ASSESSMENT: 0-10
PAIN_FUNCTIONAL_ASSESSMENT: FLACC (FACE, LEGS, ACTIVITY, CRY, CONSOLABILITY)

## 2024-04-02 ASSESSMENT — ACTIVITIES OF DAILY LIVING (ADL)
ADL_ASSISTANCE: NEEDS ASSISTANCE
ADL_ASSISTANCE: NEEDS ASSISTANCE
BATHING_ASSISTANCE: MAXIMAL

## 2024-04-02 ASSESSMENT — PAIN SCALES - WONG BAKER: WONGBAKER_NUMERICALRESPONSE: HURTS LITTLE BIT

## 2024-04-02 NOTE — PROGRESS NOTES
"Estela Carnes is a 83 y.o. female on day 3 of admission presenting with Closed displaced intertrochanteric fracture of right femur, initial encounter (CMS/[facility]).    Subjective   comfortable       Objective     Physical Exam  Dressing clean, calves soft, neg  humberto's. NVI  Last Recorded Vitals  Blood pressure 165/69, pulse 70, temperature 37.2 °C (99 °F), temperature source Oral, resp. rate 16, height 1.651 m (5' 5\"), weight 64 kg (141 lb 1.5 oz), SpO2 97 %.  Intake/Output last 3 Shifts:  I/O last 3 completed shifts:  In: 2198 (34.3 mL/kg) [P.O.:200; I.V.:848 (13.3 mL/kg); IV Piggyback:1150]  Out: 700 (10.9 mL/kg) [Urine:650 (0.3 mL/kg/hr); Blood:50]  Weight: 64 kg     Relevant Results    Hg 8.7  Scheduled medications  acetaminophen, 650 mg, oral, q6h JEREMY  apixaban, 2.5 mg, oral, q12h JEREMY  [START ON 4/3/2024] aspirin, 325 mg, oral, Daily  [MAR Hold] atorvastatin, 10 mg, oral, Daily  cefTRIAXone, 1 g, intravenous, q24h  [MAR Hold] cyanocobalamin, 1,000 mcg, oral, Daily  docusate sodium, 100 mg, oral, BID  [MAR Hold] hydrALAZINE, 25 mg, oral, BID  [MAR Hold] hydroxychloroquine, 200 mg, oral, BID  iron sucrose, 200 mg, intravenous, Daily  [MAR Hold] isosorbide mononitrate ER, 30 mg, oral, Daily  [MAR Hold] losartan, 100 mg, oral, Daily  polyethylene glycol, 17 g, oral, Daily  psyllium, 1 packet, oral, Daily      Continuous medications  oxygen, 2 L/min, Last Rate: Stopped (04/01/24 1345)      PRN medications  PRN medications: [MAR Hold] acetaminophen, [MAR Hold] guaiFENesin, HYDROcodone-acetaminophen, HYDROmorphone, [MAR Hold] magnesium hydroxide, [MAR Hold] morphine, naloxone, naloxone, [MAR Hold] traMADol  Results for orders placed or performed during the hospital encounter of 03/30/24 (from the past 24 hour(s))   Basic Metabolic Panel   Result Value Ref Range    Glucose 196 (H) 65 - 99 mg/dL    Sodium 141 133 - 145 mmol/L    Potassium 3.4 3.4 - 5.1 mmol/L    Chloride 104 97 - 107 mmol/L    Bicarbonate 27 24 - 31 " mmol/L    Urea Nitrogen 24 8 - 25 mg/dL    Creatinine 0.50 0.40 - 1.60 mg/dL    eGFR >90 >60 mL/min/1.73m*2    Calcium 8.3 (L) 8.5 - 10.4 mg/dL    Anion Gap 10 <=19 mmol/L   Lavender Top   Result Value Ref Range    Extra Tube Hold for add-ons.    Iron and TIBC   Result Value Ref Range    Iron <20 (L) 30 - 160 ug/dL    UIBC 182 110 - 370 ug/dL    TIBC      % Saturation     CBC   Result Value Ref Range    WBC 9.7 4.4 - 11.3 x10*3/uL    nRBC 0.0 0.0 - 0.0 /100 WBCs    RBC 3.34 (L) 4.00 - 5.20 x10*6/uL    Hemoglobin 8.7 (L) 12.0 - 16.0 g/dL    Hematocrit 27.9 (L) 36.0 - 46.0 %    MCV 84 80 - 100 fL    MCH 26.0 26.0 - 34.0 pg    MCHC 31.2 (L) 32.0 - 36.0 g/dL    RDW 13.0 11.5 - 14.5 %    Platelets 216 150 - 450 x10*3/uL   Blood Bank Hold Tube   Result Value Ref Range    Extra Tube Hold for add-ons.                This patient has a urinary catheter   Reason for the urinary catheter remaining today? perioperative use for selected surgical procedures               Assessment/Plan   Principal Problem:    Closed displaced intertrochanteric fracture of right femur, initial encounter (CMS/[facility])  Active Problems:    Chronic obstructive pulmonary disease (CMS/[facility])    POD 1 s/p Troch nail rt hip.    PT, Pain control, placement.         I spent 10 minutes in the professional and overall care of this patient.      Ryan Berman MD

## 2024-04-02 NOTE — NURSING NOTE
Pt A&O x1 currently resting in bed. No complaints or concerns. Call light within reach.  Pt continues on IV fluids per order.  Bed alarm in place for pt safety.  Dsg dry and intact to rt hip.

## 2024-04-02 NOTE — CONSULTS
"Consults-blood management    Reason For Consult  anemia    History Of Present Illness  Estela Carnes is a 83 y.o. female with PMH of HTN, HLD, atrial fib-on Eliquis, DM, OA, anemia, FE deficient.  presenting to ED 3/30/24 with c/o right hip pain s/p unwitnessed fall at assisted living facility.  xrays show right femur intertrochanteric fracture. Right trochanteric nailing done 24. Pt alert, oriented X1, per son and , this is baseline, son reports hx dementia diagnosed \"few years ago\". Pt admit [facility] 3/29/24 with possible aspiration pneumonia, underwent bronch which show no evidence of aspiration, cxray and CT negative for pneumonia, antibiotics discontinued, pt discharged to [facility] assisted living with spouse.   Admit H/H 11.4/36, Post op H/H 8.7/27.9, post op iron panel show ERIN, FE  less than 20, noted mild chronic anemia/normocytic Sept , Hgb 11.8.      Past Medical History  HTN, HLD, paroxysmal atrial fib, heart murmur, CLARENCE, anxiety, GERD, subclinical hypothyroid, anemia, iron deficient anemia secondary to inadequate dietary iron intake, lumbar spondylosis, OA, ,       Surgical History  She has a past surgical history that includes CT guided imaging for needle placement (2018); CT guided imaging for needle placement (2019); MR angio head wo IV contrast (10/07/2021); MR angio neck wo IV contrast (10/07/2021);  section, classic; Cholecystectomy; Ovarian cyst removal; and biopsy (Right, 2019).     Social History  She reports that she has never smoked. She has never been exposed to tobacco smoke. She has never used smokeless tobacco. She reports that she does not use drugs. No history on file for alcohol use.    Family History  Family History   Problem Relation Name Age of Onset    Diabetes Mother      Heart disease Father          Allergies  Ciprofloxacin    Review of Systems   Constitutional:  Positive for activity change. Negative for chills and fatigue.   HENT:  " Negative for congestion and nosebleeds.    Eyes:  Negative for visual disturbance.   Respiratory:  Negative for cough and shortness of breath.    Cardiovascular:  Negative for chest pain and leg swelling.   Gastrointestinal:  Negative for abdominal pain and nausea.   Genitourinary:  Negative for hematuria.   Skin:  Negative for color change and wound.   Neurological:  Positive for weakness. Negative for dizziness.   Hematological:  Bruises/bleeds easily.   Psychiatric/Behavioral:  Positive for confusion.         Physical Exam  HENT:      Head: Normocephalic and atraumatic.      Right Ear: External ear normal.      Left Ear: External ear normal.      Nose: Nose normal.      Mouth/Throat:      Pharynx: Oropharynx is clear.   Eyes:      Conjunctiva/sclera: Conjunctivae normal.   Cardiovascular:      Rate and Rhythm: Rhythm irregular.      Heart sounds: Murmur heard.   Pulmonary:      Effort: Pulmonary effort is normal. No respiratory distress.      Breath sounds: Normal breath sounds. No wheezing, rhonchi or rales.   Chest:      Chest wall: No tenderness.   Abdominal:      General: Bowel sounds are normal. There is no distension.      Palpations: Abdomen is soft.      Tenderness: There is no abdominal tenderness.   Genitourinary:     Comments: Menard cath drain clear yellow urine  Musculoskeletal:         General: Tenderness present.      Cervical back: Normal range of motion and neck supple.      Right lower leg: No edema.      Left lower leg: No edema.   Skin:     General: Skin is warm and dry.      Capillary Refill: Capillary refill takes 2 to 3 seconds.      Coloration: Skin is not pale.      Findings: Bruising present. No erythema, lesion or rash.      Comments: Bruising bilat arms, hematoma left forarm, arm elevated on pillow, ice pack applied, dressing right hip dry/intact   Neurological:      General: No focal deficit present.      Mental Status: She is alert. Mental status is at baseline. She is disoriented.  "  Psychiatric:         Mood and Affect: Mood normal.         Behavior: Behavior normal.          Last Recorded Vitals  Blood pressure 150/52, pulse 73, temperature 37 °C (98.6 °F), temperature source Oral, resp. rate 17, height 1.651 m (5' 5\"), weight 64 kg (141 lb 1.5 oz), SpO2 96 %.    Relevant Results  Results for orders placed or performed during the hospital encounter of 03/30/24 (from the past 24 hour(s))   Basic Metabolic Panel   Result Value Ref Range    Glucose 196 (H) 65 - 99 mg/dL    Sodium 141 133 - 145 mmol/L    Potassium 3.4 3.4 - 5.1 mmol/L    Chloride 104 97 - 107 mmol/L    Bicarbonate 27 24 - 31 mmol/L    Urea Nitrogen 24 8 - 25 mg/dL    Creatinine 0.50 0.40 - 1.60 mg/dL    eGFR >90 >60 mL/min/1.73m*2    Calcium 8.3 (L) 8.5 - 10.4 mg/dL    Anion Gap 10 <=19 mmol/L   Lavender Top   Result Value Ref Range    Extra Tube Hold for add-ons.    Iron and TIBC   Result Value Ref Range    Iron <20 (L) 30 - 160 ug/dL    UIBC 182 110 - 370 ug/dL    TIBC      % Saturation     CBC   Result Value Ref Range    WBC 9.7 4.4 - 11.3 x10*3/uL    nRBC 0.0 0.0 - 0.0 /100 WBCs    RBC 3.34 (L) 4.00 - 5.20 x10*6/uL    Hemoglobin 8.7 (L) 12.0 - 16.0 g/dL    Hematocrit 27.9 (L) 36.0 - 46.0 %    MCV 84 80 - 100 fL    MCH 26.0 26.0 - 34.0 pg    MCHC 31.2 (L) 32.0 - 36.0 g/dL    RDW 13.0 11.5 - 14.5 %    Platelets 216 150 - 450 x10*3/uL   Blood Bank Hold Tube   Result Value Ref Range    Extra Tube Hold for add-ons.       Current Facility-Administered Medications:     [MAR Hold] acetaminophen (Tylenol) tablet 650 mg, 650 mg, oral, q4h PRN, Froylan Collins MD, 650 mg at 03/31/24 2126    acetaminophen (Tylenol) tablet 650 mg, 650 mg, oral, q6h [facility], Tim Alcala MD, 650 mg at 04/02/24 1233    apixaban (Eliquis) tablet 2.5 mg, 2.5 mg, oral, q12h JEREMY, Froylan Collins MD, 2.5 mg at 04/02/24 0927    [START ON 4/3/2024] aspirin EC tablet 325 mg, 325 mg, oral, Daily, Froylan Collins MD    [MAR Hold] atorvastatin (Lipitor) " tablet 10 mg, 10 mg, oral, Daily, Froylan Collins MD, 10 mg at 03/31/24 1054    cefTRIAXone (Rocephin) IVPB 1 g, 1 g, intravenous, q24h, Froylan Collins MD, Stopped at 04/02/24 0958    [MAR Hold] cyanocobalamin (Vitamin B-12) tablet 1,000 mcg, 1,000 mcg, oral, Daily, Froylan Collisn MD, 1,000 mcg at 03/31/24 1055    docusate sodium (Colace) capsule 100 mg, 100 mg, oral, BID, Tim Alcala MD, 100 mg at 04/02/24 0926    [MAR Hold] guaiFENesin (Mucinex) 12 hr tablet 600 mg, 600 mg, oral, BID PRN, Froylan Collins MD    [MAR Hold] hydrALAZINE (Apresoline) tablet 25 mg, 25 mg, oral, BID, Froylan Collins MD, 25 mg at 03/31/24 2124    HYDROcodone-acetaminophen (Norco) 5-325 mg per tablet 1 tablet, 1 tablet, oral, q4h PRN, Tim Alcala MD, 1 tablet at 04/02/24 0932    HYDROmorphone (Dilaudid) injection 0.5 mg, 0.5 mg, intravenous, q4h PRN, Tim Alcala MD    [MAR Hold] hydroxychloroquine (Plaquenil) tablet 200 mg, 200 mg, oral, BID, Froylan Collins MD, 200 mg at 03/31/24 2124    iron sucrose (Venofer) injection 200 mg, 200 mg, intravenous, Daily, Arely Lester, APRN-CNP, 200 mg at 04/02/24 1405    [MAR Hold] isosorbide mononitrate ER (Imdur) 24 hr tablet 30 mg, 30 mg, oral, Daily, Froylan Collins MD, 30 mg at 03/31/24 1052    [MAR Hold] losartan (Cozaar) tablet 100 mg, 100 mg, oral, Daily, Froylan Collins MD, 100 mg at 03/31/24 1054    [MAR Hold] magnesium hydroxide (Milk of Magnesia) 400 mg/5 mL suspension 30 mL, 30 mL, oral, Nightly PRN, Froylan Collins MD    [MAR Hold] morphine injection 2 mg, 2 mg, intravenous, q4h PRN, Froylan Collins MD    naloxone (Narcan) injection 0.2 mg, 0.2 mg, intravenous, q5 min PRN, Tim Alcala MD    naloxone (Narcan) injection 0.2 mg, 0.2 mg, intravenous, q5 min PRN, Tim Alcala MD    oxygen (O2) therapy, 2 L/min, inhalation, Continuous, Tim Alcala MD, Stopped at 04/01/24 1345    polyethylene glycol (Glycolax, Miralax)  packet 17 g, 17 g, oral, Daily, Tim Alcala MD, 17 g at 04/02/24 0928    psyllium (Metamucil) 3.4 gram packet 1 packet, 1 packet, oral, Daily, Tim Alcala MD, 1 packet at 04/02/24 0928    [MAR Hold] traMADol (Ultram) tablet 50 mg, 50 mg, oral, q8h PRN, Froylan Collins MD, 50 mg at 04/01/24 0612      Assessment/Plan   Right intertrochanteric fracture  S/p right trochanteric nail  HTN  DM  dementia  Chronic mild normocytic anemia  Post op iron deficient anemia combined with anemia of acute blood loss and chronic disease anemia,   Venofer 200mg IV X3  Monitor H/H

## 2024-04-02 NOTE — PROGRESS NOTES
Estela Carnes is a 83 y.o. female on day 3 of admission presenting with Closed displaced intertrochanteric fracture of right femur, initial encounter (CMS/[facility]).      Subjective   Complaining of right hip postop pain, participating in PT       Objective     Last Recorded Vitals  /69 (BP Location: Left arm, Patient Position: Lying)   Pulse 76   Temp 36.7 °C (98.1 °F) (Oral)   Resp 18   Wt 64 kg (141 lb 1.5 oz)   SpO2 96%   Intake/Output last 3 Shifts:    Intake/Output Summary (Last 24 hours) at 4/2/2024 0858  Last data filed at 4/2/2024 0805  Gross per 24 hour   Intake 2198 ml   Output 1100 ml   Net 1098 ml       Admission Weight  Weight: 64 kg (141 lb 1.5 oz) (03/30/24 1726)    Daily Weight  03/30/24 : 64 kg (141 lb 1.5 oz)    Image Results  FL hip during operative procedure  Narrative: Interpreted By:  Ela Ruano,   STUDY:  FL LESS THAN 1 HOUR 4/1/2024 1:01 pm      INDICATION:  Signs/Symptoms:Right hip intertrochanteric fracture      COMPARISON:  None available.      ACCESSION NUMBER(S):  [id number]      ORDERING CLINICIAN:  KAYLA KNAPP      TECHNIQUE:  49.7 seconds of intraoperative fluoroscopic guidance was utilized  with 2 images obtained intraoperatively utilizing the C-arm.      Air kerma: 7.91 mGy      FINDINGS:  On these 2 images, there is placement of threaded nail extending from  the subtrochanteric region of the proximal femur into the femoral  head with rosy extending from greater trochanter into the femoral  shaft. Orthopedic hardware transfixes the comminuted  intertrochanteric fracture of right hip.      Impression: Intraoperative fluoroscopic guidance provided during open reduction  and internal fixation of the intertrochanteric fracture of right hip.      Signed by: Ela Ruano 4/1/2024 1:16 PM  Dictation workstation:   [id number]      Physical Exam  General-awake, alert, oriented x3  Lung-clear to auscultation bilaterally  Heart-regular sinus rhythm and rhythm, S1 and S2  audible,no murmurs  Abdomen- soft, nontender, nondistended, good bowel sounds, no organomegaly, no mass, bowel sounds are present  Extremities-no clubbing, no cyanosis, no edema.  Right hip is slightly swollen, not deformed, tender to palpation over postop dressing  Wound-postop dressing clean/dry/intact  Relevant Results               Assessment/Plan    Closed displaced intertrochanteric fracture of right femur, initial encounter (CMS/[facility])  Status post ORIF of right intertrochanteric fracture with nailing.  Postop day 1 ,continue pain control.  DVT prophylaxis with aspirin 325 mg p.o. twice daily.  PT/OT.  Will need a skilled nursing facility on discharge     Paroxysmal atrial fibrillation-will restart Eliquis 5 mg p.o. twice daily     Diabetes mellitus type 2-will continue  Accu-Cheks before meals and at bedtime with Humalog sliding scale coverage     UTI-according to UA and C&S from 3/29.  She is on cefazolin 2 g IV every 8 hours.  Continue for now                              Froylan Collins MD

## 2024-04-02 NOTE — CARE PLAN
The patient's goals for the shift include  rest    The clinical goals for the shift include pain control

## 2024-04-02 NOTE — PROGRESS NOTES
Physical Therapy    Physical Therapy Evaluation & Treatment    Patient Name: Estela Carnes  MRN: [id number]  Today's Date: 4/2/2024   Time Calculation  Start Time: 0840  Stop Time: 0905  Time Calculation (min): 25 min    Assessment/Plan   PT Assessment  PT Assessment Results: Decreased strength, Decreased range of motion, Decreased endurance, Impaired balance, Decreased mobility, Decreased coordination, Decreased cognition, Pain  Rehab Prognosis: Good  Evaluation/Treatment Tolerance: Patient limited by pain, Patient tolerated treatment well  End of Session Communication: Bedside nurse  Assessment Comment: pt would benefit from skilled therapy services.  End of Session Patient Position: Bed, 4 rail up, Alarm on (call button in reach)  IP OR SWING BED PT PLAN  Inpatient or Swing Bed: Inpatient  PT Plan  Treatment/Interventions: Bed mobility, Transfer training, Gait training, Balance training, Strengthening, Range of motion, Therapeutic exercise, Endurance training  PT Plan: Skilled PT  PT Frequency: 6 times per week  PT Discharge Recommendations: Moderate intensity level of continued care  Equipment Recommended upon Discharge: Wheeled walker  PT Recommended Transfer Status:  (MOD/MAX A x 2)  PT - OK to Discharge: Yes      Subjective   General Visit Information:  General  Reason for Referral: pt sustained a fall resulting in R hip fx; s/p ORIF 4/1/24 by Dr. Alcala; impaired cognition, ADL's/mobility  Past Medical History Relevant to Rehab: dementia, anemia, anxiety,  vertigo, HLD, GERD, DM, paroxysmal a-fib, mac degen, [place], L5 fx, RA  Prior to Session Communication: Bedside nurse  Patient Position Received: Bed, 3 rail up, Alarm on  General Comment: pt pleasantly confused and able to follow commands well    Home Living:  Home Living  Type of Home: Assisted living  Lives With:  (spouse)  Home Adaptive Equipment:  (Rolling walker and w/c)  Home Layout: One level  Bathroom Shower/Tub: Walk-in shower  Bathroom  Equipment: Grab bars in shower, Built-in shower seat  Home Living Comments: pt came from [facility]; **pt is a poor historian    Prior Level of Function:  Prior Function Per Pt/Caregiver Report  Level of [place]oard: Needs assistance with ADLs, Needs assistance with homemaking, Needs assistance with functional transfers  Receives Help From:  (spouse and care staff)  ADL Assistance: Needs assistance  Ambulatory Assistance: Independent (using rolling walker)  Prior Function Comments: Pt is a poor historian.  Precautions:    Precautions  Hearing/Visual Limitations: wears glasses; mild [place]  LE Weight Bearing Status:  (25% WB - R LE)  Medical Precautions: Fall precautions  Precautions Comment: reviewed WB status      Objective   Pain:  Pain Assessment  Pain Assessment: FLACC (Face, Legs, Activity, Cry, Consolability) (FLA)  Pain Score:  (6/10 R hip during transfers; RN to medicate)    Cognition:  Cognition  Overall Cognitive Status: Impaired at baseline  Orientation Level: Disoriented to place, Disoriented to time, Disoriented to situation  Safety Judgment: Decreased awareness of need for safety precautions    General Assessments:       Activity Tolerance  Endurance:  (mild fatigue with activities)    Sensation  Sensation Comment: denies any numbness/tingling      Coordination  Coordination Comment: difficulty advancing B LE    Postural Control  Posture Comment: flexed posture, rounded shoulders    Static Sitting Balance  Static Sitting-Balance Support:  (GOOD-)  Dynamic Sitting Balance  Dynamic Sitting-Balance Support:  (FAIR+)    Static Standing Balance  Static Standing-Balance Support:  (POOR+)    Functional Assessments:    Therapeutic Exercises: instructed pt in R LE: Ankle pumps and heel slides x 8-10 reps      Bed Mobility:  supine <-> sit with MAX A x 2 for trunk up, scooting and B LE. pt moaned due to increased R hip pain;    Transfer:  sit <-> stand with MOD A x 2. pt pulling up on braced RW; pt  able to partially stand with mild posterior trunk lean; pt had difficulty maintaining 25% WB on R LE in standing. pt c.o increased R hip pain;       Extremity/Trunk Assessments:  RUE   RUE : Within Functional Limits  LUE   LUE: Within Functional Limits  RLE   RLE :  (N/T; R hip bandage intact)  LLE   LLE :  (WFL with 3+/5 strength)  Outcome Measures:       Encounter Problems       Encounter Problems (Active)       Mobility       STG - Patient will ambulate 60' x 1 using rolling walker with CGA (Progressing)       Start:  04/02/24    Expected End:  04/16/24               PT Transfers       STG - Patient to transfer to and from sit to supine with CGA (Progressing)       Start:  04/02/24    Expected End:  04/16/24            STG - Patient will transfer sit to and from stand with CGA (Progressing)       Start:  04/02/24    Expected End:  04/16/24               Pain - Adult              Education Documentation  Precautions, taught by Long Kwok, PT at 4/2/2024  2:10 PM.  Learner: Patient  Readiness: Eager  Method: Explanation  Response: Verbalizes Understanding    Mobility Training, taught by Long Kwok, PT at 4/2/2024  2:10 PM.  Learner: Patient  Readiness: Eager  Method: Explanation  Response: Verbalizes Understanding

## 2024-04-02 NOTE — NURSING NOTE
End of shift order review completed. Call light within reach.  Bed alarm in place for pt safety.  Dsg dry and intact to rt hip.

## 2024-04-02 NOTE — PROGRESS NOTES
[facility]. 1. [facility] 2. [facility] 3. [facility]  No precert needed.   PATIENT DOES NOT HAVE A SECURE DISCHARGE.     04/02/24 1314   Discharge Planning   Home or Post Acute Services Post acute facilities (Rehab/SNF/etc)   Type of Post Acute Facility Services Skilled nursing;Rehab   Patient expects to be discharged to: sent referral 1. [facility] 2. [facility] 3. [facility]   Does the patient need discharge transport arranged? Yes   [employer]rip coordination needed? Yes

## 2024-04-02 NOTE — PROGRESS NOTES
Occupational Therapy    Evaluation/Treatment    Patient Name: Estela Carnes  MRN: [id number]  : 1940  Today's Date: 24  Time Calculation  Start Time: 805  Stop Time: 825  Time Calculation (min): 20 min       Assessment:  OT Assessment: Pt presents on eval with increased overall weakness, cognitive deficits noted, decreased activity tolerance, and impaired overall balance affecting her self-care and functional transfers/mobility. Pt will benefit from continued skilled OT to address these deficits.  Prognosis: Fair  Evaluation/Treatment Tolerance: Patient limited by pain, Patient limited by fatigue  End of Session Communication: Bedside nurse  End of Session Patient Position: Bed, 3 rail up, Alarm on (Needs in reach.)  OT Assessment Results: Decreased ADL status, Decreased upper extremity strength, Decreased safe judgment during ADL, Decreased cognition, Decreased endurance, Visual deficit, Decreased fine motor control, Decreased functional mobility, Decreased gross motor control, Decreased trunk control for functional activities    Plan:  Treatment Interventions: ADL retraining, Functional transfer training, UE strengthening/ROM, Endurance training, Patient/family training, Equipment evaluation/education, Neuromuscular reeducation, Compensatory technique education, Cognitive reorientation  OT Frequency: 5 times per week  OT Discharge Recommendations: Moderate intensity level of continued care  Equipment Recommended upon Discharge: Wheeled walker  OT Recommended Transfer Status: Maximum assist, Assist of 2  OT - OK to Discharge: Yes      Subjective     General:   OT Received On: 24  General  Reason for Referral: R hip fx s/p ORIF, cognitive deficits, impaired ADL's/mobility  Referred By: Tim Alcala MD  Past Medical History Relevant to Rehab: dementia, anemia, anxiety,  vertigo, HLD, GERD, DM, paroxysmal a-fib, mac degen, [place], L5 fx, RA  Family/Caregiver Present: No  Prior to Session  Communication: Bedside nurse  Patient Position Received: Bed, 3 rail up, Alarm on  General Comment: Pt is an 84 yo female admitted from [facility] to the hospital after a fall resulting in a R hip fx. Pt is s/p ORIF with troch nail by Dr. Alcala.    Precautions:  Hearing/Visual Limitations: low vision with glasses, [place]  LE Weight Bearing Status: Other (Comment) (PWB 25% RLE)  Medical Precautions: Fall precautions    Pain:  Pain Assessment  Pain Assessment: FLACC (Face, Legs, Activity, Cry, Consolability)  Pain Score: 6  Pain Type: Surgical pain, Acute pain  Pain Location: Hip  Pain Orientation: Right    Objective     Cognition:  Overall Cognitive Status: Impaired at baseline, Impaired  Orientation Level: Disoriented to situation, Disoriented to time, Disoriented to place  Safety Judgment: Decreased awareness of need for safety precautions  Cognition Comments: Pt presents pleasantly confused throughout eval.  Insight: Severe    Home Living:  Type of Home: Assisted living ([facility])  Lives With: Other (Comment) (care staff)  Home Living Comments: Pt is a poor historian.    Prior Function:  Level of [place]: Needs assistance with ADLs, Needs assistance with homemaking, Needs assistance with functional transfers  Receives Help From: Other (Comment) (care staff)  ADL Assistance: Needs assistance  Homemaking Assistance: Needs assistance  Ambulatory Assistance: Independent (mod indep using RW)  Vocational: Retired  Hand Dominance: Right  Prior Function Comments: Pt is a poor historian.    ADL:  Eating Assistance: Stand by  Eating Deficit: Supervision/safety, Verbal cueing, Setup, Increased time to complete  Grooming Assistance: Maximal  Bathing Assistance: Maximal  UE Dressing Assistance: Maximal  LE Dressing Assistance: Total  Toileting Assistance with Device: Total  Toileting Deficit: Urinary Catheter    Activity Tolerance:  Activity Tolerance Comments: impaired due to R hip pain and increased overall  weakness    Bed Mobility/Transfers: Bed Mobility  Bed Mobility:  (Pt completed supine<>sit in bed with max A x2 required due to cognitive deficits noted, increased overall weakness, and R hip pain.)    Transfers  Transfer:  (Pt completed sit<>stand for 2 trials with max A x2 required due to poor balance, increased overall weakness, and R hip pain.)    Functional Mobility:  Functional Mobility  Functional Mobility Performed: No    Sitting Balance:  Static Sitting Balance  Static Sitting-Balance Support: Bilateral upper extremity supported, Feet supported  Static Sitting-Level of Assistance: Minimum assistance, Contact guard  Static Sitting-Comment/Number of Minutes: 4-5 minutes sitting EOB    Standing Balance:  Static Standing Balance  Static Standing-Balance Support: Bilateral upper extremity supported  Static Standing-Level of Assistance: Maximum assistance (x2)  Static Standing-Comment/Number of Minutes: <1 minute for 2 trials with walker support    Therapy/Activity: Therapeutic Activity  Therapeutic Activity Performed:  (See bed mobility, transfers, static sitting balance, and static standing balance.)    Vision:Vision - Basic Assessment  Current Vision: Wears glasses all the time  Visual History: Macular degeneration    Sensation:  Sensation Comment: unable to assess d/t cognition    Strength:  Strength Comments: JENISE's 3-/5 shoulders, 3+/5 distally    Coordination:  Coordination Comment: JENISE's impaired     Hand Function:  Hand Function  Gross Grasp: Functional  Coordination: Impaired      Outcome Measures: [facility] Daily Activity  Putting on and taking off regular lower body clothing: Total  Bathing (including washing, rinsing, drying): A lot  Putting on and taking off regular upper body clothing: A lot  Toileting, which includes using toilet, bedpan or urinal: Total  Taking care of personal grooming such as brushing teeth: A lot  Eating Meals: A little  Daily Activity - Total Score: 11      Education  Documentation  Precautions, taught by Orville Calderon Jr., OT at 4/2/2024  9:50 AM.  Learner: Patient  Readiness: Acceptance  Method: Explanation  Response: Needs Reinforcement    Home Exercise Program, taught by Orville Calderon Jr., OT at 4/2/2024  9:50 AM.  Learner: Patient  Readiness: Acceptance  Method: Explanation  Response: Needs Reinforcement    ADL Training, taught by Orville Calderon Jr., OT at 4/2/2024  9:50 AM.  Learner: Patient  Readiness: Acceptance  Method: Explanation  Response: Needs Reinforcement    Education Comments  No comments found.         Goals:  Encounter Problems       Encounter Problems (Active)       OT Goals       Pt will complete all functional transfers and mobility with mod A using a RW while maintaining 25% WB RLE. (Progressing)       Start:  04/02/24    Expected End:  04/30/24            Pt will complete all grooming tasks with min A. (Progressing)       Start:  04/02/24    Expected End:  04/30/24            Pt will complete UB dressing/bathing with min A. (Progressing)       Start:  04/02/24    Expected End:  04/30/24            Pt will complete all toileting tasks with mod A. (Progressing)       Start:  04/02/24    Expected End:  04/30/24            Pt will participate in BUE exercises in order to enhance ADL's and functional transfers. (Progressing)       Start:  04/02/24    Expected End:  04/30/24

## 2024-04-03 ENCOUNTER — APPOINTMENT (OUTPATIENT)
Dept: CARDIOLOGY | Facility: HOSPITAL | Age: 84
DRG: 481 | End: 2024-04-03
Payer: MEDICARE

## 2024-04-03 ENCOUNTER — APPOINTMENT (OUTPATIENT)
Dept: RADIOLOGY | Facility: HOSPITAL | Age: 84
DRG: 481 | End: 2024-04-03
Payer: MEDICARE

## 2024-04-03 LAB
ANION GAP BLDA CALCULATED.4IONS-SCNC: 10 MMO/L (ref 10–25)
ANION GAP SERPL CALC-SCNC: 11 MMOL/L
ARTERIAL PATENCY WRIST A: POSITIVE
BASE EXCESS BLDA CALC-SCNC: 2 MMOL/L (ref -2–3)
BODY TEMPERATURE: 37 DEGREES CELSIUS
BUN SERPL-MCNC: 12 MG/DL (ref 8–25)
CA-I BLDA-SCNC: 1.13 MMOL/L (ref 1.1–1.33)
CALCIUM SERPL-MCNC: 8.5 MG/DL (ref 8.5–10.4)
CHLORIDE BLDA-SCNC: 104 MMOL/L (ref 98–107)
CHLORIDE SERPL-SCNC: 103 MMOL/L (ref 97–107)
CO2 SERPL-SCNC: 27 MMOL/L (ref 24–31)
CREAT SERPL-MCNC: 0.4 MG/DL (ref 0.4–1.6)
EGFRCR SERPLBLD CKD-EPI 2021: >90 ML/MIN/1.73M*2
ERYTHROCYTE [DISTWIDTH] IN BLOOD BY AUTOMATED COUNT: 12.8 % (ref 11.5–14.5)
GLUCOSE BLD MANUAL STRIP-MCNC: 130 MG/DL (ref 74–99)
GLUCOSE BLD MANUAL STRIP-MCNC: 201 MG/DL (ref 74–99)
GLUCOSE BLD MANUAL STRIP-MCNC: 238 MG/DL (ref 74–99)
GLUCOSE BLD MANUAL STRIP-MCNC: 245 MG/DL (ref 74–99)
GLUCOSE BLDA-MCNC: 274 MG/DL (ref 74–99)
GLUCOSE SERPL-MCNC: 193 MG/DL (ref 65–99)
HCO3 BLDA-SCNC: 25.1 MMOL/L (ref 22–26)
HCT VFR BLD AUTO: 29.5 % (ref 36–46)
HCT VFR BLD EST: 30 % (ref 36–46)
HGB BLD-MCNC: 9.4 G/DL (ref 12–16)
HGB BLDA-MCNC: 10.1 G/DL (ref 12–16)
INHALED O2 CONCENTRATION: 28 %
LACTATE BLDA-SCNC: 1.7 MMOL/L (ref 0.4–2)
MCH RBC QN AUTO: 26 PG (ref 26–34)
MCHC RBC AUTO-ENTMCNC: 31.9 G/DL (ref 32–36)
MCV RBC AUTO: 82 FL (ref 80–100)
NRBC BLD-RTO: 0 /100 WBCS (ref 0–0)
OXYHGB MFR BLDA: 97 % (ref 94–98)
PCO2 BLDA: 33 MM HG (ref 38–42)
PH BLDA: 7.49 PH (ref 7.38–7.42)
PLATELET # BLD AUTO: 237 X10*3/UL (ref 150–450)
PO2 BLDA: 113 MM HG (ref 85–95)
POTASSIUM BLDA-SCNC: 3.5 MMOL/L (ref 3.5–5.3)
POTASSIUM SERPL-SCNC: 3.4 MMOL/L (ref 3.4–5.1)
RBC # BLD AUTO: 3.62 X10*6/UL (ref 4–5.2)
SAO2 % BLDA: 98 % (ref 94–100)
SODIUM BLDA-SCNC: 136 MMOL/L (ref 136–145)
SODIUM SERPL-SCNC: 141 MMOL/L (ref 133–145)
SPECIMEN DRAWN FROM PATIENT: ABNORMAL
WBC # BLD AUTO: 10.2 X10*3/UL (ref 4.4–11.3)

## 2024-04-03 PROCEDURE — 2500000001 HC RX 250 WO HCPCS SELF ADMINISTERED DRUGS (ALT 637 FOR MEDICARE OP): Performed by: ORTHOPAEDIC SURGERY

## 2024-04-03 PROCEDURE — 70450 CT HEAD/BRAIN W/O DYE: CPT | Performed by: RADIOLOGY

## 2024-04-03 PROCEDURE — 2500000004 HC RX 250 GENERAL PHARMACY W/ HCPCS (ALT 636 FOR OP/ED): Performed by: INTERNAL MEDICINE

## 2024-04-03 PROCEDURE — 2500000004 HC RX 250 GENERAL PHARMACY W/ HCPCS (ALT 636 FOR OP/ED): Performed by: NURSE PRACTITIONER

## 2024-04-03 PROCEDURE — 1200000002 HC GENERAL ROOM WITH TELEMETRY DAILY

## 2024-04-03 PROCEDURE — 84132 ASSAY OF SERUM POTASSIUM: CPT | Performed by: INTERNAL MEDICINE

## 2024-04-03 PROCEDURE — 93005 ELECTROCARDIOGRAM TRACING: CPT

## 2024-04-03 PROCEDURE — 97535 SELF CARE MNGMENT TRAINING: CPT | Mod: GO,CO

## 2024-04-03 PROCEDURE — 70450 CT HEAD/BRAIN W/O DYE: CPT

## 2024-04-03 PROCEDURE — 2500000004 HC RX 250 GENERAL PHARMACY W/ HCPCS (ALT 636 FOR OP/ED): Performed by: ORTHOPAEDIC SURGERY

## 2024-04-03 PROCEDURE — 2500000002 HC RX 250 W HCPCS SELF ADMINISTERED DRUGS (ALT 637 FOR MEDICARE OP, ALT 636 FOR OP/ED): Performed by: FAMILY MEDICINE

## 2024-04-03 PROCEDURE — 80048 BASIC METABOLIC PNL TOTAL CA: CPT | Performed by: FAMILY MEDICINE

## 2024-04-03 PROCEDURE — 97530 THERAPEUTIC ACTIVITIES: CPT | Mod: GP

## 2024-04-03 PROCEDURE — 82947 ASSAY GLUCOSE BLOOD QUANT: CPT

## 2024-04-03 PROCEDURE — 9420000001 HC RT PATIENT EDUCATION 5 MIN

## 2024-04-03 PROCEDURE — 85027 COMPLETE CBC AUTOMATED: CPT | Performed by: FAMILY MEDICINE

## 2024-04-03 PROCEDURE — 2500000004 HC RX 250 GENERAL PHARMACY W/ HCPCS (ALT 636 FOR OP/ED): Performed by: FAMILY MEDICINE

## 2024-04-03 PROCEDURE — 93010 ELECTROCARDIOGRAM REPORT: CPT | Performed by: INTERNAL MEDICINE

## 2024-04-03 PROCEDURE — 36600 WITHDRAWAL OF ARTERIAL BLOOD: CPT

## 2024-04-03 PROCEDURE — 36415 COLL VENOUS BLD VENIPUNCTURE: CPT | Performed by: FAMILY MEDICINE

## 2024-04-03 PROCEDURE — 97530 THERAPEUTIC ACTIVITIES: CPT | Mod: GO,CO

## 2024-04-03 PROCEDURE — 97110 THERAPEUTIC EXERCISES: CPT | Mod: GP

## 2024-04-03 PROCEDURE — 2500000001 HC RX 250 WO HCPCS SELF ADMINISTERED DRUGS (ALT 637 FOR MEDICARE OP): Performed by: FAMILY MEDICINE

## 2024-04-03 PROCEDURE — 99231 SBSQ HOSP IP/OBS SF/LOW 25: CPT | Performed by: NURSE PRACTITIONER

## 2024-04-03 RX ORDER — DEXTROSE 50 % IN WATER (D50W) INTRAVENOUS SYRINGE
12.5
Status: DISCONTINUED | OUTPATIENT
Start: 2024-04-03 | End: 2024-04-05

## 2024-04-03 RX ORDER — DEXTROSE 50 % IN WATER (D50W) INTRAVENOUS SYRINGE
25
Status: DISCONTINUED | OUTPATIENT
Start: 2024-04-03 | End: 2024-04-05

## 2024-04-03 RX ORDER — POTASSIUM CHLORIDE 20 MEQ/1
20 TABLET, EXTENDED RELEASE ORAL 2 TIMES DAILY
Status: DISPENSED | OUTPATIENT
Start: 2024-04-03 | End: 2024-04-04

## 2024-04-03 RX ORDER — INSULIN LISPRO 100 [IU]/ML
0-5 INJECTION, SOLUTION INTRAVENOUS; SUBCUTANEOUS
Status: DISCONTINUED | OUTPATIENT
Start: 2024-04-03 | End: 2024-04-05 | Stop reason: HOSPADM

## 2024-04-03 RX ORDER — LOSARTAN POTASSIUM 100 MG/1
100 TABLET ORAL DAILY
Status: DISCONTINUED | OUTPATIENT
Start: 2024-04-03 | End: 2024-04-05

## 2024-04-03 RX ORDER — ATORVASTATIN CALCIUM 10 MG/1
10 TABLET, FILM COATED ORAL NIGHTLY
Status: DISCONTINUED | OUTPATIENT
Start: 2024-04-03 | End: 2024-04-05 | Stop reason: HOSPADM

## 2024-04-03 RX ORDER — ISOSORBIDE MONONITRATE 30 MG/1
30 TABLET, EXTENDED RELEASE ORAL DAILY
Status: DISCONTINUED | OUTPATIENT
Start: 2024-04-03 | End: 2024-04-05 | Stop reason: HOSPADM

## 2024-04-03 RX ORDER — METFORMIN HYDROCHLORIDE 500 MG/1
500 TABLET ORAL
Status: DISCONTINUED | OUTPATIENT
Start: 2024-04-03 | End: 2024-04-05 | Stop reason: HOSPADM

## 2024-04-03 RX ORDER — SODIUM CHLORIDE 9 MG/ML
100 INJECTION, SOLUTION INTRAVENOUS CONTINUOUS
Status: DISCONTINUED | OUTPATIENT
Start: 2024-04-03 | End: 2024-04-04

## 2024-04-03 RX ORDER — CEFTRIAXONE 1 G/50ML
1 INJECTION, SOLUTION INTRAVENOUS EVERY 24 HOURS
Status: DISCONTINUED | OUTPATIENT
Start: 2024-04-03 | End: 2024-04-04

## 2024-04-03 RX ADMIN — POTASSIUM CHLORIDE 20 MEQ: 1500 TABLET, EXTENDED RELEASE ORAL at 21:08

## 2024-04-03 RX ADMIN — SODIUM CHLORIDE 100 ML/HR: 900 INJECTION, SOLUTION INTRAVENOUS at 21:21

## 2024-04-03 RX ADMIN — METFORMIN HYDROCHLORIDE 500 MG: 500 TABLET ORAL at 16:36

## 2024-04-03 RX ADMIN — APIXABAN 2.5 MG: 2.5 TABLET, FILM COATED ORAL at 08:18

## 2024-04-03 RX ADMIN — ACETAMINOPHEN 650 MG: 325 TABLET ORAL at 12:00

## 2024-04-03 RX ADMIN — HYDROXYCHLOROQUINE SULFATE 200 MG: 200 TABLET ORAL at 08:19

## 2024-04-03 RX ADMIN — INSULIN LISPRO 2 UNITS: 100 INJECTION, SOLUTION INTRAVENOUS; SUBCUTANEOUS at 16:38

## 2024-04-03 RX ADMIN — HYDROXYCHLOROQUINE SULFATE 200 MG: 200 TABLET ORAL at 16:36

## 2024-04-03 RX ADMIN — DOCUSATE SODIUM 100 MG: 100 CAPSULE, LIQUID FILLED ORAL at 08:18

## 2024-04-03 RX ADMIN — ACETAMINOPHEN 650 MG: 325 TABLET ORAL at 17:44

## 2024-04-03 RX ADMIN — ASPIRIN 325 MG: 325 TABLET, COATED ORAL at 08:18

## 2024-04-03 RX ADMIN — ATORVASTATIN CALCIUM 10 MG: 10 TABLET, FILM COATED ORAL at 21:08

## 2024-04-03 RX ADMIN — IRON SUCROSE 200 MG: 20 INJECTION, SOLUTION INTRAVENOUS at 06:03

## 2024-04-03 RX ADMIN — PSYLLIUM HUSK 1 PACKET: 3.4 POWDER ORAL at 08:18

## 2024-04-03 RX ADMIN — HYDRALAZINE HYDROCHLORIDE 25 MG: 25 TABLET ORAL at 08:18

## 2024-04-03 RX ADMIN — ACETAMINOPHEN 650 MG: 325 TABLET ORAL at 06:08

## 2024-04-03 RX ADMIN — DOCUSATE SODIUM 100 MG: 100 CAPSULE, LIQUID FILLED ORAL at 21:08

## 2024-04-03 RX ADMIN — POLYETHYLENE GLYCOL 3350 17 G: 17 POWDER, FOR SOLUTION ORAL at 08:18

## 2024-04-03 RX ADMIN — APIXABAN 2.5 MG: 2.5 TABLET, FILM COATED ORAL at 21:08

## 2024-04-03 RX ADMIN — CEFTRIAXONE SODIUM 1 G: 1 INJECTION, SOLUTION INTRAVENOUS at 10:01

## 2024-04-03 RX ADMIN — SODIUM CHLORIDE 100 ML/HR: 900 INJECTION, SOLUTION INTRAVENOUS at 10:59

## 2024-04-03 ASSESSMENT — COGNITIVE AND FUNCTIONAL STATUS - GENERAL
DRESSING REGULAR LOWER BODY CLOTHING: TOTAL
STANDING UP FROM CHAIR USING ARMS: TOTAL
MOBILITY SCORE: 6
HELP NEEDED FOR BATHING: A LOT
TURNING FROM BACK TO SIDE WHILE IN FLAT BAD: TOTAL
PERSONAL GROOMING: A LITTLE
TOILETING: A LOT
WALKING IN HOSPITAL ROOM: TOTAL
CLIMB 3 TO 5 STEPS WITH RAILING: TOTAL
MOVING TO AND FROM BED TO CHAIR: TOTAL
EATING MEALS: A LITTLE
DRESSING REGULAR UPPER BODY CLOTHING: A LITTLE
MOVING FROM LYING ON BACK TO SITTING ON SIDE OF FLAT BED WITH BEDRAILS: TOTAL
DAILY ACTIVITIY SCORE: 14

## 2024-04-03 ASSESSMENT — PAIN - FUNCTIONAL ASSESSMENT
PAIN_FUNCTIONAL_ASSESSMENT: 0-10

## 2024-04-03 ASSESSMENT — PAIN SCALES - GENERAL
PAINLEVEL_OUTOF10: 0 - NO PAIN

## 2024-04-03 ASSESSMENT — ACTIVITIES OF DAILY LIVING (ADL): HOME_MANAGEMENT_TIME_ENTRY: 13

## 2024-04-03 ASSESSMENT — PAIN SCALES - WONG BAKER: WONGBAKER_NUMERICALRESPONSE: NO HURT

## 2024-04-03 NOTE — PROGRESS NOTES
TCC informed patient and spouse of the acceptance at [facility]. According to [facility] they will have a bed available on Thursday. I Team called on patient today due to hypotension BP 53/28, patient not medically clear for discharge at this time.     Discharge Plan; [facility] when medically clear.         Louise Mcconnell RN

## 2024-04-03 NOTE — PROGRESS NOTES
Occupational Therapy    OT Treatment    Patient Name: Estela Carnes  MRN: [id number]  Today's Date: 4/3/2024  Time Calculation  Start Time: 0843  Stop Time: 0906  Time Calculation (min): 23 min         Assessment:  OT Assessment: Tolerated session fairly, requiring increased cues for safety throughout session. Pt would benefit from continued skilled OT services to improve strength, balance, and functional tolerance to increase independence with ADL tasks and transfers  End of Session Communication: Bedside nurse  End of Session Patient Position: Up in chair, Alarm on  OT Assessment Results: Decreased ADL status, Decreased upper extremity strength, Decreased safe judgment during ADL, Decreased cognition, Decreased endurance, Visual deficit, Decreased fine motor control, Decreased functional mobility, Decreased gross motor control, Decreased trunk control for functional activities  Plan:  Treatment Interventions: ADL retraining, Functional transfer training, UE strengthening/ROM, Endurance training, Patient/family training, Equipment evaluation/education, Neuromuscular reeducation, Compensatory technique education, Cognitive reorientation  OT Frequency: 5 times per week  OT Discharge Recommendations: Moderate intensity level of continued care  Equipment Recommended upon Discharge: Wheeled walker  OT Recommended Transfer Status: Maximum assist, Assist of 2  OT - OK to Discharge: Yes  Treatment Interventions: ADL retraining, Functional transfer training, UE strengthening/ROM, Endurance training, Patient/family training, Equipment evaluation/education, Neuromuscular reeducation, Compensatory technique education, Cognitive reorientation    Subjective   Previous Visit Info:  OT Last Visit  OT Received On: 04/03/24  General:  General  Co-Treatment: PT  Co-Treatment Reason: two-person skilled assist for safe patient handling  Prior to Session Communication: Bedside nurse  Patient Position Received: Bed, 3 rail up, Alarm off,  not on at start of session  General Comment: Cleared for therapy per RN. Pt supine in bed upon arrival and agreeable to tx  Precautions:  LE Weight Bearing Status: Right Partial Weight Bearing  Medical Precautions: Fall precautions  Precautions Comment: Educated on PWB status with pt expressing understanding, with fair carryover    Pain:  Pain Assessment  Pain Assessment: 0-10  Pain Score: 0 - No pain    Objective    Cognition:  Cognition  Overall Cognitive Status: Impaired  Orientation Level: Disoriented to situation  Following Commands: Follows one step commands with repetition  Safety Judgment: Decreased awareness of need for safety precautions  Cognition Comments: pleasantly confused throughout  Insight: Severe  Processing Speed: Delayed    Activities of Daily Living: Feeding  Feeding Comments: s/u for feeding task at end of session    Grooming  Grooming Level of Assistance: Setup  Grooming Where Assessed: Chair  Grooming Comments: face washing task    UE Dressing  UE Dressing Level of Assistance: Minimum assistance  UE Dressing Where Assessed: Chair  UE Dressing Comments: don/doff gown    Bed Mobility/Transfers: Bed Mobility  Bed Mobility: Yes  Bed Mobility 1  Bed Mobility 1: Supine to sitting  Level of Assistance 1: Maximum assistance, Moderate verbal cues (x2)  Bed Mobility Comments 1: assist with BLE advancement and trunk elevation with cues for hand placement on bed rail for UE support, assist to scoot hips to EOB with use of drawsheet    Transfers  Transfer: Yes  Transfer 1  Technique 1: Sit to stand, Stand to sit  Transfer Device 1: Walker  Transfer Level of Assistance 1: Maximum assistance, +2, Minimal verbal cues  Trials/Comments 1: sit>stand to FWW with cues for proper hand placement and R foot placement for carryover of PWB status. Pt tolerated stand ~4 seconds, having to return to seated  Transfers 2  Transfer From 2: Bed to  Transfer to 2: Chair with arms  Technique 2: Sit to stand, Stand to  sit  Transfer Level of Assistance 2: Maximum assistance, +2, Moderate verbal cues  Trials/Comments 2: assist to lift hips from EOB with cues for proper hand placement and postural alignment in standing. Required assist for eccentric lowering to chair    Functional Mobility:  Functional Mobility  Functional Mobility Performed: Yes  Functional Mobility 1  Surface 1: Level tile  Assistance 1: Maximum assistance, Moderate verbal cues (x2)  Comments 1: Tolerated taking ~3 steps to chair with arm in arm assist with increased cues for step sequencing with poor carryover with pt expressing she is unable to take additional steps, requiring assist to lower hips to chair  Sitting Balance:  Static Sitting Balance  Static Sitting-Level of Assistance: Moderate assistance  Static Sitting-Comment/Number of Minutes: retropulsive upon initial sit with increased cues for postural alignment, progressing to CGA  Standing Balance:  Static Standing Balance  Static Standing-Level of Assistance: Maximum assistance (x2)  Static Standing-Comment/Number of Minutes: poor    Outcome Measures:[facility] Daily [facility]  Putting on and taking off regular lower body clothing: Total  Bathing (including washing, rinsing, drying): A lot  Putting on and taking off regular upper body clothing: A little  Toileting, which includes using toilet, bedpan or urinal: A lot  Taking care of personal grooming such as brushing teeth: A little  Eating Meals: A little  Daily Activity - Total Score: 14    Education Documentation  Precautions, taught by RAQUEL Price at 4/3/2024  9:59 AM.  Learner: Patient  Readiness: Acceptance  Method: Explanation  Response: Needs Reinforcement    Home Exercise Program, taught by RAQUEL Price at 4/3/2024  9:59 AM.  Learner: Patient  Readiness: Acceptance  Method: Explanation  Response: Needs Reinforcement    ADL Training, taught by RAQUEL Price at 4/3/2024  9:59 AM.  Learner: Patient  Readiness:  Acceptance  Method: Explanation  Response: Needs Reinforcement    Education Comments  No comments found.        Problem: OT Goals  Goal: Pt will complete all functional transfers and mobility with mod A using a RW while maintaining 25% WB RLE.  Outcome: Progressing  Goal: Pt will complete all grooming tasks with min A.  Outcome: Progressing  Goal: Pt will complete UB dressing/bathing with min A.  Outcome: Progressing  Goal: Pt will complete all toileting tasks with mod A.  Outcome: Progressing  Goal: Pt will participate in BUE exercises in order to enhance ADL's and functional transfers.  Outcome: Progressing

## 2024-04-03 NOTE — NURSING NOTE
Rapid response nurse, RT and dr. Hardy responding to I team called, pt opens eyes briefly to sternal rub, pt placed back in bed with assist and iv fluids started, pt quickly becomes more alert at this time

## 2024-04-03 NOTE — PROGRESS NOTES
Physical Therapy    Physical Therapy Treatment    Patient Name: Estela Carnes  MRN: [id number]  Today's Date: 4/3/2024  Time Calculation  Start Time: 0850  Stop Time: 0914  Time Calculation (min): 24 min       Assessment/Plan   PT Assessment  PT Assessment Results: Decreased strength, Decreased range of motion, Decreased endurance, Impaired balance, Decreased mobility, Decreased coordination, Decreased cognition, Pain  Rehab Prognosis: Good  Evaluation/Treatment Tolerance: Patient limited by fatigue, Patient tolerated treatment well  Medical Staff Made Aware: Yes  End of Session Communication: Bedside nurse  Assessment Comment: pt participatory; limited by fatigue; MAX A x 2 for all transfers; pt unable to maintain 25% WB on L LE  End of Session Patient Position: Up in chair, Alarm on (call button in reach)    PT Plan  Inpatient/Swing Bed or Outpatient: Inpatient  PT Plan  Treatment/Interventions: Bed mobility, Transfer training, Gait training, Balance training, Strengthening, Range of motion, Therapeutic exercise, Endurance training  PT Plan: Skilled PT  PT Frequency: 6 times per week  PT Discharge Recommendations: Moderate intensity level of continued care  Equipment Recommended upon Discharge: Wheeled walker  PT Recommended Transfer Status: Assist x2  PT - OK to Discharge: Yes      General Visit Information:   PT  Visit  PT Received On: 04/03/24  General  Reason for Referral: pt sustained a fall resulting in R hip fx; s/p ORIF 4/1/24 by Dr. Alcala; impaired cognition, ADL's/mobility  Referred By: Tim Alcala MD  Past Medical History Relevant to Rehab: dementia, anemia, anxiety,  vertigo, HLD, GERD, DM, paroxysmal a-fib, mac degen, [place], L5 fx, RA  Co-Treatment: OT  Co-Treatment Reason: two-person skilled assist for safe patient handling  Prior to Session Communication: Bedside nurse  Patient Position Received: Bed, 3 rail up  General Comment: pt alert with mild confusion and flat affect; pt cooperative and  able to follow simple commands    Subjective   Precautions:  Precautions  Hearing/Visual Limitations: wears glasses; mild [place]  LE Weight Bearing Status:  (25% WB on R LE)  Medical Precautions: Fall precautions       Objective   Pain:  Pain Assessment  Pain Assessment: 0-10    Cognition:  Cognition  Overall Cognitive Status: Impaired  Orientation Level: Disoriented to situation  Processing Speed: Delayed    Postural Control:  Postural Control  Posture Comment: flexed posture, rounded shoulders         Treatments:    Therapeutic Exercise Performed:  (B LE AAROM/AROM therex: AP, GS, QS, HS, ABD, LAQ x 10-15 reps; VC to stay on task)      Bed Mobility:  supine to sit with MAX A x 2 for trunk up, scooting and B LE. POOR+ static sitting balance initially with Left lateral lean on EOB; MOD A to assist with trunk in midline; pt able to achieve COG;       Transfer:  sit <-> stand with MAX A x 2 pulling up on braced RW; pt had difficulty maintaining 25% WB on LLE; mild retrolean noted; pt sat back on EOB. removed RW and performed stand pivot transfer from EOB to chair with handheld A x 2/MAX A x 2; chair alarm donned; RN notified         Outcome Measures:  [facility] Basic Mobility  Turning from your back to your side while in a flat bed without using bedrails: Total  Moving from lying on your back to sitting on the side of a flat bed without using bedrails: Total  Moving to and from bed to chair (including a wheelchair): Total  Standing up from a chair using your arms (e.g. wheelchair or bedside chair): Total  To walk in hospital room: Total  Climbing 3-5 steps with railing: Total  Basic Mobility - Total Score: 6         Encounter Problems       Encounter Problems (Active)       Mobility       STG - Patient will ambulate 60' x 1 using rolling walker with CGA (Progressing)       Start:  04/02/24    Expected End:  04/16/24               PT Transfers       STG - Patient to transfer to and from sit to supine with CGA (Progressing)        Start:  04/02/24    Expected End:  04/16/24            STG - Patient will transfer sit to and from stand with CGA (Progressing)       Start:  04/02/24    Expected End:  04/16/24               Pain - Adult

## 2024-04-03 NOTE — SIGNIFICANT EVENT
I responded to I team.  According to the nurse patient became unresponsive.  Patient was on the chair, pale diaphoretic.  Patient responded immediately to sternal rub.  She was alert and oriented to herself.  Has history of dementia.  She was able to tell me her date of birth.  Patient denied numbness or tingling upper or lower extremities.  She denied blurry vision loss of vision, headache.  Blood pressure was 53/28.  Blood glucose level 238.  Patient was moved from the chair to the bed.  Patient was started immediately on the fluids.  Blood pressure improved.  Blood pressure after 5 minutes was 130/74.    According to the nurse patient was on her baseline after 10 minutes.    General:  cooperating during physical exam, alert and oriented to herself and place  Maintain good conversation  HEENT: Pupils are equal and reactive to light and commendation , oral mucosa dry, no JVD , no facial asymmetry.  Cardiovascular: Normal sinus rhythm, no MRG.  Lungs: Clear to auscultation bilaterally, no wheezing, no crackles, no dullness to percussion.  Abdomen: No hepatosplenomegaly appreciated, soft , not tender, positive bowel sounds, positive bowel movement.  Neuro: Alert and oriented x2, strength in upper and lower extremities , sensation intact.  Musculoskeletal: Limitation in range of motion of right hip.  Patient had surgery  No bleeding from the place of incision  Vascular: Pulses are intact in upper and lower extremities  Skin: No petechiae, ecchymosis or other stigmata for dermatology disease.     Orthostatic hypotension  Syncope  Challenge gently with fluids.  Blood pressure improve immediately after the fluids started.  Improve clinically hemodynamically in few minutes.  Hold blood pressure medication today.  Continue with the fluids  Discussed in detail with Dr. Collins, admitting physician present in the room.  EKG  Has history of A-fib started on Eliquis 1 hour prior to event.  CT head with contrast  ABG  Placed on  telemetry  Monitor on RN F with telemetry for now.    Dr. Collins to follow  Fall precaution  Ambulate with assistance  I placed 20-gauge catheter in right external jugular vein.  Line flushed, line ready for use.    Status post right total hip replacement  On pain medication.    Diabetes mellitus type 2  Continue with current medication.  Blood glucose level was 258    History of A-fib  On Eliquis    Time spent with the patient 40 minutes critical care.

## 2024-04-03 NOTE — PROGRESS NOTES
Blood management follow up of this ERIN pt on day 4 of admission presenting with Closed displaced intertrochanteric fracture of right femur, initial encounter (CMS/[facility]). She is 3day s/p right trochanteric nailing. Nursing reports syncopal episode with hypotension when pt up to chair this morning, BP 52/38,  given fluid bolus bp respond 130/74, pt currently alert, oriented X1, at baseline. Increased H/H  9.4/29.5.     Subjective   Resting in bed, c/o right hip pain, medicated with tylenol, follows commands, denies headache, vertigo, chest pain, sob, abd pain, nausea, appetite poor, busby drain clear yellow.        Objective     Physical Exam  Constitutional:       Appearance: Normal appearance.   HENT:      Head: Normocephalic and atraumatic.      Right Ear: External ear normal.      Left Ear: External ear normal.      Nose: Nose normal.      Mouth/Throat:      Pharynx: Oropharynx is clear.   Eyes:      Conjunctiva/sclera: Conjunctivae normal.   Cardiovascular:      Rate and Rhythm: Rhythm irregular.      Heart sounds: Murmur heard.   Pulmonary:      Effort: Pulmonary effort is normal.      Breath sounds: Normal breath sounds.   Abdominal:      General: Bowel sounds are normal. There is no distension.      Palpations: Abdomen is soft.      Tenderness: There is no abdominal tenderness.   Genitourinary:     Comments: Busby cath drain clear yellow urine  Musculoskeletal:         General: Swelling and tenderness present.      Cervical back: Normal range of motion and neck supple.      Right lower leg: No edema.      Left lower leg: No edema.      Comments: Right hip tenderness, trace right hip swelling    Skin:     General: Skin is warm and dry.      Capillary Refill: Capillary refill takes 2 to 3 seconds.      Coloration: Skin is not pale.      Findings: No bruising, erythema, lesion or rash.      Comments: Dressing X2 right hip dry/intact   Neurological:      Mental Status: She is alert. Mental status is at baseline.  "She is disoriented.   Psychiatric:         Behavior: Behavior normal.         Last Recorded Vitals  Blood pressure 135/59, pulse 104, temperature 36.9 °C (98.4 °F), temperature source Oral, resp. rate 18, height 1.651 m (5' 5\"), weight 64 kg (141 lb 1.5 oz), SpO2 94 %.  Intake/Output last 3 Shifts:  I/O last 3 completed shifts:  In: 1538.7 (24 mL/kg) [P.O.:710; I.V.:618.7 (9.7 mL/kg); IV Piggyback:210]  Out: 2300 (35.9 mL/kg) [Urine:2300 (1 mL/kg/hr)]  Weight: 64 kg     Relevant Results  Results for orders placed or performed during the hospital encounter of 03/30/24 (from the past 24 hour(s))   CBC   Result Value Ref Range    WBC 10.2 4.4 - 11.3 x10*3/uL    nRBC 0.0 0.0 - 0.0 /100 WBCs    RBC 3.62 (L) 4.00 - 5.20 x10*6/uL    Hemoglobin 9.4 (L) 12.0 - 16.0 g/dL    Hematocrit 29.5 (L) 36.0 - 46.0 %    MCV 82 80 - 100 fL    MCH 26.0 26.0 - 34.0 pg    MCHC 31.9 (L) 32.0 - 36.0 g/dL    RDW 12.8 11.5 - 14.5 %    Platelets 237 150 - 450 x10*3/uL   Basic Metabolic Panel   Result Value Ref Range    Glucose 193 (H) 65 - 99 mg/dL    Sodium 141 133 - 145 mmol/L    Potassium 3.4 3.4 - 5.1 mmol/L    Chloride 103 97 - 107 mmol/L    Bicarbonate 27 24 - 31 mmol/L    Urea Nitrogen 12 8 - 25 mg/dL    Creatinine 0.40 0.40 - 1.60 mg/dL    eGFR >90 >60 mL/min/1.73m*2    Calcium 8.5 8.5 - 10.4 mg/dL    Anion Gap 11 <=19 mmol/L   POCT GLUCOSE   Result Value Ref Range    POCT Glucose 238 (H) 74 - 99 mg/dL   Blood Gas Arterial Full Panel   Result Value Ref Range    POCT pH, Arterial 7.49 (H) 7.38 - 7.42 pH    POCT pCO2, Arterial 33 (L) 38 - 42 mm Hg    POCT pO2, Arterial 113 (H) 85 - 95 mm Hg    POCT SO2, Arterial 98 94 - 100 %    POCT Oxy Hemoglobin, Arterial 97.0 94.0 - 98.0 %    POCT Hematocrit Calculated, Arterial 30.0 (L) 36.0 - 46.0 %    POCT Sodium, Arterial 136 136 - 145 mmol/L    POCT Potassium, Arterial 3.5 3.5 - 5.3 mmol/L    POCT Chloride, Arterial 104 98 - 107 mmol/L    POCT Ionized Calcium, Arterial 1.13 1.10 - 1.33 mmol/L    " POCT Glucose, Arterial 274 (H) 74 - 99 mg/dL    POCT Lactate, Arterial 1.7 0.4 - 2.0 mmol/L    POCT Base Excess, Arterial 2.0 -2.0 - 3.0 mmol/L    POCT HCO3 Calculated, Arterial 25.1 22.0 - 26.0 mmol/L    POCT Hemoglobin, Arterial 10.1 (L) 12.0 - 16.0 g/dL    POCT Anion Gap, Arterial 10 10 - 25 mmo/L    Patient Temperature 37.0 degrees Celsius    FiO2 28 %    Site of Arterial Puncture Radial Right     Ludin's Test Positive    ECG 12 Lead   Result Value Ref Range    Ventricular Rate 103 BPM    Atrial Rate 115 BPM    QRS Duration 116 ms    QT Interval 286 ms    QTC Calculation(Bazett) 374 ms    R Axis -3 degrees    T Axis -7 degrees    QRS Count 17 beats    Q Onset 226 ms    T Offset 369 ms    QTC Fredericia 342 ms   POCT GLUCOSE   Result Value Ref Range    POCT Glucose 245 (H) 74 - 99 mg/dL      Current Facility-Administered Medications:     [MAR Hold] acetaminophen (Tylenol) tablet 650 mg, 650 mg, oral, q4h PRN, Froylan Collins MD, 650 mg at 03/31/24 2126    acetaminophen (Tylenol) tablet 650 mg, 650 mg, oral, q6h JEREMY, Tim Alcala MD, 650 mg at 04/03/24 1200    apixaban (Eliquis) tablet 2.5 mg, 2.5 mg, oral, q12h JEREMY, Froylan Collins MD, 2.5 mg at 04/03/24 0818    aspirin EC tablet 325 mg, 325 mg, oral, Daily, Froylan Collins MD, 325 mg at 04/03/24 0818    atorvastatin (Lipitor) tablet 10 mg, 10 mg, oral, Nightly, Froylan Collins MD    cefTRIAXone (Rocephin) IVPB 1 g, 1 g, intravenous, q24h, Froylan Collins MD, Stopped at 04/03/24 1031    [MAR Hold] cyanocobalamin (Vitamin B-12) tablet 1,000 mcg, 1,000 mcg, oral, Daily, Froylan Collins MD, 1,000 mcg at 03/31/24 1055    dextrose 50 % injection 12.5 g, 12.5 g, intravenous, q15 min PRN, Froylan Collins MD    dextrose 50 % injection 25 g, 25 g, intravenous, q15 min PRN, Froylan Collins MD    docusate sodium (Colace) capsule 100 mg, 100 mg, oral, BID, Tim Alcala MD, 100 mg at 04/03/24 0818    glucagon (Glucagen) injection  1 mg, 1 mg, intramuscular, q15 min PRN, Froylan Collins MD    glucagon (Glucagen) injection 1 mg, 1 mg, intramuscular, q15 min PRN, Froylan Collins MD    [MAR Hold] guaiFENesin (Mucinex) 12 hr tablet 600 mg, 600 mg, oral, BID PRN, Froylan Collins MD    [Held by provider] hydrALAZINE (Apresoline) tablet 25 mg, 25 mg, oral, BID, Froylan Collins MD, 25 mg at 04/03/24 0818    HYDROcodone-acetaminophen (Norco) 5-325 mg per tablet 1 tablet, 1 tablet, oral, q4h PRN, Tim Alcala MD, 1 tablet at 04/02/24 0932    HYDROmorphone (Dilaudid) injection 0.5 mg, 0.5 mg, intravenous, q4h PRN, Tim Alcala MD    hydroxychloroquine (Plaquenil) tablet 200 mg, 200 mg, oral, BID with meals, Froylan Collins MD, 200 mg at 04/03/24 0819    insulin lispro (HumaLOG) injection 0-5 Units, 0-5 Units, subcutaneous, TID with meals, Froylan Collins MD    iron sucrose (Venofer) injection 200 mg, 200 mg, intravenous, Daily, Arely Lester, APRJHONY-CNP, 200 mg at 04/03/24 0603    [Held by provider] isosorbide mononitrate ER (Imdur) 24 hr tablet 30 mg, 30 mg, oral, Daily, Froylan Collins MD    [Held by provider] losartan (Cozaar) tablet 100 mg, 100 mg, oral, Daily, Froylan Collins MD    [MAR Hold] magnesium hydroxide (Milk of Magnesia) 400 mg/5 mL suspension 30 mL, 30 mL, oral, Nightly PRN, Froylan Collins MD    metFORMIN (Glucophage) tablet 500 mg, 500 mg, oral, BID with meals, Froylan Collins MD    [MAR Hold] morphine injection 2 mg, 2 mg, intravenous, q4h PRN, Froylan Collins MD    naloxone (Narcan) injection 0.2 mg, 0.2 mg, intravenous, q5 min PRN, Tim Alcala MD    naloxone (Narcan) injection 0.2 mg, 0.2 mg, intravenous, q5 min PRN, Tim Alcala MD    oxygen (O2) therapy, 2 L/min, inhalation, Continuous, Tim Alcala MD, Stopped at 04/01/24 1345    polyethylene glycol (Glycolax, Miralax) packet 17 g, 17 g, oral, Daily, Tim Alcala MD, 17 g at 04/03/24 0818    potassium  chloride CR (Klor-Con M20) ER tablet 20 mEq, 20 mEq, oral, BID, Froylan Collins MD    psyllium (Metamucil) 3.4 gram packet 1 packet, 1 packet, oral, Daily, Tim Alcala MD, 1 packet at 04/03/24 0818    SITagliptin phosphate (Januvia) tablet 100 mg, 100 mg, oral, Daily, Froylan Collins MD    sodium chloride 0.9% infusion, 100 mL/hr, intravenous, Continuous, Deborah Hardy MD, Last Rate: 100 mL/hr at 04/03/24 1535, 100 mL/hr at 04/03/24 1535    [MAR Hold] traMADol (Ultram) tablet 50 mg, 50 mg, oral, q8h PRN, Froylan Collins MD, 50 mg at 04/01/24 0612                              Assessment/Plan   Principal Problem:    Closed displaced intertrochanteric fracture of right femur, initial encounter (CMS/[facility])  Active Problems:    Chronic obstructive pulmonary disease (CMS/[facility])  S/p right trochanteric nail  HTN  afib  DM  dementia  Chronic mild normocytic anemia  Post op iron deficient anemia combined with anemia of acute blood loss and chronic disease anemia,   Venofer 200mg IV X3  Monitor H/H         Arely Lester, APRN-CNP

## 2024-04-03 NOTE — NURSING NOTE
Upon entering room , pt sitting up in chair, pt unresponsive to verbal stimuli, pt sweaty, blood sugar checked 238, I team called at this time,

## 2024-04-03 NOTE — PROGRESS NOTES
Estela Carnes is a 83 y.o. female on day 4 of admission presenting with Closed displaced intertrochanteric fracture of right femur, initial encounter (CMS/[facility]).      Subjective   Patient has minimal postop pain, otherwise doing okay       Objective     Last Recorded Vitals  /72 (BP Location: Left arm, Patient Position: Lying)   Pulse 79   Temp 36.7 °C (98.1 °F) (Oral)   Resp 16   Wt 64 kg (141 lb 1.5 oz)   SpO2 97%   Intake/Output last 3 Shifts:    Intake/Output Summary (Last 24 hours) at 4/3/2024 0838  Last data filed at 4/3/2024 0727  Gross per 24 hour   Intake 770 ml   Output 1925 ml   Net -1155 ml       Admission Weight  Weight: 64 kg (141 lb 1.5 oz) (03/30/24 1726)    Daily Weight  03/30/24 : 64 kg (141 lb 1.5 oz)    Image Results  FL hip during operative procedure  Addendum: Interpreted By:  Ela Ruano,    ADDENDUM:   Study: Fl hip during operative procedure        Signed by: Ela Ruano 4/2/2024 9:01 AM        -------- ORIGINAL REPORT --------   Dictation workstation:   [id number]  Narrative: Interpreted By:  Ela Ruano,   STUDY:  FL LESS THAN 1 HOUR 4/1/2024 1:01 pm      INDICATION:  Signs/Symptoms:Right hip intertrochanteric fracture      COMPARISON:  None available.      ACCESSION NUMBER(S):  [id number]      ORDERING CLINICIAN:  KAYLA KNAPP      TECHNIQUE:  49.7 seconds of intraoperative fluoroscopic guidance was utilized  with 2 images obtained intraoperatively utilizing the C-arm.      Air kerma: 7.91 mGy      FINDINGS:  On these 2 images, there is placement of threaded nail extending from  the subtrochanteric region of the proximal femur into the femoral  head with rosy extending from greater trochanter into the femoral  shaft. Orthopedic hardware transfixes the comminuted  intertrochanteric fracture of right hip.      Impression: Intraoperative fluoroscopic guidance provided during open reduction  and internal fixation of the intertrochanteric fracture of right hip.      Signed by:  Ela Alden 4/1/2024 1:16 PM  Dictation workstation:   [id number]      Physical Exam  General-awake, alert, oriented x3  Lung-clear to auscultation bilaterally  Heart-regular sinus rhythm and rhythm, S1 and S2 audible,no murmurs  Abdomen- soft, nontender, nondistended, good bowel sounds, no organomegaly, no mass, bowel sounds are present  Extremities-no clubbing, no cyanosis, no edema.  Right hip range of motion is decreased  Wound-postop dressing is clean/dry/intact.  Relevant Results               Assessment/Plan    Closed displaced intertrochanteric fracture of right femur, initial encounter (CMS/[facility])  Status post ORIF of right intertrochanteric fracture with nailing.  Postop day 2 ,continue pain control.  Decrease aspirin 325 mg p.o. daily.  PT/OT.  Needs precertification to go to rehab, choices are given     Paroxysmal atrial fibrillation-will restart Eliquis 2.5 mg p.o. twice daily     Diabetes mellitus type 2-will continue  Accu-Cheks before meals and at bedtime with Humalog sliding scale coverage.  Restart metformin 500 mg p.o. twice daily, Januvia 100 mg p.o. daily     UTI-according to UA and C&S from 3/29.  I will continue Rocephin 1 g IV daily.    I will discontinue Menard    Hypertension-I will start Cozaar 100 mg p.o. daily, Imdur 30 mg p.o. daily    Hypokalemia-will replace potassium    Anemia, iron deficiency-iron is less than 20, continue Venna fair 200 mg IV daily x 3 doses                              Froylan Collins MD

## 2024-04-03 NOTE — CARE PLAN
The patient's goals for the shift include      The clinical goals for the shift include no falls, low pain

## 2024-04-04 ENCOUNTER — TELEPHONE (OUTPATIENT)
Dept: INTENSIVE CARE | Facility: HOSPITAL | Age: 84
End: 2024-04-04
Payer: MEDICARE

## 2024-04-04 LAB
ANION GAP SERPL CALC-SCNC: 10 MMOL/L
ANION GAP SERPL CALC-SCNC: 9 MMOL/L
ATRIAL RATE: 115 BPM
BUN SERPL-MCNC: 11 MG/DL (ref 8–25)
BUN SERPL-MCNC: 12 MG/DL (ref 8–25)
CALCIUM SERPL-MCNC: 8.1 MG/DL (ref 8.5–10.4)
CALCIUM SERPL-MCNC: 8.4 MG/DL (ref 8.5–10.4)
CHLORIDE SERPL-SCNC: 107 MMOL/L (ref 97–107)
CHLORIDE SERPL-SCNC: 110 MMOL/L (ref 97–107)
CO2 SERPL-SCNC: 25 MMOL/L (ref 24–31)
CO2 SERPL-SCNC: 25 MMOL/L (ref 24–31)
CREAT SERPL-MCNC: 0.4 MG/DL (ref 0.4–1.6)
CREAT SERPL-MCNC: 0.4 MG/DL (ref 0.4–1.6)
EGFRCR SERPLBLD CKD-EPI 2021: >90 ML/MIN/1.73M*2
EGFRCR SERPLBLD CKD-EPI 2021: >90 ML/MIN/1.73M*2
ERYTHROCYTE [DISTWIDTH] IN BLOOD BY AUTOMATED COUNT: 13.2 % (ref 11.5–14.5)
GLUCOSE BLD MANUAL STRIP-MCNC: 149 MG/DL (ref 74–99)
GLUCOSE BLD MANUAL STRIP-MCNC: 154 MG/DL (ref 74–99)
GLUCOSE BLD MANUAL STRIP-MCNC: 170 MG/DL (ref 74–99)
GLUCOSE BLD MANUAL STRIP-MCNC: 178 MG/DL (ref 74–99)
GLUCOSE BLD MANUAL STRIP-MCNC: 189 MG/DL (ref 74–99)
GLUCOSE SERPL-MCNC: 176 MG/DL (ref 65–99)
GLUCOSE SERPL-MCNC: 248 MG/DL (ref 65–99)
HCT VFR BLD AUTO: 25.8 % (ref 36–46)
HGB BLD-MCNC: 8 G/DL (ref 12–16)
MCH RBC QN AUTO: 26.1 PG (ref 26–34)
MCHC RBC AUTO-ENTMCNC: 31 G/DL (ref 32–36)
MCV RBC AUTO: 84 FL (ref 80–100)
NRBC BLD-RTO: 0 /100 WBCS (ref 0–0)
PLATELET # BLD AUTO: 234 X10*3/UL (ref 150–450)
POTASSIUM SERPL-SCNC: 3.5 MMOL/L (ref 3.4–5.1)
POTASSIUM SERPL-SCNC: 3.7 MMOL/L (ref 3.4–5.1)
Q ONSET: 226 MS
QRS COUNT: 17 BEATS
QRS DURATION: 116 MS
QT INTERVAL: 286 MS
QTC CALCULATION(BAZETT): 374 MS
QTC FREDERICIA: 342 MS
R AXIS: -3 DEGREES
RBC # BLD AUTO: 3.06 X10*6/UL (ref 4–5.2)
SODIUM SERPL-SCNC: 142 MMOL/L (ref 133–145)
SODIUM SERPL-SCNC: 144 MMOL/L (ref 133–145)
T AXIS: -7 DEGREES
T OFFSET: 369 MS
VENTRICULAR RATE: 103 BPM
WBC # BLD AUTO: 8.6 X10*3/UL (ref 4.4–11.3)

## 2024-04-04 PROCEDURE — 97530 THERAPEUTIC ACTIVITIES: CPT | Mod: GP

## 2024-04-04 PROCEDURE — 99231 SBSQ HOSP IP/OBS SF/LOW 25: CPT | Performed by: NURSE PRACTITIONER

## 2024-04-04 PROCEDURE — 2500000004 HC RX 250 GENERAL PHARMACY W/ HCPCS (ALT 636 FOR OP/ED): Performed by: FAMILY MEDICINE

## 2024-04-04 PROCEDURE — 2500000004 HC RX 250 GENERAL PHARMACY W/ HCPCS (ALT 636 FOR OP/ED): Performed by: ORTHOPAEDIC SURGERY

## 2024-04-04 PROCEDURE — 80048 BASIC METABOLIC PNL TOTAL CA: CPT | Performed by: INTERNAL MEDICINE

## 2024-04-04 PROCEDURE — 97110 THERAPEUTIC EXERCISES: CPT | Mod: GO,CO

## 2024-04-04 PROCEDURE — 85027 COMPLETE CBC AUTOMATED: CPT | Performed by: INTERNAL MEDICINE

## 2024-04-04 PROCEDURE — 97530 THERAPEUTIC ACTIVITIES: CPT | Mod: GO,CO

## 2024-04-04 PROCEDURE — 2500000004 HC RX 250 GENERAL PHARMACY W/ HCPCS (ALT 636 FOR OP/ED): Performed by: NURSE PRACTITIONER

## 2024-04-04 PROCEDURE — 82947 ASSAY GLUCOSE BLOOD QUANT: CPT

## 2024-04-04 PROCEDURE — 1200000002 HC GENERAL ROOM WITH TELEMETRY DAILY

## 2024-04-04 PROCEDURE — 2500000004 HC RX 250 GENERAL PHARMACY W/ HCPCS (ALT 636 FOR OP/ED): Performed by: INTERNAL MEDICINE

## 2024-04-04 PROCEDURE — 2500000002 HC RX 250 W HCPCS SELF ADMINISTERED DRUGS (ALT 637 FOR MEDICARE OP, ALT 636 FOR OP/ED): Mod: MUE | Performed by: FAMILY MEDICINE

## 2024-04-04 PROCEDURE — 36415 COLL VENOUS BLD VENIPUNCTURE: CPT | Performed by: FAMILY MEDICINE

## 2024-04-04 PROCEDURE — 2500000001 HC RX 250 WO HCPCS SELF ADMINISTERED DRUGS (ALT 637 FOR MEDICARE OP): Performed by: ORTHOPAEDIC SURGERY

## 2024-04-04 PROCEDURE — 36415 COLL VENOUS BLD VENIPUNCTURE: CPT | Performed by: INTERNAL MEDICINE

## 2024-04-04 PROCEDURE — 2500000001 HC RX 250 WO HCPCS SELF ADMINISTERED DRUGS (ALT 637 FOR MEDICARE OP): Performed by: FAMILY MEDICINE

## 2024-04-04 PROCEDURE — 80048 BASIC METABOLIC PNL TOTAL CA: CPT | Performed by: FAMILY MEDICINE

## 2024-04-04 PROCEDURE — 97110 THERAPEUTIC EXERCISES: CPT | Mod: GP

## 2024-04-04 RX ORDER — CEPHALEXIN 500 MG/1
500 CAPSULE ORAL 3 TIMES DAILY
Status: DISCONTINUED | OUTPATIENT
Start: 2024-04-04 | End: 2024-04-05 | Stop reason: HOSPADM

## 2024-04-04 RX ORDER — LANOLIN ALCOHOL/MO/W.PET/CERES
400 CREAM (GRAM) TOPICAL DAILY
Status: DISCONTINUED | OUTPATIENT
Start: 2024-04-04 | End: 2024-04-05 | Stop reason: HOSPADM

## 2024-04-04 RX ORDER — AMLODIPINE BESYLATE 2.5 MG/1
2.5 TABLET ORAL DAILY
Status: DISCONTINUED | OUTPATIENT
Start: 2024-04-04 | End: 2024-04-05

## 2024-04-04 RX ORDER — POTASSIUM CHLORIDE 20 MEQ/1
20 TABLET, EXTENDED RELEASE ORAL 2 TIMES DAILY
Status: COMPLETED | OUTPATIENT
Start: 2024-04-04 | End: 2024-04-04

## 2024-04-04 RX ORDER — ASPIRIN 81 MG/1
162 TABLET ORAL DAILY
Status: DISCONTINUED | OUTPATIENT
Start: 2024-04-05 | End: 2024-04-04

## 2024-04-04 RX ORDER — LANOLIN ALCOHOL/MO/W.PET/CERES
1000 CREAM (GRAM) TOPICAL DAILY
Status: DISCONTINUED | OUTPATIENT
Start: 2024-04-04 | End: 2024-04-05 | Stop reason: HOSPADM

## 2024-04-04 RX ORDER — METOPROLOL SUCCINATE 25 MG/1
25 TABLET, EXTENDED RELEASE ORAL DAILY
Status: DISCONTINUED | OUTPATIENT
Start: 2024-04-05 | End: 2024-04-05 | Stop reason: HOSPADM

## 2024-04-04 RX ADMIN — Medication 400 MG: at 11:04

## 2024-04-04 RX ADMIN — HYDROXYCHLOROQUINE SULFATE 200 MG: 200 TABLET ORAL at 17:00

## 2024-04-04 RX ADMIN — POLYETHYLENE GLYCOL 3350 17 G: 17 POWDER, FOR SOLUTION ORAL at 08:21

## 2024-04-04 RX ADMIN — POTASSIUM CHLORIDE 20 MEQ: 1500 TABLET, EXTENDED RELEASE ORAL at 11:04

## 2024-04-04 RX ADMIN — CYANOCOBALAMIN TAB 1000 MCG 1000 MCG: 1000 TAB at 09:40

## 2024-04-04 RX ADMIN — ASPIRIN 325 MG: 325 TABLET, COATED ORAL at 08:21

## 2024-04-04 RX ADMIN — METFORMIN HYDROCHLORIDE 500 MG: 500 TABLET ORAL at 08:21

## 2024-04-04 RX ADMIN — METFORMIN HYDROCHLORIDE 500 MG: 500 TABLET ORAL at 17:00

## 2024-04-04 RX ADMIN — ACETAMINOPHEN 650 MG: 325 TABLET ORAL at 00:03

## 2024-04-04 RX ADMIN — POTASSIUM CHLORIDE 20 MEQ: 1500 TABLET, EXTENDED RELEASE ORAL at 20:44

## 2024-04-04 RX ADMIN — CEFTRIAXONE SODIUM 1 G: 1 INJECTION, SOLUTION INTRAVENOUS at 08:24

## 2024-04-04 RX ADMIN — APIXABAN 2.5 MG: 2.5 TABLET, FILM COATED ORAL at 08:21

## 2024-04-04 RX ADMIN — CEPHALEXIN 500 MG: 500 CAPSULE ORAL at 14:00

## 2024-04-04 RX ADMIN — IRON SUCROSE 200 MG: 20 INJECTION, SOLUTION INTRAVENOUS at 06:38

## 2024-04-04 RX ADMIN — SODIUM CHLORIDE 100 ML/HR: 900 INJECTION, SOLUTION INTRAVENOUS at 07:23

## 2024-04-04 RX ADMIN — AMLODIPINE BESYLATE 2.5 MG: 2.5 TABLET ORAL at 09:40

## 2024-04-04 RX ADMIN — DOCUSATE SODIUM 100 MG: 100 CAPSULE, LIQUID FILLED ORAL at 08:21

## 2024-04-04 RX ADMIN — PSYLLIUM HUSK 1 PACKET: 3.4 POWDER ORAL at 08:21

## 2024-04-04 RX ADMIN — APIXABAN 2.5 MG: 2.5 TABLET, FILM COATED ORAL at 20:44

## 2024-04-04 RX ADMIN — CEPHALEXIN 500 MG: 500 CAPSULE ORAL at 20:44

## 2024-04-04 RX ADMIN — ACETAMINOPHEN 650 MG: 325 TABLET ORAL at 23:07

## 2024-04-04 RX ADMIN — SITAGLIPTIN 100 MG: 100 TABLET, FILM COATED ORAL at 08:21

## 2024-04-04 RX ADMIN — ACETAMINOPHEN 650 MG: 325 TABLET ORAL at 06:37

## 2024-04-04 RX ADMIN — ACETAMINOPHEN 650 MG: 325 TABLET ORAL at 17:00

## 2024-04-04 RX ADMIN — ATORVASTATIN CALCIUM 10 MG: 10 TABLET, FILM COATED ORAL at 20:44

## 2024-04-04 RX ADMIN — ACETAMINOPHEN 650 MG: 325 TABLET ORAL at 11:03

## 2024-04-04 RX ADMIN — HYDROXYCHLOROQUINE SULFATE 200 MG: 200 TABLET ORAL at 08:25

## 2024-04-04 ASSESSMENT — COGNITIVE AND FUNCTIONAL STATUS - GENERAL
MOBILITY SCORE: 6
HELP NEEDED FOR BATHING: TOTAL
WALKING IN HOSPITAL ROOM: TOTAL
CLIMB 3 TO 5 STEPS WITH RAILING: TOTAL
MOVING TO AND FROM BED TO CHAIR: TOTAL
PERSONAL GROOMING: A LITTLE
TURNING FROM BACK TO SIDE WHILE IN FLAT BAD: TOTAL
TOILETING: A LOT
DRESSING REGULAR LOWER BODY CLOTHING: TOTAL
HELP NEEDED FOR BATHING: A LOT
PERSONAL GROOMING: A LITTLE
STANDING UP FROM CHAIR USING ARMS: TOTAL
STANDING UP FROM CHAIR USING ARMS: TOTAL
WALKING IN HOSPITAL ROOM: TOTAL
DRESSING REGULAR UPPER BODY CLOTHING: A LITTLE
MOVING FROM LYING ON BACK TO SITTING ON SIDE OF FLAT BED WITH BEDRAILS: TOTAL
EATING MEALS: A LITTLE
DRESSING REGULAR UPPER BODY CLOTHING: A LOT
EATING MEALS: A LITTLE
MOBILITY SCORE: 6
DAILY ACTIVITIY SCORE: 12
DRESSING REGULAR LOWER BODY CLOTHING: TOTAL
MOVING TO AND FROM BED TO CHAIR: TOTAL
TOILETING: A LOT
DAILY ACTIVITIY SCORE: 14
TURNING FROM BACK TO SIDE WHILE IN FLAT BAD: TOTAL
MOVING FROM LYING ON BACK TO SITTING ON SIDE OF FLAT BED WITH BEDRAILS: TOTAL
CLIMB 3 TO 5 STEPS WITH RAILING: TOTAL

## 2024-04-04 ASSESSMENT — PAIN - FUNCTIONAL ASSESSMENT
PAIN_FUNCTIONAL_ASSESSMENT: 0-10
PAIN_FUNCTIONAL_ASSESSMENT: WONG-BAKER FACES

## 2024-04-04 ASSESSMENT — PAIN SCALES - GENERAL
PAINLEVEL_OUTOF10: 0 - NO PAIN
PAINLEVEL_OUTOF10: 4
PAINLEVEL_OUTOF10: 0 - NO PAIN
PAINLEVEL_OUTOF10: 3

## 2024-04-04 ASSESSMENT — PAIN DESCRIPTION - ORIENTATION: ORIENTATION: RIGHT

## 2024-04-04 ASSESSMENT — PAIN SCALES - WONG BAKER: WONGBAKER_NUMERICALRESPONSE: NO HURT

## 2024-04-04 ASSESSMENT — PAIN DESCRIPTION - LOCATION: LOCATION: HIP

## 2024-04-04 NOTE — PROGRESS NOTES
Music Therapy Note    Estela Carnes was referred by     Therapy Session  Referral Type: New referral this admission  Visit Type: New visit  Session Start Time: 1007  Session End Time: 1025  Intervention Delivery: In-person  Conflict of Service: None  Family Present for Session: None  Number of staff members present: 2     Pre-assessment  Unable to Assess Reason: Outcomes not assessed  Mood/Affect: Calm, Appropriate         Treatment/Interventions  Areas of Focus: Coping, Pain management  Music Therapy Interventions: Live music listening  Interruption: No  Patient Fell Asleep at End of Session: No    Post-assessment  Unable to Assess Reason: Outcomes not evaluated  Mood/Affect: Appropriate, Calm  Verbalized Emotional State: Happiness, Enjoyment  Continue Visiting: Yes  Total Session Time (min): 18 minutes    Narrative  Assessment Detail: Pt was found laying in bed. Pt was welcoming to services and stated enjoying country music.  Plan: To improve coping and pain management through music intervention  Intervention: MT played live versions of Ring of Fire, Hey Good Lookin, Coat of Many Colors, and What a Wonderful World  Evaluation: Pt became tearful during first song and stated that it was because music means so much to her. Pt told MT that shecould return at any time. Pt remained engaged with music, even when team came in to draw blood half way through.  Follow-up: Will follow up throughout admission    Education Documentation  No documentation found.

## 2024-04-04 NOTE — PROGRESS NOTES
Physical Therapy    Physical Therapy Treatment    Patient Name: Estela Carnes  MRN: [id number]  Today's Date: 4/4/2024  Time Calculation  Start Time: 1034  Stop Time: 1057  Time Calculation (min): 23 min       Assessment/Plan   PT Assessment  PT Assessment Results: Decreased strength, Decreased range of motion, Decreased endurance, Impaired balance, Decreased mobility, Decreased coordination, Decreased cognition, Pain  Rehab Prognosis: Good  Evaluation/Treatment Tolerance: Patient limited by fatigue  End of Session Communication: Bedside nurse  Assessment Comment: pt participatory and required MAX A x 2 for transfers; pt unable to maintain 25% WB on L LE; pt limited by moderate lightheadedness  End of Session Patient Position: Bed, 3 rail up, Alarm on (call button in reach)  PT Plan  Inpatient/Swing Bed or Outpatient: Inpatient  PT Plan  Treatment/Interventions: Bed mobility, Transfer training, Gait training, Balance training, Strengthening, Range of motion, Therapeutic exercise, Endurance training  PT Plan: Skilled PT  PT Frequency: 6 times per week  PT Discharge Recommendations: Moderate intensity level of continued care  Equipment Recommended upon Discharge: Wheeled walker  PT Recommended Transfer Status: Assist x2  PT - OK to Discharge: Yes      General Visit Information:   PT  Visit  PT Received On: 04/04/24  General  Reason for Referral: pt sustained a fall resulting in R hip fx; s/p ORIF 4/1/24 by Dr. Alcala; impaired cognition, ADL's/mobility  Past Medical History Relevant to Rehab: dementia, anemia, anxiety,  vertigo, HLD, GERD, DM, paroxysmal a-fib, mac degen, [place]isco, L5 fx, RA  Co-Treatment: OT  Co-Treatment Reason: two-person skilled assist for safe patient handling  Prior to Session Communication: Bedside nurse  Patient Position Received: Bed, 3 rail up, Alarm on  General Comment: pt alert and more talkative today; mild confusion noted    Subjective   Precautions:  Precautions  Hearing/Visual  Limitations: wears glasses; mild [place]  LE Weight Bearing Status:  (25% WB on R LE)  Medical Precautions: Fall precautions  Precautions Comment: edcuated pt on 25% WB R LE    Vital Signs:  Vital Signs  Heart Rate: (!) 114  SpO2:  (97% on room air)  BP: 157/73    Objective   Pain:  Pain Assessment  Pain Assessment:  (0/10)    Cognition:  Cognition  Overall Cognitive Status: Impaired    Postural Control:  Postural Control  Posture Comment: flexed posture, rounded shoulders    Activity Tolerance:  Activity Tolerance  Endurance:  (FAIR activity tolerance)  Treatments:    Therapeutic Exercise Performed:  (B LE AAROM/AROM therex: AP, GS, QS, HS, ABD and LAQ x 10-15 reps; VC to stay on task)       Bed Mobility:  supine to sit with MOD A x 2 for trunk up, B LE and scooting; sit  to supine with MAX A x 2 for trunk down and BLE; repositioned pt in supine for comfort      Transfer:  sit to stand x 2 trials with MAX A x 2; pt stood on 1st trial using rolling walker. + retrolean noted. pt unable to maintain 25% WB on R LE; rest break on EOB; on 2nd trial pt stood with HHA x/MAX A x 2. pt began to c/o moderate dizziness; assisted pt back to supine; BP taken; RN notified       Outcome Measures:  [facility] Basic Mobility  Turning from your back to your side while in a flat bed without using bedrails: Total  Moving from lying on your back to sitting on the side of a flat bed without using bedrails: Total  Moving to and from bed to chair (including a wheelchair): Total  Standing up from a chair using your arms (e.g. wheelchair or bedside chair): Total  To walk in hospital room: Total  Climbing 3-5 steps with railing: Total  Basic Mobility - Total Score: 6    OP EDUCATION:  Outpatient Education  Education Comment: safety techniques during mobility    Encounter Problems       Encounter Problems (Active)       Mobility       STG - Patient will ambulate 60' x 1 using rolling walker with CGA (Progressing)       Start:  04/02/24    Expected End:   04/16/24               PT Transfers       STG - Patient to transfer to and from sit to supine with CGA (Progressing)       Start:  04/02/24    Expected End:  04/16/24            STG - Patient will transfer sit to and from stand with CGA (Progressing)       Start:  04/02/24    Expected End:  04/16/24               Pain - Adult

## 2024-04-04 NOTE — PROGRESS NOTES
Estela Carnes is a 83 y.o. female on day 5 of admission presenting with Closed displaced intertrochanteric fracture of right femur, initial encounter (CMS/[facility]).      Subjective   Patient is sitting in bed, eating, minimal postop pain       Objective     Last Recorded Vitals  /79 (BP Location: Right arm, Patient Position: Lying)   Pulse 79   Temp 36.8 °C (98.2 °F) (Oral)   Resp 17   Wt 64 kg (141 lb 1.5 oz)   SpO2 96%   Intake/Output last 3 Shifts:    Intake/Output Summary (Last 24 hours) at 4/4/2024 0949  Last data filed at 4/4/2024 0900  Gross per 24 hour   Intake 3334.99 ml   Output 1050 ml   Net 2284.99 ml       Admission Weight  Weight: 64 kg (141 lb 1.5 oz) (03/30/24 1726)    Daily Weight  03/30/24 : 64 kg (141 lb 1.5 oz)    Image Results  CT head wo IV contrast  Narrative: Interpreted By:  Dinah Walton,   STUDY:  CT HEAD WO IV CONTRAST; ;  4/3/2024 11:42 am      INDICATION:  Signs/Symptoms:encephalopathy.      COMPARISON:  03/29/2024      ACCESSION NUMBER(S):  [id number]      ORDERING CLINICIAN:  DONNA LOZA      TECHNIQUE:  Serial axial unenhanced CT images obtained of the head. Images  reformatted in the coronal and sagittal projection.      FINDINGS:  Moderate parenchymal volume loss with prominence of the ventricles,  sulci, and cisterns there is moderate periventricular and subcortical  white matter change seen likely related to chronic small-vessel  ischemic change. No intra-axial or extra-axial blood or fluid  collections are identified.      Visualized osseous structures demonstrate unremarkable paranasal  sinuses. There is mild mucosal thickening of the right maxillary  sinus. Mastoid air cells are unremarkable.      Impression: 1. No acute intracranial abnormality      2. Parenchymal volume loss and moderate periventricular and  subcortical white matter change nonspecific and likely related to  chronic small-vessel ischemic change.          MACRO:  None      Signed by:  Dinah Walton 4/3/2024 1:02 PM  Dictation workstation:   [id number]  ECG 12 Lead  Atrial fibrillation with rapid ventricular response  Incomplete right bundle branch block  Nonspecific ST and T wave abnormality  Abnormal ECG  When compared with ECG of 30-MAR-2024 23:21,  Atrial fibrillation has replaced Sinus rhythm  QT has shortened      Physical Exam  General-awake, alert, oriented x2  Lung-clear to auscultation bilaterally  Heart-regular sinus rhythm and rhythm, S1 and S2 audible,no murmurs  Abdomen- soft, nontender, nondistended, good bowel sounds, no organomegaly, no mass, bowel sounds are present  Extremities-no clubbing, no cyanosis, no edema.  Right hip range of motion is decreased  Wound-postop dressing is clean/dry/intact.    Relevant Results               Assessment/Plan    Closed displaced intertrochanteric fracture of right femur, initial encounter (CMS/[facility])  Status post ORIF of right intertrochanteric fracture with nailing.  Postop day 3 ,continue pain control.  Decrease aspirin 162 mg p.o. daily.  PT/OT.  Needs precertification to go to rehab.  Waiting for theinsurance response     Paroxysmal atrial fibrillation-will restart Eliquis 2.5 mg p.o. twice daily     Diabetes mellitus type 2-will continue  Accu-Cheks before meals and at bedtime with Humalog sliding scale coverage.  Restart metformin 500 mg p.o. twice daily, Januvia 100 mg p.o. daily     UTI-according to UA and C&S from 3/29.  I will change to oral Keflex 500 mg p.o. 3 times daily     Urinary retention-I will remove Menard with voiding trial     Hypertension-I will hold Cozaar and start Norvasc 2.5 mg p.o. daily and restart Imdur 30 mg p.o. daily.  Discontinue hydralazine and monitor     Hypokalemia-will replace potassium, check magnesium in the morning     Anemia, iron deficiency-iron is less than 20, continue Venna fair 200 mg IV daily x 3 doses      This patient has a urinary catheter   Reason for the urinary catheter remaining today?  Urine catheter unnecessary, will be removed today                          Froylan Collins MD

## 2024-04-04 NOTE — CARE PLAN
Patient understands condition, prognosis and need for follow up care. The patient's goals for the shift include  sleep    The clinical goals for the shift include no falls, low pain

## 2024-04-04 NOTE — PROGRESS NOTES
Blood management follow up of this postop ERIN pt on day 5 of admission presenting with Closed displaced intertrochanteric fracture of right femur, initial encounter (CMS/[facility]). She is 4 day s/p right trochanteric nailing. Decreased H/H 8/25.8, no overt blood loss other than surgical.     Subjective   Resting in bed, oriented to self, follows commands, denies pain at rest, appetite fair. pt reports having 2stools today, nursing confirmed pt had 3loose stools, no hematochezia, melena, busby drain sivan yellow. Denies vertigo, nausea, chest pain, sob, cough, peripheral paresthesia,        Objective     Physical Exam  Constitutional:       Appearance: Normal appearance.   HENT:      Head: Normocephalic and atraumatic.      Right Ear: External ear normal.      Left Ear: External ear normal.      Nose: Nose normal.      Mouth/Throat:      Pharynx: Oropharynx is clear.   Eyes:      Conjunctiva/sclera: Conjunctivae normal.      Pupils: Pupils are equal, round, and reactive to light.   Cardiovascular:      Rate and Rhythm: Normal rate. Rhythm irregular.      Heart sounds: Murmur heard.   Pulmonary:      Effort: Pulmonary effort is normal. No respiratory distress.      Breath sounds: Normal breath sounds. No wheezing, rhonchi or rales.   Chest:      Chest wall: No tenderness.   Abdominal:      General: Bowel sounds are normal. There is no distension.      Palpations: Abdomen is soft.      Tenderness: There is no abdominal tenderness.   Genitourinary:     Comments: Busby cath drain sivan yellow urine  Musculoskeletal:         General: Tenderness present.      Cervical back: Normal range of motion and neck supple.      Right lower leg: No edema.      Left lower leg: No edema.   Skin:     General: Skin is warm and dry.      Capillary Refill: Capillary refill takes 2 to 3 seconds.      Coloration: Skin is not pale.      Findings: No bruising, erythema, lesion or rash.      Comments: Dressing right hip dry/intact   Neurological:     "  Mental Status: She is alert. Mental status is at baseline. She is disoriented.   Psychiatric:         Behavior: Behavior normal.         Last Recorded Vitals  Blood pressure (!) 151/98, pulse 86, temperature 36.5 °C (97.7 °F), temperature source Oral, resp. rate 16, height 1.651 m (5' 5\"), weight 64 kg (141 lb 1.5 oz), SpO2 97 %.  Intake/Output last 3 Shifts:  I/O last 3 completed shifts:  In: 3245 (50.7 mL/kg) [P.O.:1090; I.V.:1995 (31.2 mL/kg); IV Piggyback:160]  Out: 2125 (33.2 mL/kg) [Urine:2125 (0.9 mL/kg/hr)]  Weight: 64 kg     Relevant Results  Results for orders placed or performed during the hospital encounter of 03/30/24 (from the past 96 hour(s))   POCT GLUCOSE   Result Value Ref Range    POCT Glucose 144 (H) 74 - 99 mg/dL   Basic Metabolic Panel   Result Value Ref Range    Glucose 196 (H) 65 - 99 mg/dL    Sodium 141 133 - 145 mmol/L    Potassium 3.4 3.4 - 5.1 mmol/L    Chloride 104 97 - 107 mmol/L    Bicarbonate 27 24 - 31 mmol/L    Urea Nitrogen 24 8 - 25 mg/dL    Creatinine 0.50 0.40 - 1.60 mg/dL    eGFR >90 >60 mL/min/1.73m*2    Calcium 8.3 (L) 8.5 - 10.4 mg/dL    Anion Gap 10 <=19 mmol/L   Lavender Top   Result Value Ref Range    Extra Tube Hold for add-ons.    Iron and TIBC   Result Value Ref Range    Iron <20 (L) 30 - 160 ug/dL    UIBC 182 110 - 370 ug/dL    TIBC      % Saturation     CBC   Result Value Ref Range    WBC 9.7 4.4 - 11.3 x10*3/uL    nRBC 0.0 0.0 - 0.0 /100 WBCs    RBC 3.34 (L) 4.00 - 5.20 x10*6/uL    Hemoglobin 8.7 (L) 12.0 - 16.0 g/dL    Hematocrit 27.9 (L) 36.0 - 46.0 %    MCV 84 80 - 100 fL    MCH 26.0 26.0 - 34.0 pg    MCHC 31.2 (L) 32.0 - 36.0 g/dL    RDW 13.0 11.5 - 14.5 %    Platelets 216 150 - 450 x10*3/uL   Blood Bank Hold Tube   Result Value Ref Range    Extra Tube Hold for add-ons.    CBC   Result Value Ref Range    WBC 10.2 4.4 - 11.3 x10*3/uL    nRBC 0.0 0.0 - 0.0 /100 WBCs    RBC 3.62 (L) 4.00 - 5.20 x10*6/uL    Hemoglobin 9.4 (L) 12.0 - 16.0 g/dL    Hematocrit 29.5 " (L) 36.0 - 46.0 %    MCV 82 80 - 100 fL    MCH 26.0 26.0 - 34.0 pg    MCHC 31.9 (L) 32.0 - 36.0 g/dL    RDW 12.8 11.5 - 14.5 %    Platelets 237 150 - 450 x10*3/uL   Basic Metabolic Panel   Result Value Ref Range    Glucose 193 (H) 65 - 99 mg/dL    Sodium 141 133 - 145 mmol/L    Potassium 3.4 3.4 - 5.1 mmol/L    Chloride 103 97 - 107 mmol/L    Bicarbonate 27 24 - 31 mmol/L    Urea Nitrogen 12 8 - 25 mg/dL    Creatinine 0.40 0.40 - 1.60 mg/dL    eGFR >90 >60 mL/min/1.73m*2    Calcium 8.5 8.5 - 10.4 mg/dL    Anion Gap 11 <=19 mmol/L   POCT GLUCOSE   Result Value Ref Range    POCT Glucose 238 (H) 74 - 99 mg/dL   Blood Gas Arterial Full Panel   Result Value Ref Range    POCT pH, Arterial 7.49 (H) 7.38 - 7.42 pH    POCT pCO2, Arterial 33 (L) 38 - 42 mm Hg    POCT pO2, Arterial 113 (H) 85 - 95 mm Hg    POCT SO2, Arterial 98 94 - 100 %    POCT Oxy Hemoglobin, Arterial 97.0 94.0 - 98.0 %    POCT Hematocrit Calculated, Arterial 30.0 (L) 36.0 - 46.0 %    POCT Sodium, Arterial 136 136 - 145 mmol/L    POCT Potassium, Arterial 3.5 3.5 - 5.3 mmol/L    POCT Chloride, Arterial 104 98 - 107 mmol/L    POCT Ionized Calcium, Arterial 1.13 1.10 - 1.33 mmol/L    POCT Glucose, Arterial 274 (H) 74 - 99 mg/dL    POCT Lactate, Arterial 1.7 0.4 - 2.0 mmol/L    POCT Base Excess, Arterial 2.0 -2.0 - 3.0 mmol/L    POCT HCO3 Calculated, Arterial 25.1 22.0 - 26.0 mmol/L    POCT Hemoglobin, Arterial 10.1 (L) 12.0 - 16.0 g/dL    POCT Anion Gap, Arterial 10 10 - 25 mmo/L    Patient Temperature 37.0 degrees Celsius    FiO2 28 %    Site of Arterial Puncture Radial Right     Ludin's Test Positive    ECG 12 Lead   Result Value Ref Range    Ventricular Rate 103 BPM    Atrial Rate 115 BPM    QRS Duration 116 ms    QT Interval 286 ms    QTC Calculation(Bazett) 374 ms    R Axis -3 degrees    T Axis -7 degrees    QRS Count 17 beats    Q Onset 226 ms    T Offset 369 ms    QTC Fredericia 342 ms   POCT GLUCOSE   Result Value Ref Range    POCT Glucose 245 (H) 74 -  99 mg/dL   POCT GLUCOSE   Result Value Ref Range    POCT Glucose 201 (H) 74 - 99 mg/dL   POCT GLUCOSE   Result Value Ref Range    POCT Glucose 130 (H) 74 - 99 mg/dL   POCT GLUCOSE   Result Value Ref Range    POCT Glucose 154 (H) 74 - 99 mg/dL   CBC   Result Value Ref Range    WBC 8.6 4.4 - 11.3 x10*3/uL    nRBC 0.0 0.0 - 0.0 /100 WBCs    RBC 3.06 (L) 4.00 - 5.20 x10*6/uL    Hemoglobin 8.0 (L) 12.0 - 16.0 g/dL    Hematocrit 25.8 (L) 36.0 - 46.0 %    MCV 84 80 - 100 fL    MCH 26.1 26.0 - 34.0 pg    MCHC 31.0 (L) 32.0 - 36.0 g/dL    RDW 13.2 11.5 - 14.5 %    Platelets 234 150 - 450 x10*3/uL   Basic metabolic panel   Result Value Ref Range    Glucose 176 (H) 65 - 99 mg/dL    Sodium 144 133 - 145 mmol/L    Potassium 3.5 3.4 - 5.1 mmol/L    Chloride 110 (H) 97 - 107 mmol/L    Bicarbonate 25 24 - 31 mmol/L    Urea Nitrogen 11 8 - 25 mg/dL    Creatinine 0.40 0.40 - 1.60 mg/dL    eGFR >90 >60 mL/min/1.73m*2    Calcium 8.1 (L) 8.5 - 10.4 mg/dL    Anion Gap 9 <=19 mmol/L   POCT GLUCOSE   Result Value Ref Range    POCT Glucose 178 (H) 74 - 99 mg/dL   Basic Metabolic Panel   Result Value Ref Range    Glucose 248 (H) 65 - 99 mg/dL    Sodium 142 133 - 145 mmol/L    Potassium 3.7 3.4 - 5.1 mmol/L    Chloride 107 97 - 107 mmol/L    Bicarbonate 25 24 - 31 mmol/L    Urea Nitrogen 12 8 - 25 mg/dL    Creatinine 0.40 0.40 - 1.60 mg/dL    eGFR >90 >60 mL/min/1.73m*2    Calcium 8.4 (L) 8.5 - 10.4 mg/dL    Anion Gap 10 <=19 mmol/L   POCT GLUCOSE   Result Value Ref Range    POCT Glucose 189 (H) 74 - 99 mg/dL      Current Facility-Administered Medications:     [MAR Hold] acetaminophen (Tylenol) tablet 650 mg, 650 mg, oral, q4h PRN, Froylan Collins MD, 650 mg at 03/31/24 2126    acetaminophen (Tylenol) tablet 650 mg, 650 mg, oral, q6h JEREMY, Tim Alcala MD, 650 mg at 04/04/24 1103    amLODIPine (Norvasc) tablet 2.5 mg, 2.5 mg, oral, Daily, Froylan Collins MD, 2.5 mg at 04/04/24 0940    apixaban (Eliquis) tablet 2.5 mg, 2.5 mg,  oral, q12h JEREMY, Froylan Collins MD, 2.5 mg at 04/04/24 0821    atorvastatin (Lipitor) tablet 10 mg, 10 mg, oral, Nightly, Froylan Collins MD, 10 mg at 04/03/24 2108    cephalexin (Keflex) capsule 500 mg, 500 mg, oral, TID, Froylan Collins MD    cyanocobalamin (Vitamin B-12) tablet 1,000 mcg, 1,000 mcg, oral, Daily, Froylan Collins MD, 1,000 mcg at 04/04/24 0940    dextrose 50 % injection 12.5 g, 12.5 g, intravenous, q15 min PRN, Froylan Collins MD    dextrose 50 % injection 25 g, 25 g, intravenous, q15 min PRN, Froylan Collins MD    docusate sodium (Colace) capsule 100 mg, 100 mg, oral, BID, Tim Alcala MD, 100 mg at 04/04/24 0821    glucagon (Glucagen) injection 1 mg, 1 mg, intramuscular, q15 min PRN, Froylan Collins MD    glucagon (Glucagen) injection 1 mg, 1 mg, intramuscular, q15 min PRN, Froylan Collins MD    [MAR Hold] guaiFENesin (Mucinex) 12 hr tablet 600 mg, 600 mg, oral, BID PRN, Froylan Collins MD    [Held by provider] hydrALAZINE (Apresoline) tablet 25 mg, 25 mg, oral, BID, Forylan Collins MD, 25 mg at 04/03/24 0818    HYDROcodone-acetaminophen (Norco) 5-325 mg per tablet 1 tablet, 1 tablet, oral, q4h PRN, Tim Alcala MD, 1 tablet at 04/02/24 0932    HYDROmorphone (Dilaudid) injection 0.5 mg, 0.5 mg, intravenous, q4h PRN, Tim Alcala MD    hydroxychloroquine (Plaquenil) tablet 200 mg, 200 mg, oral, BID with meals, Froylan Collins MD, 200 mg at 04/04/24 0825    insulin lispro (HumaLOG) injection 0-5 Units, 0-5 Units, subcutaneous, TID with meals, Froylan Collins MD, 2 Units at 04/03/24 1638    isosorbide mononitrate ER (Imdur) 24 hr tablet 30 mg, 30 mg, oral, Daily, Froylan Collins MD    [Held by provider] losartan (Cozaar) tablet 100 mg, 100 mg, oral, Daily, Froylan Clolins MD    [MAR Hold] magnesium hydroxide (Milk of Magnesia) 400 mg/5 mL suspension 30 mL, 30 mL, oral, Nightly PRN, Froylan Collins MD    magnesium  oxide (Mag-Ox) tablet 400 mg, 400 mg, oral, Daily, Froylan Collins MD, 400 mg at 04/04/24 1104    metFORMIN (Glucophage) tablet 500 mg, 500 mg, oral, BID with meals, Froylan Collins MD, 500 mg at 04/04/24 0821    [MAR Hold] morphine injection 2 mg, 2 mg, intravenous, q4h PRN, Froylan Collins MD    naloxone (Narcan) injection 0.2 mg, 0.2 mg, intravenous, q5 min PRN, Tim Alcala MD    naloxone (Narcan) injection 0.2 mg, 0.2 mg, intravenous, q5 min PRN, Tim Alcala MD    oxygen (O2) therapy, 2 L/min, inhalation, Continuous, Tim Alcala MD, Stopped at 04/01/24 1345    polyethylene glycol (Glycolax, Miralax) packet 17 g, 17 g, oral, Daily, Tim Alcala MD, 17 g at 04/04/24 0821    potassium chloride CR (Klor-Con M20) ER tablet 20 mEq, 20 mEq, oral, BID, Froylan Collins MD, 20 mEq at 04/04/24 1104    psyllium (Metamucil) 3.4 gram packet 1 packet, 1 packet, oral, Daily, Tim Alcala MD, 1 packet at 04/04/24 0821    SITagliptin phosphate (Januvia) tablet 100 mg, 100 mg, oral, Daily, Froylan Collins MD, 100 mg at 04/04/24 0821    [MAR Hold] traMADol (Ultram) tablet 50 mg, 50 mg, oral, q8h PRN, Froylan Collins MD, 50 mg at 04/01/24 0612                                Assessment/Plan   Principal Problem:    Closed displaced intertrochanteric fracture of right femur, initial encounter (CMS/[facility])  Active Problems:    Chronic obstructive pulmonary disease (CMS/formerly [facility])  S/p right trochanteric nail  HTN  afib  DM  dementia  Chronic mild normocytic anemia  Post op iron deficient anemia combined with anemia of acute blood loss and chronic disease anemia,   Venofer 200mg IV X3  Lab to microsample blood draws-signage placed on door  Monitor H/H   RBC transfusion as needed to maintain Hgb over 7           Arely Lester, APRN-CNP

## 2024-04-04 NOTE — PROGRESS NOTES
Patient accepted at [facility]. Bed available today 04/04.     Discharge Plan; [facility] when medically clear. Patient does not need a precert.     Louise Mcconnell RN

## 2024-04-04 NOTE — PROGRESS NOTES
Occupational Therapy    OT Treatment    Patient Name: Estela Carnes  MRN: [id number]  Today's Date: 4/4/2024  Time Calculation  Start Time: 1040  Stop Time: 1104  Time Calculation (min): 24 min         Assessment:  OT Assessment: Tolerated session fairly with increased cues required for safety. Pt demonstrating decreased functional tolerance this date, however improved overall arousal. Pt would benefit from continued skilled OT services to improve strength and fucntional tolerance to increase independence with ADL tasks  End of Session Communication: Bedside nurse  End of Session Patient Position: Bed, 3 rail up, Alarm on  OT Assessment Results: Decreased ADL status, Decreased upper extremity strength, Decreased safe judgment during ADL, Decreased cognition, Decreased endurance, Visual deficit, Decreased fine motor control, Decreased functional mobility, Decreased gross motor control, Decreased trunk control for functional activities  Plan:  Treatment Interventions: ADL retraining, Functional transfer training, UE strengthening/ROM, Endurance training, Patient/family training, Equipment evaluation/education, Neuromuscular reeducation, Compensatory technique education, Cognitive reorientation  OT Frequency: 5 times per week  OT Discharge Recommendations: Moderate intensity level of continued care  Equipment Recommended upon Discharge: Wheeled walker  OT Recommended Transfer Status: Maximum assist, Assist of 2  OT - OK to Discharge: Yes  Treatment Interventions: ADL retraining, Functional transfer training, UE strengthening/ROM, Endurance training, Patient/family training, Equipment evaluation/education, Neuromuscular reeducation, Compensatory technique education, Cognitive reorientation    Subjective   Previous Visit Info:  OT Last Visit  OT Received On: 04/04/24  General:  General  Family/Caregiver Present: Yes  Caregiver Feedback: multiple family members present at beginning of session  Co-Treatment:  PT  Co-Treatment Reason: two-person skilled assist for safe patient handling  Prior to Session Communication: Bedside nurse  Patient Position Received: Bed, 3 rail up, Alarm off, caregiver present  General Comment: Cleared for therapy per RN. Pt supine in bed upon arrival, more alert this date but pleasantly confused and unable to state who the visitors in the room were.  Precautions:  Hearing/Visual Limitations: wears glasses; mild [place]  LE Weight Bearing Status: Right Partial Weight Bearing  Medical Precautions: Fall precautions  Precautions Comment: Educated on PWB status with pt expressing understanding, with poor carryover  Vital Signs:  Vital Signs  BP: 157/73  Pain:  Pain Assessment  Pain Assessment: Goodman-Baker FACES  Pain Score: 4  Pain Type: Acute pain, Surgical pain  Pain Location: Hip  Pain Orientation: Right    Objective    Cognition:  Cognition  Overall Cognitive Status: Impaired  Orientation Level: Disoriented to situation, Disoriented to time  Following Commands: Follows one step commands with repetition  Safety Judgment: Decreased awareness of need for safety precautions  Cognition Comments: Pleasantly confused throughout session with pt unable to state the reason she is in the hospital or who her family members present were  Insight: Severe    Bed Mobility/Transfers: Bed Mobility  Bed Mobility: Yes  Bed Mobility 1  Bed Mobility 1: Supine to sitting  Level of Assistance 1: Moderate assistance (x2)  Bed Mobility Comments 1: Assist with BLE advancement off EOB and trunk elevation with increased cues for hand placement on bed rail for UE support with pt continuing to reach for therapist. Assist to scoot hips forward with use of drawsheet  Bed Mobility 2  Bed Mobility  2: Sitting to supine  Level of Assistance 2: Maximum assistance (x2)  Bed Mobility Comments 2: assist to lift BLE on bed and for trunk control. Dependent to boost to HOB with use of drawsheet    Transfers  Transfer: Yes  Transfer  1  Technique 1: Sit to stand, Stand to sit  Transfer Device 1: Walker  Transfer Level of Assistance 1: Maximum assistance, +2, Minimal verbal cues  Trials/Comments 1: sit>stand x2 with assist to lift hips from EOB with cues for proper hand placement and postural alignment with pt unable to demonstrate erect posture, requesting to return to seated after short period. Once seated pt expressing increased dizziness and lightheadedness, requesting to return to supine.    Standing Balance:  Static Standing Balance  Static Standing-Level of Assistance: Maximum assistance (x2)  Static Standing-Comment/Number of Minutes: poor balance with increased cues for postural alignment with poor carryover    Therapy/Activity: Therapeutic Exercise  Therapeutic Exercise Performed: Yes  Therapeutic Exercise Activity 1: participated in AROM BUE exercises 4x10 in all planes to improve strength and functional tolerance to increase independence with transfer tasks with cues provided for proper muscle recruitement    Outcome Measures:[facility] Daily Activity  Putting on and taking off regular lower body clothing: Total  Bathing (including washing, rinsing, drying): A lot  Putting on and taking off regular upper body clothing: A little  Toileting, which includes using toilet, bedpan or urinal: A lot  Taking care of personal grooming such as brushing teeth: A little  Eating Meals: A little  Daily Activity - Total Score: 14    Education Documentation  Precautions, taught by RAQUEL Price at 4/4/2024 12:11 PM.  Learner: Patient  Readiness: Acceptance  Method: Explanation  Response: Verbalizes Understanding, Needs Reinforcement    Home Exercise Program, taught by RAQUEL Price at 4/4/2024 12:11 PM.  Learner: Patient  Readiness: Acceptance  Method: Explanation  Response: Verbalizes Understanding, Needs Reinforcement    ADL Training, taught by RAQUEL Price at 4/4/2024 12:11 PM.  Learner: Patient  Readiness:  Acceptance  Method: Explanation  Response: Verbalizes Understanding, Needs Reinforcement    Education Comments  No comments found.      Problem: OT Goals  Goal: Pt will complete all grooming tasks with min A.  Outcome: Progressing  Goal: Pt will complete UB dressing/bathing with min A.  Outcome: Progressing  Goal: Pt will complete all toileting tasks with mod A.  Outcome: Progressing  Goal: Pt will participate in BUE exercises in order to enhance ADL's and functional transfers.  Outcome: Progressing     Problem: OT Goals  Goal: Pt will complete all functional transfers and mobility with mod A using a RW while maintaining 25% WB RLE.  Outcome: Not Progressing

## 2024-04-05 VITALS
BODY MASS INDEX: 23.51 KG/M2 | HEART RATE: 74 BPM | RESPIRATION RATE: 24 BRPM | WEIGHT: 141.09 LBS | TEMPERATURE: 97.9 F | HEIGHT: 65 IN | DIASTOLIC BLOOD PRESSURE: 63 MMHG | OXYGEN SATURATION: 98 % | SYSTOLIC BLOOD PRESSURE: 134 MMHG

## 2024-04-05 PROBLEM — J44.9 CHRONIC OBSTRUCTIVE PULMONARY DISEASE (MULTI): Status: RESOLVED | Noted: 2024-04-01 | Resolved: 2024-04-05

## 2024-04-05 LAB
ANION GAP SERPL CALC-SCNC: 8 MMOL/L
BUN SERPL-MCNC: 11 MG/DL (ref 8–25)
CALCIUM SERPL-MCNC: 8.4 MG/DL (ref 8.5–10.4)
CHLORIDE SERPL-SCNC: 105 MMOL/L (ref 97–107)
CO2 SERPL-SCNC: 26 MMOL/L (ref 24–31)
CREAT SERPL-MCNC: 0.4 MG/DL (ref 0.4–1.6)
EGFRCR SERPLBLD CKD-EPI 2021: >90 ML/MIN/1.73M*2
ERYTHROCYTE [DISTWIDTH] IN BLOOD BY AUTOMATED COUNT: 13.4 % (ref 11.5–14.5)
GLUCOSE BLD MANUAL STRIP-MCNC: 167 MG/DL (ref 74–99)
GLUCOSE BLD MANUAL STRIP-MCNC: 229 MG/DL (ref 74–99)
GLUCOSE SERPL-MCNC: 168 MG/DL (ref 65–99)
HCT VFR BLD AUTO: 27.4 % (ref 36–46)
HGB BLD-MCNC: 8.5 G/DL (ref 12–16)
MAGNESIUM SERPL-MCNC: 1.6 MG/DL (ref 1.6–3.1)
MCH RBC QN AUTO: 26.2 PG (ref 26–34)
MCHC RBC AUTO-ENTMCNC: 31 G/DL (ref 32–36)
MCV RBC AUTO: 85 FL (ref 80–100)
NRBC BLD-RTO: 0.3 /100 WBCS (ref 0–0)
PLATELET # BLD AUTO: 293 X10*3/UL (ref 150–450)
POTASSIUM SERPL-SCNC: 3.9 MMOL/L (ref 3.4–5.1)
RBC # BLD AUTO: 3.24 X10*6/UL (ref 4–5.2)
SODIUM SERPL-SCNC: 139 MMOL/L (ref 133–145)
WBC # BLD AUTO: 12.8 X10*3/UL (ref 4.4–11.3)

## 2024-04-05 PROCEDURE — 97530 THERAPEUTIC ACTIVITIES: CPT | Mod: GP

## 2024-04-05 PROCEDURE — 97530 THERAPEUTIC ACTIVITIES: CPT | Mod: GO,CO

## 2024-04-05 PROCEDURE — 82947 ASSAY GLUCOSE BLOOD QUANT: CPT

## 2024-04-05 PROCEDURE — 2500000001 HC RX 250 WO HCPCS SELF ADMINISTERED DRUGS (ALT 637 FOR MEDICARE OP): Performed by: FAMILY MEDICINE

## 2024-04-05 PROCEDURE — 97535 SELF CARE MNGMENT TRAINING: CPT | Mod: GO,CO

## 2024-04-05 PROCEDURE — 36415 COLL VENOUS BLD VENIPUNCTURE: CPT | Performed by: FAMILY MEDICINE

## 2024-04-05 PROCEDURE — 85027 COMPLETE CBC AUTOMATED: CPT | Performed by: FAMILY MEDICINE

## 2024-04-05 PROCEDURE — 2500000002 HC RX 250 W HCPCS SELF ADMINISTERED DRUGS (ALT 637 FOR MEDICARE OP, ALT 636 FOR OP/ED): Mod: MUE | Performed by: FAMILY MEDICINE

## 2024-04-05 PROCEDURE — 97110 THERAPEUTIC EXERCISES: CPT | Mod: GP

## 2024-04-05 PROCEDURE — 80048 BASIC METABOLIC PNL TOTAL CA: CPT | Performed by: FAMILY MEDICINE

## 2024-04-05 PROCEDURE — 83735 ASSAY OF MAGNESIUM: CPT | Performed by: FAMILY MEDICINE

## 2024-04-05 PROCEDURE — 2500000004 HC RX 250 GENERAL PHARMACY W/ HCPCS (ALT 636 FOR OP/ED): Performed by: FAMILY MEDICINE

## 2024-04-05 RX ORDER — CEPHALEXIN 500 MG/1
500 CAPSULE ORAL 3 TIMES DAILY
Qty: 12 CAPSULE | Refills: 0
Start: 2024-04-05 | End: 2024-04-09

## 2024-04-05 RX ORDER — AMLODIPINE BESYLATE 5 MG/1
5 TABLET ORAL DAILY
Status: DISCONTINUED | OUTPATIENT
Start: 2024-04-06 | End: 2024-04-05 | Stop reason: HOSPADM

## 2024-04-05 RX ORDER — POLYETHYLENE GLYCOL 3350 17 G/17G
17 POWDER, FOR SOLUTION ORAL DAILY PRN
Start: 2024-04-05

## 2024-04-05 RX ORDER — METOPROLOL SUCCINATE 25 MG/1
25 TABLET, EXTENDED RELEASE ORAL DAILY
Start: 2024-04-06

## 2024-04-05 RX ORDER — LANOLIN ALCOHOL/MO/W.PET/CERES
400 CREAM (GRAM) TOPICAL DAILY
Start: 2024-04-06

## 2024-04-05 RX ORDER — AMLODIPINE BESYLATE 5 MG/1
5 TABLET ORAL DAILY
Start: 2024-04-06

## 2024-04-05 RX ORDER — POLYETHYLENE GLYCOL 3350 17 G/17G
17 POWDER, FOR SOLUTION ORAL DAILY PRN
Status: DISCONTINUED | OUTPATIENT
Start: 2024-04-05 | End: 2024-04-05 | Stop reason: HOSPADM

## 2024-04-05 RX ADMIN — APIXABAN 2.5 MG: 2.5 TABLET, FILM COATED ORAL at 09:45

## 2024-04-05 RX ADMIN — HYDROXYCHLOROQUINE SULFATE 200 MG: 200 TABLET ORAL at 09:47

## 2024-04-05 RX ADMIN — ACETAMINOPHEN 650 MG: 325 TABLET ORAL at 12:42

## 2024-04-05 RX ADMIN — Medication 400 MG: at 09:47

## 2024-04-05 RX ADMIN — SITAGLIPTIN 100 MG: 100 TABLET, FILM COATED ORAL at 09:47

## 2024-04-05 RX ADMIN — INSULIN LISPRO 2 UNITS: 100 INJECTION, SOLUTION INTRAVENOUS; SUBCUTANEOUS at 12:43

## 2024-04-05 RX ADMIN — METFORMIN HYDROCHLORIDE 500 MG: 500 TABLET ORAL at 09:47

## 2024-04-05 RX ADMIN — CYANOCOBALAMIN TAB 1000 MCG 1000 MCG: 1000 TAB at 09:45

## 2024-04-05 RX ADMIN — CEPHALEXIN 500 MG: 500 CAPSULE ORAL at 09:45

## 2024-04-05 RX ADMIN — CEPHALEXIN 500 MG: 500 CAPSULE ORAL at 14:49

## 2024-04-05 RX ADMIN — ISOSORBIDE MONONITRATE 30 MG: 30 TABLET, EXTENDED RELEASE ORAL at 09:46

## 2024-04-05 RX ADMIN — METOPROLOL SUCCINATE 25 MG: 25 TABLET, EXTENDED RELEASE ORAL at 09:47

## 2024-04-05 RX ADMIN — INSULIN LISPRO 1 UNITS: 100 INJECTION, SOLUTION INTRAVENOUS; SUBCUTANEOUS at 09:47

## 2024-04-05 ASSESSMENT — COGNITIVE AND FUNCTIONAL STATUS - GENERAL
TOILETING: TOTAL
MOVING FROM LYING ON BACK TO SITTING ON SIDE OF FLAT BED WITH BEDRAILS: TOTAL
STANDING UP FROM CHAIR USING ARMS: A LOT
MOVING FROM LYING ON BACK TO SITTING ON SIDE OF FLAT BED WITH BEDRAILS: A LITTLE
TOILETING: A LOT
DRESSING REGULAR LOWER BODY CLOTHING: TOTAL
CLIMB 3 TO 5 STEPS WITH RAILING: A LOT
MOVING TO AND FROM BED TO CHAIR: TOTAL
WALKING IN HOSPITAL ROOM: TOTAL
HELP NEEDED FOR BATHING: A LOT
MOVING TO AND FROM BED TO CHAIR: TOTAL
WALKING IN HOSPITAL ROOM: A LOT
DRESSING REGULAR LOWER BODY CLOTHING: A LOT
WALKING IN HOSPITAL ROOM: TOTAL
STANDING UP FROM CHAIR USING ARMS: TOTAL
EATING MEALS: A LITTLE
DAILY ACTIVITIY SCORE: 13
STANDING UP FROM CHAIR USING ARMS: TOTAL
PERSONAL GROOMING: A LOT
DRESSING REGULAR UPPER BODY CLOTHING: A LITTLE
TURNING FROM BACK TO SIDE WHILE IN FLAT BAD: TOTAL
HELP NEEDED FOR BATHING: A LOT
PERSONAL GROOMING: A LITTLE
HELP NEEDED FOR BATHING: A LOT
MOBILITY SCORE: 13
TURNING FROM BACK TO SIDE WHILE IN FLAT BAD: TOTAL
MOBILITY SCORE: 6
EATING MEALS: A LITTLE
MOBILITY SCORE: 6
CLIMB 3 TO 5 STEPS WITH RAILING: TOTAL
DRESSING REGULAR LOWER BODY CLOTHING: TOTAL
TURNING FROM BACK TO SIDE WHILE IN FLAT BAD: A LOT
CLIMB 3 TO 5 STEPS WITH RAILING: TOTAL
DAILY ACTIVITIY SCORE: 13
DRESSING REGULAR UPPER BODY CLOTHING: A LOT
MOVING FROM LYING ON BACK TO SITTING ON SIDE OF FLAT BED WITH BEDRAILS: TOTAL
MOVING TO AND FROM BED TO CHAIR: A LOT

## 2024-04-05 ASSESSMENT — ACTIVITIES OF DAILY LIVING (ADL): HOME_MANAGEMENT_TIME_ENTRY: 12

## 2024-04-05 ASSESSMENT — PAIN SCALES - GENERAL
PAINLEVEL_OUTOF10: 0 - NO PAIN
PAINLEVEL_OUTOF10: 4

## 2024-04-05 ASSESSMENT — PAIN - FUNCTIONAL ASSESSMENT
PAIN_FUNCTIONAL_ASSESSMENT: WONG-BAKER FACES
PAIN_FUNCTIONAL_ASSESSMENT: FLACC (FACE, LEGS, ACTIVITY, CRY, CONSOLABILITY)

## 2024-04-05 ASSESSMENT — PAIN SCALES - WONG BAKER: WONGBAKER_NUMERICALRESPONSE: NO HURT

## 2024-04-05 NOTE — PROGRESS NOTES
04/05/24 1119   Discharge Planning   Home or Post Acute Services Post acute facilities (Rehab/SNF/etc)   Type of Post Acute Facility Services Skilled nursing;Rehab   Patient expects to be discharged to: [facility]   Does the patient need discharge transport arranged? Yes   [facility] coordination needed? Yes   Has discharge transport been arranged? Yes   What day is the transport expected? 04/05/24   What time is the transport expected? 1500     Patient medically cleared for discharge, signed/certified goldenrod received.  7000 completed in [facility]ENS.  [facility] updated on planned discharge today, facility confirmed they are willing and able to accept at 3pm today.  Transportation arranged via [employer]    Referral updated with final discharge orders, AVS, 7000, and transportation time attached and sent to [facility].   Bedside nurse also updated.    Call placed to patients spouse, spoke to son and updated on discharge plan    DC plan secure

## 2024-04-05 NOTE — PROGRESS NOTES
Physical Therapy    Physical Therapy Treatment    Patient Name: Estela Carnes  MRN: [id number]  Today's Date: 4/5/2024  Time Calculation  Start Time: 1024  Stop Time: 1048  Time Calculation (min): 24 min       Assessment/Plan   PT Assessment  PT Assessment Results: Decreased strength, Decreased range of motion, Decreased endurance, Impaired balance, Decreased mobility, Decreased coordination, Decreased cognition, Pain  Rehab Prognosis: Good  Evaluation/Treatment Tolerance: Patient limited by fatigue  End of Session Communication: Bedside nurse  Assessment Comment: pt participatory and required MAX A x 2 for transfers; pt limited by  lightheadedness and elevated BP  End of Session Patient Position: Bed, 3 rail up, Alarm on (call button in reach)  PT Plan  Inpatient/Swing Bed or Outpatient: Inpatient  PT Plan  Treatment/Interventions: Bed mobility, Transfer training, Gait training, Balance training, Strengthening, Range of motion, Therapeutic exercise, Endurance training  PT Plan: Skilled PT  PT Frequency: 6 times per week  PT Discharge Recommendations: Moderate intensity level of continued care  Equipment Recommended upon Discharge: Wheeled walker  PT Recommended Transfer Status: Assist x2  PT - OK to Discharge: Yes      General Visit Information:   PT  Visit  PT Received On: 04/05/24  General  Reason for Referral: pt sustained a fall resulting in R hip fx; s/p ORIF 4/1/24 by Dr. Alcala; impaired cognition, ADL's/mobility  Past Medical History Relevant to Rehab: dementia, anemia, anxiety,  vertigo, HLD, GERD, DM, paroxysmal a-fib, mac degen, [place], L5 fx, RA  Co-Treatment: OT  Co-Treatment Reason: two-person skilled assist for safe patient handling  Prior to Session Communication: Bedside nurse  Patient Position Received: Bed, 3 rail up, Alarm on  General Comment: pt alert and talkative; RN cleared pt for OOB    Subjective   Precautions:  Precautions  Hearing/Visual Limitations: wears glasses; mild [place]  LE Weight  "Bearing Status:  (25% WB on R LE)  Medical Precautions: Fall precautions  Precautions Comment: reviewed safety techniques during mobiity    Vital Signs:  Vital Signs  BP:  (154/126 sitting on EOB; pt c/o lightheadedness; RN notified)  MAP (mmHg): 132    Objective   Pain:  Pain Assessment  Pain Assessment: FLACC (Face, Legs, Activity, Cry, Consolability)  Pain Score:  (4/10 R hip during transfers; RN to medicate)    Cognition:  Cognition  Overall Cognitive Status: Impaired    Activity Tolerance:  Activity Tolerance  Endurance:  (FAIR+ activity tolerance)    Treatments:  Therapeutic Exercise  Therapeutic Exercise Performed:  (B LE AAROM/AROM therex:  AP, GS, HS, ABD x 10 reps ; VC to stay on task)       Bed Mobility:  supine <-> sit with MAX A x 1-2 for trunk, B LE and scooting; initially FAIR sitting balance on EOB; pt able to regain trunk in midline; pt reported \"my head hurts\" while sitting on EOB. BP taken (154/126); transferred ot back to supine; RN notified    Other Activity Performed:  pt noted to be incontinent of stool upon arrival; pt rolled toward left side with MOD/MAX A. pt grasped bed rail but occasionally became resistive due to confusion; RAQUEL to perform blake-care;    Outcome Measures:  [facility] Basic Mobility  Turning from your back to your side while in a flat bed without using bedrails: Total  Moving from lying on your back to sitting on the side of a flat bed without using bedrails: Total  Moving to and from bed to chair (including a wheelchair): Total  Standing up from a chair using your arms (e.g. wheelchair or bedside chair): Total  To walk in hospital room: Total  Climbing 3-5 steps with railing: Total  Basic Mobility - Total Score: 6        Encounter Problems       Encounter Problems (Active)       Mobility       STG - Patient will ambulate 60' x 1 using rolling walker with CGA (Not Progressing)       Start:  04/02/24    Expected End:  04/16/24               PT Transfers       STG - Patient to " transfer to and from sit to supine with CGA (Not Progressing)       Start:  04/02/24    Expected End:  04/16/24            STG - Patient will transfer sit to and from stand with CGA (Not Progressing)       Start:  04/02/24    Expected End:  04/16/24               Pain - Adult

## 2024-04-05 NOTE — NURSING NOTE
Assumed care of patient. Patient is resting in bed with no needs at this time. Bed alarm is in place along with call light in reach. Will continue to monitor.

## 2024-04-05 NOTE — DISCHARGE SUMMARY
Discharge Diagnosis  Closed displaced intertrochanteric fracture of right femur, initial encounter (CMS/[facility]).  Diabetes mellitus type 2.  UTI.  Hypertension.  Gait impairment    Issues Requiring Follow-Up  Orthopedic surgeon, Dr. Tubbs in 3 weeks    Discharge Meds     Your medication list        START taking these medications        Instructions Last Dose Given Next Dose Due   amLODIPine 5 mg tablet  Commonly known as: Norvasc  Start taking on: April 6, 2024      Take 1 tablet (5 mg) by mouth once daily. Do not start before April 6, 2024.       apixaban 2.5 mg tablet  Commonly known as: Eliquis      Take 1 tablet (2.5 mg) by mouth 2 times a day.       cephalexin 500 mg capsule  Commonly known as: Keflex      Take 1 capsule (500 mg) by mouth 3 times a day for 4 days.       magnesium oxide 400 mg (241.3 mg magnesium) tablet  Commonly known as: Mag-Ox  Start taking on: April 6, 2024      Take 1 tablet (400 mg) by mouth once daily. Do not start before April 6, 2024.       metoprolol succinate XL 25 mg 24 hr tablet  Commonly known as: Toprol-XL  Start taking on: April 6, 2024      Take 1 tablet (25 mg) by mouth once daily. Do not crush or chew. Do not start before April 6, 2024.       polyethylene glycol 17 gram packet  Commonly known as: Glycolax, Miralax      Take 17 g by mouth once daily as needed (Constipation).       psyllium 3.4 gram packet  Commonly known as: Metamucil  Start taking on: April 6, 2024      Take 1 packet by mouth once daily. Do not start before April 6, 2024.       SITagliptin phosphate 100 mg tablet  Commonly known as: Januvia  Start taking on: April 6, 2024      Take 1 tablet (100 mg) by mouth once daily. Do not start before April 6, 2024.              CONTINUE taking these medications        Instructions Last Dose Given Next Dose Due   acetaminophen 325 mg tablet  Commonly known as: Tylenol           aspirin 81 mg EC tablet           atorvastatin 10 mg tablet  Commonly known as: Lipitor            cholecalciferol 5,000 Units tablet  Commonly known as: Vitamin D-3           isosorbide mononitrate ER 30 mg 24 hr tablet  Commonly known as: Imdur           metFORMIN  mg 24 hr tablet  Commonly known as: Glucophage-XR           PlaqueniL 200 mg tablet  Generic drug: hydroxychloroquine           PRESERVISION AREDS-2 ORAL           Vitamin B-12 1,000 mcg tablet  Generic drug: cyanocobalamin                  STOP taking these medications      chlorhexidine 0.12 % solution  Commonly known as: Peridex        guaiFENesin 100 mg/5 mL syrup  Commonly known as: Robitussin        guaiFENesin 600 mg 12 hr tablet  Commonly known as: Mucinex        hydrALAZINE 25 mg tablet  Commonly known as: Apresoline        losartan 100 mg tablet  Commonly known as: Cozaar        magnesium hydroxide 400 mg/5 mL suspension  Commonly known as: Milk of Magnesia        traMADol 50 mg tablet  Commonly known as: Ultram                  Where to Get Your Medications        Information about where to get these medications is not yet available    Ask your nurse or doctor about these medications  amLODIPine 5 mg tablet  apixaban 2.5 mg tablet  cephalexin 500 mg capsule  magnesium oxide 400 mg (241.3 mg magnesium) tablet  metoprolol succinate XL 25 mg 24 hr tablet  polyethylene glycol 17 gram packet  psyllium 3.4 gram packet  SITagliptin phosphate 100 mg tablet         Test Results Pending At Discharge  Pending Labs       No current pending labs.            Hospital Course   Estela Carnes is a 83 y.o. female with past medical history significant for diabetes mellitus type 2, osteoarthritis, rheumatoid arthritis, hypertension, remote history of L5 fracture, macular degeneration, osteopenia, dementia, atrial fibrillation presenting with several hours history of a fall that she sustained at [facility].  The nursing staff found her on the floor and complaining of right hip pain.  On arrival diagnosed with a right intertrochanteric  fracture.  Admitted to a regular nursing floor for further management and operative treatment.  Recently she was treated at the [facility] for aspiration pneumonia.  She was cleared for surgery and underwent right hip ORIF by licha without complications.  Postoperative course was unremarkable.  Pain was fairly controlled.  1 day after surgery she became hypotensive requiring IV fluids.  Today she is feeling a lot better.  Menard catheter was removed and she was able to void.  PT/OT suggested a skilled nursing facility on discharge.  And she is being discharged in satisfactory condition.  Also treated for UTI with Rocephin 1 g IV every 24 hours and then switched to Keflex 500 mg p.o. 3 times daily x 5 days    Pertinent Physical Exam At Time of Discharge  Physical Exam  General-awake, alert, oriented x2  Lung-clear to auscultation bilaterally  Heart-regular sinus rhythm and rhythm, S1 and S2 audible,no murmurs  Abdomen- soft, nontender, nondistended, good bowel sounds, no organomegaly, no mass, bowel sounds are present  Extremities-no clubbing, no cyanosis, no edema.  Right hip without edema, mild tenderness to palpation over operative site  Wound-clean/dry/intact  Outpatient Follow-Up  No future appointments.      Froylan Collins MD

## 2024-04-05 NOTE — PROGRESS NOTES
Occupational Therapy    OT Treatment    Patient Name: Estela Carnes  MRN: [id number]  Today's Date: 4/5/2024  Time Calculation  Start Time: 1029  Stop Time: 1052  Time Calculation (min): 23 min         Assessment:  OT Assessment: Tolerated session poorly d/t increased dizziness and confusion, having to return to supine and terminate session. Pt would benefit from continued skilled OT services to improve strength, balance, and functional tolerance to increase independence with ADL tasks  End of Session Communication: Bedside nurse  End of Session Patient Position: Bed, 3 rail up, Alarm on  OT Assessment Results: Decreased ADL status, Decreased upper extremity strength, Decreased safe judgment during ADL, Decreased cognition, Decreased endurance, Visual deficit, Decreased fine motor control, Decreased functional mobility, Decreased gross motor control, Decreased trunk control for functional activities  Plan:  Treatment Interventions: ADL retraining, Functional transfer training, UE strengthening/ROM, Endurance training, Patient/family training, Equipment evaluation/education, Neuromuscular reeducation, Compensatory technique education, Cognitive reorientation  OT Frequency: 5 times per week  OT Discharge Recommendations: Moderate intensity level of continued care  Equipment Recommended upon Discharge: Wheeled walker  OT Recommended Transfer Status: Maximum assist, Assist of 2  OT - OK to Discharge: Yes  Treatment Interventions: ADL retraining, Functional transfer training, UE strengthening/ROM, Endurance training, Patient/family training, Equipment evaluation/education, Neuromuscular reeducation, Compensatory technique education, Cognitive reorientation    Subjective   Previous Visit Info:  OT Last Visit  OT Received On: 04/05/24  General:  General  Co-Treatment: PT  Co-Treatment Reason: two-person skilled assist for safe patient handling  Prior to Session Communication: Bedside nurse  Patient Position Received: Bed,  3 rail up, Alarm off, caregiver present  General Comment: Cleared for therapy per RN. Pt supine in bed upon arrival and agreeable to tx with encouragement  Precautions:  Hearing/Visual Limitations: wears glasses; mild [place]  LE Weight Bearing Status: Right Partial Weight Bearing  Medical Precautions: Fall precautions  Vital Signs:  Vital Signs  BP: (!) 154/126 (expressing increased dizziness EOB, returned to supine d/t BP with RN aware)  BP Location: Left arm  BP Method: Automatic  Patient Position: Sitting  Pain:  Pain Assessment  Pain Assessment: Goodman-Baker FACES  Pain Score: 4  Pain Type: Acute pain, Surgical pain  Pain Location: Hip  Pain Orientation: Right    Objective    Cognition:  Cognition  Overall Cognitive Status: Impaired  Orientation Level: Disoriented to situation, Disoriented to place (pt expressing she is at home)  Following Commands: Follows one step commands with repetition  Safety Judgment: Decreased awareness of need for assistance  Cognition Comments: Confused throughout tx, requesting therapist to call  repeatedly    Activities of Daily Living:    Toileting  Toileting Level of Assistance: Dependent  Where Assessed: Bed level  Toileting Comments: demonstrating bowel incontinence upon arrival, dependent blake hygiene    Bed Mobility/Transfers: Bed Mobility  Bed Mobility: Yes  Bed Mobility 1  Bed Mobility 1: Rolling right, Rolling left  Level of Assistance 1: Maximum assistance  Bed Mobility Comments 1: assist to roll L/R for rear blake hygiene following BM with cues for hand placement on bed rail for UE support  Bed Mobility 2  Bed Mobility  2: Sitting to supine  Level of Assistance 2: Maximum assistance  Bed Mobility Comments 2: assist with BLE advancement off EOB with trunk elevation and assist to scoot hips to EOB with use of drawsheet. Pt required increased cues for hand placement once seating for trunk support. Tolerated sitting ~3 min, expressing increased dizziness with increased BP,  having to return to supine  Bed Mobility 3  Bed Mobility 3: Sitting to supine  Level of Assistance 3: Maximum assistance, Moderate verbal cues (x2)  Bed Mobility Comments 3: assist to lift BLE on bed and for trunk control. Dependent to boost to HOB with use of drawsheet    Sitting Balance:  Static Sitting Balance  Static Sitting-Level of Assistance: Contact guard, Close supervision  Static Sitting-Comment/Number of Minutes: assist initially, progressing to S with cues for proper hand placement for trunk support.    Outcome Measures:[facility] Daily Activity  Putting on and taking off regular lower body clothing: Total  Bathing (including washing, rinsing, drying): A lot  Putting on and taking off regular upper body clothing: A little  Toileting, which includes using toilet, bedpan or urinal: Total  Taking care of personal grooming such as brushing teeth: A little  Eating Meals: A little  Daily Activity - Total Score: 13    Education Documentation  Precautions, taught by RAQUEL Price at 4/5/2024 11:59 AM.  Learner: Patient  Readiness: Acceptance  Method: Explanation  Response: Needs Reinforcement    Home Exercise Program, taught by RAQUEL Price at 4/5/2024 11:59 AM.  Learner: Patient  Readiness: Acceptance  Method: Explanation  Response: Needs Reinforcement    ADL Training, taught by RAQUEL Price at 4/5/2024 11:59 AM.  Learner: Patient  Readiness: Acceptance  Method: Explanation  Response: Needs Reinforcement      Problem: OT Goals  Goal: Pt will complete all grooming tasks with min A.  Outcome: Progressing  Goal: Pt will complete UB dressing/bathing with min A.  Outcome: Progressing  Goal: Pt will participate in BUE exercises in order to enhance ADL's and functional transfers.  Outcome: Progressing     Problem: OT Goals  Goal: Pt will complete all functional transfers and mobility with mod A using a RW while maintaining 25% WB RLE.  Outcome: Not Progressing  Goal: Pt will  complete all toileting tasks with mod A.  Outcome: Not Progressing

## 2024-04-05 NOTE — NURSING NOTE
Assumed care of pt. Patient is resting in bed. No needs at the moment. Bed alarm on and call light in reach. Will continue to monitor

## 2024-04-05 NOTE — CARE PLAN
The patient's goals for the shift include  sleep    The clinical goals for the shift include no pain or falls

## 2024-05-03 ENCOUNTER — NURSING HOME VISIT (OUTPATIENT)
Dept: POST ACUTE CARE | Facility: EXTERNAL LOCATION | Age: 84
End: 2024-05-03
Payer: MEDICARE

## 2024-05-03 DIAGNOSIS — F03.90 DEMENTIA WITHOUT BEHAVIORAL DISTURBANCE, PSYCHOTIC DISTURBANCE, MOOD DISTURBANCE, OR ANXIETY, UNSPECIFIED DEMENTIA SEVERITY, UNSPECIFIED DEMENTIA TYPE (MULTI): ICD-10-CM

## 2024-05-03 DIAGNOSIS — R29.6 RECURRENT FALLS: ICD-10-CM

## 2024-05-03 DIAGNOSIS — I10 ESSENTIAL HYPERTENSION: ICD-10-CM

## 2024-05-03 DIAGNOSIS — M15.9 PRIMARY OSTEOARTHRITIS INVOLVING MULTIPLE JOINTS: ICD-10-CM

## 2024-05-03 DIAGNOSIS — S72.141A CLOSED DISPLACED INTERTROCHANTERIC FRACTURE OF RIGHT FEMUR, INITIAL ENCOUNTER (MULTI): Primary | ICD-10-CM

## 2024-05-03 PROCEDURE — 99349 HOME/RES VST EST MOD MDM 40: CPT

## 2024-05-03 ASSESSMENT — PAIN SCALES - GENERAL: PAINLEVEL: 0-NO PAIN

## 2024-05-03 NOTE — LETTER
Patient: Estela Carnes  : 1940    Encounter Date: 2024    Subjective   Patient ID: Estela Carnes is a 83 y.o. female who is assisted living/ home patient being seen at [facility], and evaluated for a re-admission to facility.     HPI     Pt visited in apartment of assisted living. Pt with an unwitnessed fall in March, and fractured right femur. Pt had a surgical repair and was sent to rehab. Pt now returns to [facility], due to dementia unable to follow instructions of 25% weight bearing.  Pt lives with spouse with cognitive impairment, and is incorrectly assisting patient with transfers and ADL's per nursing staff. Pt utilizing a marah lift for transfers. Upon arrival to [facility], pt was found on the floor in front of wheelchair. Spouse states he was trying to get her closer to chair, and she fell out of w/c. Pt a 2 person assist up to recliner in living room. Denies injury. Pt visibly upset by fall. Pt admits to full ROM of all 4 extremities at baseline. Pt cannot recall how current fall occurred. Pt does not recall breaking right femur end of March, hospital stay or rehab. Pt denies headache, dizziness, congestion, fever, chills, and runny nose. Pt states appetite is good, denies difficulty with chewing and swallowing.     Pt denies chest pain and sob at rest/exertion. Pt denies voiding symptoms and constipation. Pt wheelchair bound at this time. Prior to fracture pt ambulating with a walker. Pt with recurrent falls since returning to AL due to dementia, and not remembering to press pendant for assistance. Pt with a fall in the bathroom recently when spouse was assisting patient. Reminded spouse to press pendant when pt is ready to transfer out of recliner to use bathroom or change positions. Spouse verbalized understanding. Pt is a poor historian, collateral information obtained from nursing.    *Medications reconciled into EPIC.   Current Outpatient Medications on File Prior to Visit    Medication Sig Dispense Refill   • acetaminophen (Tylenol) 325 mg tablet Take 2 tablets (650 mg) by mouth every 4 hours if needed for headaches, fever (temp greater than 38.0 C) or mild pain (1 - 3).     • amLODIPine (Norvasc) 5 mg tablet Take 1 tablet (5 mg) by mouth once daily. Do not start before April 6, 2024.     • apixaban (Eliquis) 2.5 mg tablet Take 1 tablet (2.5 mg) by mouth 2 times a day.     • aspirin 81 mg EC tablet Take 1 tablet (81 mg) by mouth 1 time.     • atorvastatin (Lipitor) 10 mg tablet Take 1 tablet (10 mg) by mouth once daily.     • cholecalciferol (Vitamin D-3) 5,000 Units tablet Take 1 capsule by mouth once daily.     • isosorbide mononitrate ER (Imdur) 30 mg 24 hr tablet Take 1 tablet (30 mg) by mouth once daily.     • magnesium oxide (Mag-Ox) 400 mg (241.3 mg magnesium) tablet Take 1 tablet (400 mg) by mouth once daily. Do not start before April 6, 2024.     • metFORMIN  mg 24 hr tablet Take 1 tablet (500 mg) by mouth 2 times a day with meals.     • metoprolol succinate XL (Toprol-XL) 25 mg 24 hr tablet Take 1 tablet (25 mg) by mouth once daily. Do not crush or chew. Do not start before April 6, 2024.     • PlaqueniL 200 mg tablet Take 1 tablet (200 mg) by mouth 2 times a day.     • polyethylene glycol (Glycolax, Miralax) 17 gram packet Take 17 g by mouth once daily as needed (Constipation).     • psyllium (Metamucil) 3.4 gram packet Take 1 packet by mouth once daily. Do not start before April 6, 2024.     • SITagliptin phosphate (Januvia) 100 mg tablet Take 1 tablet (100 mg) by mouth once daily. Do not start before April 6, 2024.     • vit C/E/Zn/coppr/lutein/zeaxan (PRESERVISION AREDS-2 ORAL) Take 1 tablet by mouth once daily.     • cyanocobalamin (Vitamin B-12) 1,000 mcg tablet Take 1 tablet (1,000 mcg) by mouth once daily.       No current facility-administered medications on file prior to visit.      Review of Systems   Constitutional:  Negative for chills, fatigue and fever.  "  HENT:  Negative for congestion and rhinorrhea.    Eyes:  Negative for visual disturbance.   Respiratory:  Negative for cough and shortness of breath.    Cardiovascular:  Negative for chest pain and leg swelling.   Gastrointestinal:  Negative for constipation, nausea and vomiting.   Genitourinary:  Negative for dysuria and frequency.   Musculoskeletal:  Positive for arthralgias, gait problem and myalgias.        25% weight bearing, marah lift, wheelchair bound.    Neurological:  Positive for weakness. Negative for dizziness, syncope, light-headedness and headaches.   Psychiatric/Behavioral:  Positive for confusion and decreased concentration. Negative for sleep disturbance. The patient is nervous/anxious.        Objective   /60 (BP Location: Left arm, Patient Position: Sitting, BP Cuff Size: Adult)   Pulse 78   Resp 16   Ht 1.651 m (5' 5\")   Wt 90.5 kg (199 lb 9.6 oz)   SpO2 99%   BMI 33.22 kg/m²     Lab Results   Component Value Date    GLUCOSE 168 (H) 04/05/2024    CALCIUM 8.4 (L) 04/05/2024     04/05/2024    K 3.9 04/05/2024    CO2 26 04/05/2024     04/05/2024    BUN 11 04/05/2024    CREATININE 0.40 04/05/2024      Lab Results   Component Value Date    WBC 12.8 (H) 04/05/2024    HGB 8.5 (L) 04/05/2024    HCT 27.4 (L) 04/05/2024    MCV 85 04/05/2024     04/05/2024        Physical Exam  Constitutional:       General: She is awake. She is not in acute distress.  HENT:      Head: Normocephalic.      Nose: Nose normal. No congestion or rhinorrhea.      Mouth/Throat:      Mouth: Mucous membranes are moist.   Eyes:      Conjunctiva/sclera: Conjunctivae normal.      Pupils: Pupils are equal, round, and reactive to light.   Cardiovascular:      Rate and Rhythm: Normal rate and regular rhythm.      Pulses: Normal pulses.      Heart sounds: Normal heart sounds.   Pulmonary:      Effort: Pulmonary effort is normal. No respiratory distress.      Breath sounds: Normal breath sounds. No wheezing " or rhonchi.   Abdominal:      General: Bowel sounds are normal.   Musculoskeletal:         General: No swelling. Normal range of motion.      Cervical back: Normal range of motion.      Right lower leg: No edema.      Left lower leg: No edema.   Skin:     General: Skin is warm.      Capillary Refill: Capillary refill takes less than 2 seconds.   Neurological:      Mental Status: She is alert. Mental status is at baseline.      Comments: Oriented x1   Psychiatric:         Attention and Perception: Attention normal.         Speech: Speech normal.         Cognition and Memory: Cognition is impaired. Memory is impaired.       Assessment/Plan   Diagnoses and all orders for this visit:  Closed displaced intertrochanteric fracture of right femur, initial encounter (Multi)  Comments:  Per d/c orders pt is 25% weight bearing, utilizing marah for transfers, due to dementia pt is non-compliant. Continue to work with PT/OT.  Dementia without behavioral disturbance, psychotic disturbance, mood disturbance, or anxiety, unspecified dementia severity, unspecified dementia type (Multi)  Comments:  Monitor for cognitive decline. Refer to ST to eval and treat.  Essential hypertension  Comments:  Blood pressure controlled.  Primary osteoarthritis involving multiple joints  Comments:  Pain controlled.  Recurrent falls  Comments:  Refer to PT to eval and treat, facility with fall interventions in place.            Electronically Signed By: JODY Calloway-CNP   5/5/24 10:49 PM

## 2024-05-05 VITALS
OXYGEN SATURATION: 99 % | DIASTOLIC BLOOD PRESSURE: 60 MMHG | HEART RATE: 78 BPM | SYSTOLIC BLOOD PRESSURE: 130 MMHG | RESPIRATION RATE: 16 BRPM | BODY MASS INDEX: 33.26 KG/M2 | HEIGHT: 65 IN | WEIGHT: 199.6 LBS

## 2024-05-05 PROBLEM — R29.6 FALL IN ELDERLY PATIENT: Status: ACTIVE | Noted: 2024-05-05

## 2024-05-05 PROBLEM — F03.90 UNSPECIFIED DEMENTIA, UNSPECIFIED SEVERITY, WITHOUT BEHAVIORAL DISTURBANCE, PSYCHOTIC DISTURBANCE, MOOD DISTURBANCE, AND ANXIETY (MULTI): Status: ACTIVE | Noted: 2024-05-05

## 2024-05-05 ASSESSMENT — ENCOUNTER SYMPTOMS
WEAKNESS: 1
HEADACHES: 0
COUGH: 0
RHINORRHEA: 0
FATIGUE: 0
DYSURIA: 0
LIGHT-HEADEDNESS: 0
MYALGIAS: 1
ARTHRALGIAS: 1
CONFUSION: 1
CONSTIPATION: 0
VOMITING: 0
NAUSEA: 0
NERVOUS/ANXIOUS: 1
DIZZINESS: 0
FEVER: 0
SLEEP DISTURBANCE: 0
FREQUENCY: 0
DECREASED CONCENTRATION: 1
CHILLS: 0
SHORTNESS OF BREATH: 0

## 2024-05-05 NOTE — PROGRESS NOTES
Subjective   Patient ID: Estela Carnes is a 83 y.o. female who is assisted living/ home patient being seen at [facility], and evaluated for a re-admission to facility.     HPI     Pt visited in apartment of assisted living. Pt with an unwitnessed fall in March, and fractured right femur. Pt had a surgical repair and was sent to rehab. Pt now returns to [facility], due to dementia unable to follow instructions of 25% weight bearing.  Pt lives with spouse with cognitive impairment, and is incorrectly assisting patient with transfers and ADL's per nursing staff. Pt utilizing a marah lift for transfers. Upon arrival to apartment, pt was found on the floor in front of wheelchair. Spouse states he was trying to get her closer to chair, and she fell out of w/c. Pt a 2 person assist up to recliner in living room. Denies injury. Pt visibly upset by fall. Pt admits to full ROM of all 4 extremities at baseline. Pt cannot recall how current fall occurred. Pt does not recall breaking right femur end of March, hospital stay or rehab. Pt denies headache, dizziness, congestion, fever, chills, and runny nose. Pt states appetite is good, denies difficulty with chewing and swallowing.     Pt denies chest pain and sob at rest/exertion. Pt denies voiding symptoms and constipation. Pt wheelchair bound at this time. Prior to fracture pt ambulating with a walker. Pt with recurrent falls since returning to AL due to dementia, and not remembering to press pendant for assistance. Pt with a fall in the bathroom recently when spouse was assisting patient. Reminded spouse to press pendant when pt is ready to transfer out of recliner to use bathroom or change positions. Spouse verbalized understanding. Pt is a poor historian, collateral information obtained from nursing.    *Medications reconciled into EPIC.   Current Outpatient Medications on File Prior to Visit   Medication Sig Dispense Refill    acetaminophen (Tylenol) 325 mg tablet  Take 2 tablets (650 mg) by mouth every 4 hours if needed for headaches, fever (temp greater than 38.0 C) or mild pain (1 - 3).      amLODIPine (Norvasc) 5 mg tablet Take 1 tablet (5 mg) by mouth once daily. Do not start before April 6, 2024.      apixaban (Eliquis) 2.5 mg tablet Take 1 tablet (2.5 mg) by mouth 2 times a day.      aspirin 81 mg EC tablet Take 1 tablet (81 mg) by mouth 1 time.      atorvastatin (Lipitor) 10 mg tablet Take 1 tablet (10 mg) by mouth once daily.      cholecalciferol (Vitamin D-3) 5,000 Units tablet Take 1 capsule by mouth once daily.      isosorbide mononitrate ER (Imdur) 30 mg 24 hr tablet Take 1 tablet (30 mg) by mouth once daily.      magnesium oxide (Mag-Ox) 400 mg (241.3 mg magnesium) tablet Take 1 tablet (400 mg) by mouth once daily. Do not start before April 6, 2024.      metFORMIN  mg 24 hr tablet Take 1 tablet (500 mg) by mouth 2 times a day with meals.      metoprolol succinate XL (Toprol-XL) 25 mg 24 hr tablet Take 1 tablet (25 mg) by mouth once daily. Do not crush or chew. Do not start before April 6, 2024.      PlaqueniL 200 mg tablet Take 1 tablet (200 mg) by mouth 2 times a day.      polyethylene glycol (Glycolax, Miralax) 17 gram packet Take 17 g by mouth once daily as needed (Constipation).      psyllium (Metamucil) 3.4 gram packet Take 1 packet by mouth once daily. Do not start before April 6, 2024.      SITagliptin phosphate (Januvia) 100 mg tablet Take 1 tablet (100 mg) by mouth once daily. Do not start before April 6, 2024.      vit C/E/Zn/coppr/lutein/zeaxan (PRESERVISION AREDS-2 ORAL) Take 1 tablet by mouth once daily.      cyanocobalamin (Vitamin B-12) 1,000 mcg tablet Take 1 tablet (1,000 mcg) by mouth once daily.       No current facility-administered medications on file prior to visit.      Review of Systems   Constitutional:  Negative for chills, fatigue and fever.   HENT:  Negative for congestion and rhinorrhea.    Eyes:  Negative for visual  "disturbance.   Respiratory:  Negative for cough and shortness of breath.    Cardiovascular:  Negative for chest pain and leg swelling.   Gastrointestinal:  Negative for constipation, nausea and vomiting.   Genitourinary:  Negative for dysuria and frequency.   Musculoskeletal:  Positive for arthralgias, gait problem and myalgias.        25% weight bearing, marah lift, wheelchair bound.    Neurological:  Positive for weakness. Negative for dizziness, syncope, light-headedness and headaches.   Psychiatric/Behavioral:  Positive for confusion and decreased concentration. Negative for sleep disturbance. The patient is nervous/anxious.        Objective   /60 (BP Location: Left arm, Patient Position: Sitting, BP Cuff Size: Adult)   Pulse 78   Resp 16   Ht 1.651 m (5' 5\")   Wt 90.5 kg (199 lb 9.6 oz)   SpO2 99%   BMI 33.22 kg/m²     Lab Results   Component Value Date    GLUCOSE 168 (H) 04/05/2024    CALCIUM 8.4 (L) 04/05/2024     04/05/2024    K 3.9 04/05/2024    CO2 26 04/05/2024     04/05/2024    BUN 11 04/05/2024    CREATININE 0.40 04/05/2024      Lab Results   Component Value Date    WBC 12.8 (H) 04/05/2024    HGB 8.5 (L) 04/05/2024    HCT 27.4 (L) 04/05/2024    MCV 85 04/05/2024     04/05/2024        Physical Exam  Constitutional:       General: She is awake. She is not in acute distress.  HENT:      Head: Normocephalic.      Nose: Nose normal. No congestion or rhinorrhea.      Mouth/Throat:      Mouth: Mucous membranes are moist.   Eyes:      Conjunctiva/sclera: Conjunctivae normal.      Pupils: Pupils are equal, round, and reactive to light.   Cardiovascular:      Rate and Rhythm: Normal rate and regular rhythm.      Pulses: Normal pulses.      Heart sounds: Normal heart sounds.   Pulmonary:      Effort: Pulmonary effort is normal. No respiratory distress.      Breath sounds: Normal breath sounds. No wheezing or rhonchi.   Abdominal:      General: Bowel sounds are normal. "   Musculoskeletal:         General: No swelling. Normal range of motion.      Cervical back: Normal range of motion.      Right lower leg: No edema.      Left lower leg: No edema.   Skin:     General: Skin is warm.      Capillary Refill: Capillary refill takes less than 2 seconds.   Neurological:      Mental Status: She is alert. Mental status is at baseline.      Comments: Oriented x1   Psychiatric:         Attention and Perception: Attention normal.         Speech: Speech normal.         Cognition and Memory: Cognition is impaired. Memory is impaired.       Assessment/Plan   Diagnoses and all orders for this visit:  Closed displaced intertrochanteric fracture of right femur, initial encounter (Multi)  Comments:  Per d/c orders pt is 25% weight bearing, utilizing marah for transfers, due to dementia pt is non-compliant. Continue to work with PT/OT.  Dementia without behavioral disturbance, psychotic disturbance, mood disturbance, or anxiety, unspecified dementia severity, unspecified dementia type (Multi)  Comments:  Monitor for cognitive decline. Refer to ST to eval and treat.  Essential hypertension  Comments:  Blood pressure controlled.  Primary osteoarthritis involving multiple joints  Comments:  Pain controlled.  Recurrent falls  Comments:  Refer to PT to eval and treat, facility with fall interventions in place.

## 2024-07-19 ENCOUNTER — NURSING HOME VISIT (OUTPATIENT)
Dept: POST ACUTE CARE | Facility: EXTERNAL LOCATION | Age: 84
End: 2024-07-19
Payer: MEDICARE

## 2024-07-19 VITALS
DIASTOLIC BLOOD PRESSURE: 50 MMHG | OXYGEN SATURATION: 99 % | HEART RATE: 71 BPM | RESPIRATION RATE: 16 BRPM | SYSTOLIC BLOOD PRESSURE: 118 MMHG

## 2024-07-19 DIAGNOSIS — E11.9 DIABETES MELLITUS WITHOUT COMPLICATION (MULTI): ICD-10-CM

## 2024-07-19 DIAGNOSIS — M15.9 PRIMARY OSTEOARTHRITIS INVOLVING MULTIPLE JOINTS: ICD-10-CM

## 2024-07-19 DIAGNOSIS — Z74.1 REQUIRES ASSISTANCE WITH ACTIVITIES OF DAILY LIVING (ADL): ICD-10-CM

## 2024-07-19 DIAGNOSIS — E78.2 MIXED HYPERLIPIDEMIA: ICD-10-CM

## 2024-07-19 DIAGNOSIS — F03.90 DEMENTIA WITHOUT BEHAVIORAL DISTURBANCE, PSYCHOTIC DISTURBANCE, MOOD DISTURBANCE, OR ANXIETY, UNSPECIFIED DEMENTIA SEVERITY, UNSPECIFIED DEMENTIA TYPE (MULTI): ICD-10-CM

## 2024-07-19 DIAGNOSIS — E55.9 VITAMIN D DEFICIENCY: ICD-10-CM

## 2024-07-19 DIAGNOSIS — I48.20 CHRONIC ATRIAL FIBRILLATION (MULTI): Primary | ICD-10-CM

## 2024-07-19 DIAGNOSIS — I10 ESSENTIAL HYPERTENSION: ICD-10-CM

## 2024-07-19 DIAGNOSIS — R29.6 RECURRENT FALLS: ICD-10-CM

## 2024-07-19 ASSESSMENT — ENCOUNTER SYMPTOMS
FATIGUE: 0
ARTHRALGIAS: 1
DECREASED CONCENTRATION: 1
NERVOUS/ANXIOUS: 0
NAUSEA: 0
DIZZINESS: 0
HEADACHES: 0
FREQUENCY: 0
WEAKNESS: 1
CONFUSION: 1
SLEEP DISTURBANCE: 0
VOMITING: 0
CHILLS: 0
LIGHT-HEADEDNESS: 0
COUGH: 0
RHINORRHEA: 0
DYSURIA: 0
CONSTIPATION: 0
FEVER: 0
SHORTNESS OF BREATH: 0
MYALGIAS: 1

## 2024-07-19 ASSESSMENT — PAIN SCALES - GENERAL: PAINLEVEL: 0-NO PAIN

## 2024-07-19 NOTE — PROGRESS NOTES
"Subjective   Patient ID: Estela Carnes is a 83 y.o. female who is assisted living/ home patient being seen at [facility], and evaluated for Routine AL Visit.     HPI     Pt visited in apartment of assisted living. Pt oriented x1, up in wheelchair, comfortable and denies pain. Pt wears glasses and appears to hear normal tone of voice. Pt states appetite is good, and then states \"oh wait, I don't know, is it?\". Pt denies headache, dizziness, congestion, fever, chills, and runny nose.     Pt denies chest pain and sob at rest/exertion. Incontinent of bowel and bladder. Pt denies sleep disturbances. Pt wheelchair bound after fracturing right hip. Pt was having repeated falls upon returning from rehab to AL, and given w/c for safety. Pt is marah lift for transfers. Pt is a poor historian, collateral information obtained from nursing. Pt total care for ADL's with exception of feeding.     Current Outpatient Medications on File Prior to Visit   Medication Sig Dispense Refill    acetaminophen (Tylenol) 325 mg tablet Take 2 tablets (650 mg) by mouth every 4 hours if needed for headaches, fever (temp greater than 38.0 C) or mild pain (1 - 3).      amLODIPine (Norvasc) 5 mg tablet Take 1 tablet (5 mg) by mouth once daily. Do not start before April 6, 2024.      apixaban (Eliquis) 2.5 mg tablet Take 1 tablet (2.5 mg) by mouth 2 times a day.      aspirin 81 mg EC tablet Take 1 tablet (81 mg) by mouth 1 time.      atorvastatin (Lipitor) 10 mg tablet Take 1 tablet (10 mg) by mouth once daily.      cholecalciferol (Vitamin D-3) 5,000 Units tablet Take 1 capsule by mouth once daily.      cyanocobalamin (Vitamin B-12) 1,000 mcg tablet Take 1 tablet (1,000 mcg) by mouth once daily.      isosorbide mononitrate ER (Imdur) 30 mg 24 hr tablet Take 1 tablet (30 mg) by mouth once daily.      magnesium oxide (Mag-Ox) 400 mg (241.3 mg magnesium) tablet Take 1 tablet (400 mg) by mouth once daily. Do not start before April 6, 2024.  "     metFORMIN  mg 24 hr tablet Take 1 tablet (500 mg) by mouth 2 times daily (morning and late afternoon).      metoprolol succinate XL (Toprol-XL) 25 mg 24 hr tablet Take 1 tablet (25 mg) by mouth once daily. Do not crush or chew. Do not start before April 6, 2024.      PlaqueniL 200 mg tablet Take 1 tablet (200 mg) by mouth 2 times a day.      polyethylene glycol (Glycolax, Miralax) 17 gram packet Take 17 g by mouth once daily as needed (Constipation).      psyllium (Metamucil) 3.4 gram packet Take 1 packet by mouth once daily. Do not start before April 6, 2024.      SITagliptin phosphate (Januvia) 100 mg tablet Take 1 tablet (100 mg) by mouth once daily. Do not start before April 6, 2024.      vit C/E/Zn/coppr/lutein/zeaxan (PRESERVISION AREDS-2 ORAL) Take 1 tablet by mouth once daily.       No current facility-administered medications on file prior to visit.      Review of Systems   Constitutional:  Negative for chills, fatigue and fever.   HENT:  Negative for congestion and rhinorrhea.    Eyes:  Negative for visual disturbance.   Respiratory:  Negative for cough and shortness of breath.    Cardiovascular:  Negative for chest pain and leg swelling.   Gastrointestinal:  Negative for constipation, nausea and vomiting.   Genitourinary:  Negative for dysuria and frequency.   Musculoskeletal:  Positive for arthralgias, gait problem and myalgias.         marah lift, wheelchair bound.    Neurological:  Positive for weakness. Negative for dizziness, syncope, light-headedness and headaches.   Psychiatric/Behavioral:  Positive for confusion and decreased concentration. Negative for sleep disturbance. The patient is not nervous/anxious.        Objective   /50 (BP Location: Right arm, Patient Position: Sitting, BP Cuff Size: Adult)   Pulse 71   Resp 16   SpO2 99%       Chemistry    Lab Results   Component Value Date/Time     04/05/2024 0547    K 3.9 04/05/2024 0547     04/05/2024 0547    CO2 26  04/05/2024 0547    BUN 11 04/05/2024 0547    CREATININE 0.40 04/05/2024 0547    Lab Results   Component Value Date/Time    CALCIUM 8.4 (L) 04/05/2024 0547    ALKPHOS 91 03/29/2024 0534    AST 35 03/29/2024 0534    ALT 22 03/29/2024 0534    BILITOT 0.3 03/29/2024 0534        Lab Results   Component Value Date    WBC 12.8 (H) 04/05/2024    HGB 8.5 (L) 04/05/2024    HCT 27.4 (L) 04/05/2024    MCV 85 04/05/2024     04/05/2024      Lab Results   Component Value Date    HGBA1C 7.2 (H) 03/29/2024      Physical Exam  Constitutional:       General: She is awake. She is not in acute distress.     Appearance: She is not ill-appearing.   HENT:      Head: Normocephalic.      Nose: Nose normal. No congestion or rhinorrhea.      Mouth/Throat:      Mouth: Mucous membranes are moist.   Eyes:      Conjunctiva/sclera: Conjunctivae normal.      Pupils: Pupils are equal, round, and reactive to light.   Cardiovascular:      Rate and Rhythm: Normal rate and regular rhythm.      Pulses: Normal pulses.      Heart sounds: Normal heart sounds.   Pulmonary:      Effort: Pulmonary effort is normal. No respiratory distress.      Breath sounds: Normal breath sounds. No wheezing or rhonchi.   Abdominal:      General: Bowel sounds are normal.      Palpations: Abdomen is soft.   Musculoskeletal:         General: No swelling. Normal range of motion.      Cervical back: Normal range of motion.      Right lower leg: No edema.      Left lower leg: No edema.   Skin:     General: Skin is warm.      Capillary Refill: Capillary refill takes less than 2 seconds.   Neurological:      Mental Status: She is alert. Mental status is at baseline.      Comments: Oriented x1   Psychiatric:         Attention and Perception: Attention normal.         Speech: Speech normal.         Behavior: Behavior is cooperative.         Cognition and Memory: Cognition is impaired. Memory is impaired.         Assessment/Plan     Diagnoses and all orders for this  visit:  Chronic atrial fibrillation (Multi)  Comments:  Continue eliquis. HR controlled today.  Dementia without behavioral disturbance, psychotic disturbance, mood disturbance, or anxiety, unspecified dementia severity, unspecified dementia type (Multi)  Comments:  Continue to monitor for cognitive decline.  Primary osteoarthritis involving multiple joints  Comments:  Pain controlled.  Essential hypertension  Comments:  Blood pressure controlled.  Recurrent falls  Comments:  Utilizes wheelchair for mobility, marah lift.  Mixed hyperlipidemia  Comments:  Continue atorvastatin.  Vitamin D deficiency  Comments:  Continue Vitamin D.  Diabetes mellitus without complication (Multi)  Comments:  Continue metformin and Januvia.  Requires assistance with activities of daily living (ADL)  Comments:  Facility chart reviewed, medications reconciled, collaboration with nursing.

## 2024-07-19 NOTE — LETTER
"Patient: Estela Carnes  : 1940    Encounter Date: 2024    Subjective  Patient ID: Estela Carnes is a 83 y.o. female who is assisted living/ home patient being seen at [facility], and evaluated for Routine AL Visit.     HPI     Pt visited in apartment of assisted living. Pt oriented x1, up in wheelchair, comfortable and denies pain. Pt wears glasses and appears to hear normal tone of voice. Pt states appetite is good, and then states \"oh wait, I don't know, is it?\". Pt denies headache, dizziness, congestion, fever, chills, and runny nose.     Pt denies chest pain and sob at rest/exertion. Incontinent of bowel and bladder. Pt denies sleep disturbances. Pt wheelchair bound after fracturing right hip. Pt was having repeated falls upon returning from rehab to AL, and given w/c for safety. Pt is marah lift for transfers. Pt is a poor historian, collateral information obtained from nursing.    Current Outpatient Medications on File Prior to Visit   Medication Sig Dispense Refill   • acetaminophen (Tylenol) 325 mg tablet Take 2 tablets (650 mg) by mouth every 4 hours if needed for headaches, fever (temp greater than 38.0 C) or mild pain (1 - 3).     • amLODIPine (Norvasc) 5 mg tablet Take 1 tablet (5 mg) by mouth once daily. Do not start before 2024.     • apixaban (Eliquis) 2.5 mg tablet Take 1 tablet (2.5 mg) by mouth 2 times a day.     • aspirin 81 mg EC tablet Take 1 tablet (81 mg) by mouth 1 time.     • atorvastatin (Lipitor) 10 mg tablet Take 1 tablet (10 mg) by mouth once daily.     • cholecalciferol (Vitamin D-3) 5,000 Units tablet Take 1 capsule by mouth once daily.     • cyanocobalamin (Vitamin B-12) 1,000 mcg tablet Take 1 tablet (1,000 mcg) by mouth once daily.     • isosorbide mononitrate ER (Imdur) 30 mg 24 hr tablet Take 1 tablet (30 mg) by mouth once daily.     • magnesium oxide (Mag-Ox) 400 mg (241.3 mg magnesium) tablet Take 1 tablet (400 mg) by mouth once daily. Do not " 65 start before April 6, 2024.     • metFORMIN  mg 24 hr tablet Take 1 tablet (500 mg) by mouth 2 times daily (morning and late afternoon).     • metoprolol succinate XL (Toprol-XL) 25 mg 24 hr tablet Take 1 tablet (25 mg) by mouth once daily. Do not crush or chew. Do not start before April 6, 2024.     • PlaqueniL 200 mg tablet Take 1 tablet (200 mg) by mouth 2 times a day.     • polyethylene glycol (Glycolax, Miralax) 17 gram packet Take 17 g by mouth once daily as needed (Constipation).     • psyllium (Metamucil) 3.4 gram packet Take 1 packet by mouth once daily. Do not start before April 6, 2024.     • SITagliptin phosphate (Januvia) 100 mg tablet Take 1 tablet (100 mg) by mouth once daily. Do not start before April 6, 2024.     • vit C/E/Zn/coppr/lutein/zeaxan (PRESERVISION AREDS-2 ORAL) Take 1 tablet by mouth once daily.       No current facility-administered medications on file prior to visit.      Review of Systems   Constitutional:  Negative for chills, fatigue and fever.   HENT:  Negative for congestion and rhinorrhea.    Eyes:  Negative for visual disturbance.   Respiratory:  Negative for cough and shortness of breath.    Cardiovascular:  Negative for chest pain and leg swelling.   Gastrointestinal:  Negative for constipation, nausea and vomiting.   Genitourinary:  Negative for dysuria and frequency.   Musculoskeletal:  Positive for arthralgias, gait problem and myalgias.         marah lift, wheelchair bound.    Neurological:  Positive for weakness. Negative for dizziness, syncope, light-headedness and headaches.   Psychiatric/Behavioral:  Positive for confusion and decreased concentration. Negative for sleep disturbance. The patient is not nervous/anxious.        Objective  /50 (BP Location: Right arm, Patient Position: Sitting, BP Cuff Size: Adult)   Pulse 71   Resp 16   SpO2 99%       Chemistry    Lab Results   Component Value Date/Time     04/05/2024 0547    K 3.9 04/05/2024 0547      04/05/2024 0547    CO2 26 04/05/2024 0547    BUN 11 04/05/2024 0547    CREATININE 0.40 04/05/2024 0547    Lab Results   Component Value Date/Time    CALCIUM 8.4 (L) 04/05/2024 0547    ALKPHOS 91 03/29/2024 0534    AST 35 03/29/2024 0534    ALT 22 03/29/2024 0534    BILITOT 0.3 03/29/2024 0534        Lab Results   Component Value Date    WBC 12.8 (H) 04/05/2024    HGB 8.5 (L) 04/05/2024    HCT 27.4 (L) 04/05/2024    MCV 85 04/05/2024     04/05/2024        Physical Exam  Constitutional:       General: She is awake. She is not in acute distress.     Appearance: She is not ill-appearing.   HENT:      Head: Normocephalic.      Nose: Nose normal. No congestion or rhinorrhea.      Mouth/Throat:      Mouth: Mucous membranes are moist.   Eyes:      Conjunctiva/sclera: Conjunctivae normal.      Pupils: Pupils are equal, round, and reactive to light.   Cardiovascular:      Rate and Rhythm: Normal rate and regular rhythm.      Pulses: Normal pulses.      Heart sounds: Normal heart sounds.   Pulmonary:      Effort: Pulmonary effort is normal. No respiratory distress.      Breath sounds: Normal breath sounds. No wheezing or rhonchi.   Abdominal:      General: Bowel sounds are normal.      Palpations: Abdomen is soft.   Musculoskeletal:         General: No swelling. Normal range of motion.      Cervical back: Normal range of motion.      Right lower leg: No edema.      Left lower leg: No edema.   Skin:     General: Skin is warm.      Capillary Refill: Capillary refill takes less than 2 seconds.   Neurological:      Mental Status: She is alert. Mental status is at baseline.      Comments: Oriented x1   Psychiatric:         Attention and Perception: Attention normal.         Speech: Speech normal.         Behavior: Behavior is cooperative.         Cognition and Memory: Cognition is impaired. Memory is impaired.         Assessment/Plan    Diagnoses and all orders for this visit:  Chronic atrial fibrillation  (Multi)  Comments:  Continue eliquis. HR controlled today.  Dementia without behavioral disturbance, psychotic disturbance, mood disturbance, or anxiety, unspecified dementia severity, unspecified dementia type (Multi)  Comments:  Continue to monitor for cognitive decline.  Primary osteoarthritis involving multiple joints  Comments:  Pain controlled.  Essential hypertension  Comments:  Blood pressure controlled.  Recurrent falls  Comments:  Utilizes wheelchair for mobility, marah lift.  Mixed hyperlipidemia  Comments:  Continue atorvastatin.  Vitamin D deficiency  Comments:  Continue Vitamin D.  Diabetes mellitus without complication (Multi)  Comments:  Continue metformin and Januvia.  Requires assistance with activities of daily living (ADL)  Comments:  Facility chart reviewed, medications reconciled, collaboration with nursing.            Electronically Signed By: JODY Calloway-CNP   7/19/24  2:12 PM

## 2024-12-13 ENCOUNTER — NURSING HOME VISIT (OUTPATIENT)
Dept: POST ACUTE CARE | Facility: EXTERNAL LOCATION | Age: 84
End: 2024-12-13
Payer: MEDICARE

## 2024-12-13 DIAGNOSIS — M15.0 PRIMARY OSTEOARTHRITIS INVOLVING MULTIPLE JOINTS: ICD-10-CM

## 2024-12-13 DIAGNOSIS — Z78.9 LIVING IN ASSISTED LIVING: ICD-10-CM

## 2024-12-13 DIAGNOSIS — I10 ESSENTIAL HYPERTENSION: Primary | ICD-10-CM

## 2024-12-13 DIAGNOSIS — F03.90 DEMENTIA WITHOUT BEHAVIORAL DISTURBANCE, PSYCHOTIC DISTURBANCE, MOOD DISTURBANCE, OR ANXIETY, UNSPECIFIED DEMENTIA SEVERITY, UNSPECIFIED DEMENTIA TYPE: ICD-10-CM

## 2024-12-13 DIAGNOSIS — E55.9 VITAMIN D DEFICIENCY: ICD-10-CM

## 2024-12-13 DIAGNOSIS — I48.20 CHRONIC ATRIAL FIBRILLATION (MULTI): ICD-10-CM

## 2024-12-13 DIAGNOSIS — E78.2 MIXED HYPERLIPIDEMIA: ICD-10-CM

## 2024-12-13 PROCEDURE — 99349 HOME/RES VST EST MOD MDM 40: CPT

## 2024-12-13 ASSESSMENT — PAIN SCALES - GENERAL: PAINLEVEL_OUTOF10: 0-NO PAIN

## 2024-12-13 NOTE — LETTER
Patient: Estela Carnes  : 1940    Encounter Date: 2024    Subjective  Patient ID: Estela Carnes is a 84 y.o. female who is assisted living/ home patient being seen at [facility],, and evaluated for Routine AL Visit.     HPI     Pt visited in apartment of assisted living. Pt oriented x1, up in wheelchair, comfortable and denies pain. Pt able to answer simple yes and no questions, however does it with hesitancy and looks to spouse for answers. Pt wears glasses and appears to hear normal tone of voice. Pt states appetite is good, and then states Pt denies headache, dizziness, congestion, fever, chills, and runny nose.     Pt denies chest pain and sob at rest/exertion. Incontinent of bowel and bladder. Pt denies sleep disturbances. Pt wheelchair bound due to frequent falls. Pt no longer a marah lift, and facility using as needed. Pt is a poor historian, non-sensical communication at times, collateral information obtained from nursing. Pt total care for ADL's with exception of feeding.    Current Outpatient Medications on File Prior to Visit   Medication Sig Dispense Refill   • acetaminophen (Tylenol) 325 mg tablet Take 2 tablets (650 mg) by mouth every 4 hours if needed for headaches, fever (temp greater than 38.0 C) or mild pain (1 - 3).     • amLODIPine (Norvasc) 5 mg tablet Take 1 tablet (5 mg) by mouth once daily. Do not start before 2024.     • apixaban (Eliquis) 2.5 mg tablet Take 1 tablet (2.5 mg) by mouth 2 times a day.     • aspirin 81 mg EC tablet Take 1 tablet (81 mg) by mouth 1 time.     • atorvastatin (Lipitor) 10 mg tablet Take 1 tablet (10 mg) by mouth once daily.     • cholecalciferol (Vitamin D-3) 5,000 Units tablet Take 1 capsule by mouth once daily.     • cyanocobalamin (Vitamin B-12) 1,000 mcg tablet Take 1 tablet (1,000 mcg) by mouth once daily.     • isosorbide mononitrate ER (Imdur) 30 mg 24 hr tablet Take 1 tablet (30 mg) by mouth once daily.     • magnesium  oxide (Mag-Ox) 400 mg (241.3 mg magnesium) tablet Take 1 tablet (400 mg) by mouth once daily. Do not start before April 6, 2024.     • metFORMIN  mg 24 hr tablet Take 1 tablet (500 mg) by mouth 2 times daily (morning and late afternoon).     • metoprolol succinate XL (Toprol-XL) 25 mg 24 hr tablet Take 1 tablet (25 mg) by mouth once daily. Do not crush or chew. Do not start before April 6, 2024.     • PlaqueniL 200 mg tablet Take 1 tablet (200 mg) by mouth 2 times a day.     • polyethylene glycol (Glycolax, Miralax) 17 gram packet Take 17 g by mouth once daily as needed (Constipation).     • psyllium (Metamucil) 3.4 gram packet Take 1 packet by mouth once daily. Do not start before April 6, 2024.     • SITagliptin phosphate (Januvia) 100 mg tablet Take 1 tablet (100 mg) by mouth once daily. Do not start before April 6, 2024.     • vit C/E/Zn/coppr/lutein/zeaxan (PRESERVISION AREDS-2 ORAL) Take 1 tablet by mouth once daily.       No current facility-administered medications on file prior to visit.       Review of Systems   Constitutional:  Negative for chills, fatigue and fever.   HENT:  Negative for congestion and rhinorrhea.    Eyes:  Negative for visual disturbance.   Respiratory:  Negative for cough and shortness of breath.    Cardiovascular:  Negative for chest pain and leg swelling.   Gastrointestinal:  Negative for constipation, nausea and vomiting.   Genitourinary:  Negative for dysuria and frequency.   Musculoskeletal:  Positive for arthralgias, gait problem and myalgias.         marah lift, wheelchair bound.    Neurological:  Positive for weakness. Negative for dizziness, syncope, light-headedness and headaches.   Psychiatric/Behavioral:  Positive for confusion and decreased concentration. Negative for sleep disturbance. The patient is not nervous/anxious.        Objective  /70 (BP Location: Right arm, Patient Position: Sitting, BP Cuff Size: Adult)   Pulse 61   Resp 16   SpO2 98%      Physical Exam  Constitutional:       General: She is awake. She is not in acute distress.  HENT:      Head: Normocephalic.      Nose: Nose normal. No congestion or rhinorrhea.      Mouth/Throat:      Mouth: Mucous membranes are moist.   Eyes:      Conjunctiva/sclera: Conjunctivae normal.      Pupils: Pupils are equal, round, and reactive to light.   Cardiovascular:      Rate and Rhythm: Normal rate and regular rhythm.      Pulses: Normal pulses.      Heart sounds: Normal heart sounds.   Pulmonary:      Effort: Pulmonary effort is normal. No respiratory distress.      Breath sounds: Normal breath sounds. No wheezing or rhonchi.   Abdominal:      General: Bowel sounds are normal.   Musculoskeletal:         General: No swelling. Normal range of motion.      Cervical back: Normal range of motion.      Right lower leg: No edema.      Left lower leg: No edema.   Skin:     General: Skin is warm.      Capillary Refill: Capillary refill takes less than 2 seconds.   Neurological:      Mental Status: She is alert. Mental status is at baseline.      Comments: Oriented x1   Psychiatric:         Attention and Perception: Attention normal.         Speech: Speech normal.         Cognition and Memory: Cognition is impaired. Memory is impaired.         Assessment/Plan  Diagnoses and all orders for this visit:  Essential hypertension  Comments:  Blood pressure controlled.  Vitamin D deficiency  Comments:  Continue Vitamin D.  Mixed hyperlipidemia  Comments:  Continue atorvastatin.  Primary osteoarthritis involving multiple joints  Comments:  Pain controlled.  Dementia without behavioral disturbance, psychotic disturbance, mood disturbance, or anxiety, unspecified dementia severity, unspecified dementia type  Comments:  Refer to ST to eval and treat.  Chronic atrial fibrillation (Multi)  Comments:  Continue eliquis. HR controlled today.  Living in assisted living  Comments:  Medical and facility chart reviewed, medication  reconciled and collaboration with nursing. CMP, CBC, Vit D, LIpid Panel and A1c ordered.            Electronically Signed By: JODY Calloway-CNP   12/16/24  3:37 PM

## 2024-12-16 VITALS
RESPIRATION RATE: 16 BRPM | HEART RATE: 61 BPM | OXYGEN SATURATION: 98 % | DIASTOLIC BLOOD PRESSURE: 70 MMHG | SYSTOLIC BLOOD PRESSURE: 110 MMHG

## 2024-12-16 ASSESSMENT — ENCOUNTER SYMPTOMS
FATIGUE: 0
COUGH: 0
MYALGIAS: 1
CHILLS: 0
WEAKNESS: 1
NERVOUS/ANXIOUS: 0
SHORTNESS OF BREATH: 0
DECREASED CONCENTRATION: 1
DYSURIA: 0
CONFUSION: 1
RHINORRHEA: 0
FEVER: 0
CONSTIPATION: 0
FREQUENCY: 0
ARTHRALGIAS: 1
LIGHT-HEADEDNESS: 0
VOMITING: 0
HEADACHES: 0
NAUSEA: 0
SLEEP DISTURBANCE: 0
DIZZINESS: 0

## 2024-12-16 NOTE — PROGRESS NOTES
Subjective   Patient ID: Estela Carnes is a 84 y.o. female who is assisted living/ home patient being seen at [facility],, and evaluated for Routine AL Visit.     HPI     Pt visited in apartment of assisted living. Pt oriented x1, up in wheelchair, comfortable and denies pain. Pt able to answer simple yes and no questions, however does it with hesitancy and looks to spouse for answers. Pt wears glasses and appears to hear normal tone of voice. Pt states appetite is good, and then states Pt denies headache, dizziness, congestion, fever, chills, and runny nose.     Pt denies chest pain and sob at rest/exertion. Incontinent of bowel and bladder. Pt denies sleep disturbances. Pt wheelchair bound due to frequent falls. Pt no longer a marah lift, and facility using as needed. Pt is a poor historian, non-sensical communication at times, collateral information obtained from nursing. Pt total care for ADL's with exception of feeding.    Current Outpatient Medications on File Prior to Visit   Medication Sig Dispense Refill    acetaminophen (Tylenol) 325 mg tablet Take 2 tablets (650 mg) by mouth every 4 hours if needed for headaches, fever (temp greater than 38.0 C) or mild pain (1 - 3).      amLODIPine (Norvasc) 5 mg tablet Take 1 tablet (5 mg) by mouth once daily. Do not start before April 6, 2024.      apixaban (Eliquis) 2.5 mg tablet Take 1 tablet (2.5 mg) by mouth 2 times a day.      aspirin 81 mg EC tablet Take 1 tablet (81 mg) by mouth 1 time.      atorvastatin (Lipitor) 10 mg tablet Take 1 tablet (10 mg) by mouth once daily.      cholecalciferol (Vitamin D-3) 5,000 Units tablet Take 1 capsule by mouth once daily.      cyanocobalamin (Vitamin B-12) 1,000 mcg tablet Take 1 tablet (1,000 mcg) by mouth once daily.      isosorbide mononitrate ER (Imdur) 30 mg 24 hr tablet Take 1 tablet (30 mg) by mouth once daily.      magnesium oxide (Mag-Ox) 400 mg (241.3 mg magnesium) tablet Take 1 tablet (400 mg) by mouth  once daily. Do not start before April 6, 2024.      metFORMIN  mg 24 hr tablet Take 1 tablet (500 mg) by mouth 2 times daily (morning and late afternoon).      metoprolol succinate XL (Toprol-XL) 25 mg 24 hr tablet Take 1 tablet (25 mg) by mouth once daily. Do not crush or chew. Do not start before April 6, 2024.      PlaqueniL 200 mg tablet Take 1 tablet (200 mg) by mouth 2 times a day.      polyethylene glycol (Glycolax, Miralax) 17 gram packet Take 17 g by mouth once daily as needed (Constipation).      psyllium (Metamucil) 3.4 gram packet Take 1 packet by mouth once daily. Do not start before April 6, 2024.      SITagliptin phosphate (Januvia) 100 mg tablet Take 1 tablet (100 mg) by mouth once daily. Do not start before April 6, 2024.      vit C/E/Zn/coppr/lutein/zeaxan (PRESERVISION AREDS-2 ORAL) Take 1 tablet by mouth once daily.       No current facility-administered medications on file prior to visit.       Review of Systems   Constitutional:  Negative for chills, fatigue and fever.   HENT:  Negative for congestion and rhinorrhea.    Eyes:  Negative for visual disturbance.   Respiratory:  Negative for cough and shortness of breath.    Cardiovascular:  Negative for chest pain and leg swelling.   Gastrointestinal:  Negative for constipation, nausea and vomiting.   Genitourinary:  Negative for dysuria and frequency.   Musculoskeletal:  Positive for arthralgias, gait problem and myalgias.         marah lift, wheelchair bound.    Neurological:  Positive for weakness. Negative for dizziness, syncope, light-headedness and headaches.   Psychiatric/Behavioral:  Positive for confusion and decreased concentration. Negative for sleep disturbance. The patient is not nervous/anxious.        Objective   /70 (BP Location: Right arm, Patient Position: Sitting, BP Cuff Size: Adult)   Pulse 61   Resp 16   SpO2 98%     Physical Exam  Constitutional:       General: She is awake. She is not in acute  distress.  HENT:      Head: Normocephalic.      Nose: Nose normal. No congestion or rhinorrhea.      Mouth/Throat:      Mouth: Mucous membranes are moist.   Eyes:      Conjunctiva/sclera: Conjunctivae normal.      Pupils: Pupils are equal, round, and reactive to light.   Cardiovascular:      Rate and Rhythm: Normal rate and regular rhythm.      Pulses: Normal pulses.      Heart sounds: Normal heart sounds.   Pulmonary:      Effort: Pulmonary effort is normal. No respiratory distress.      Breath sounds: Normal breath sounds. No wheezing or rhonchi.   Abdominal:      General: Bowel sounds are normal.   Musculoskeletal:         General: No swelling. Normal range of motion.      Cervical back: Normal range of motion.      Right lower leg: No edema.      Left lower leg: No edema.   Skin:     General: Skin is warm.      Capillary Refill: Capillary refill takes less than 2 seconds.   Neurological:      Mental Status: She is alert. Mental status is at baseline.      Comments: Oriented x1   Psychiatric:         Attention and Perception: Attention normal.         Speech: Speech normal.         Cognition and Memory: Cognition is impaired. Memory is impaired.         Assessment/Plan   Diagnoses and all orders for this visit:  Essential hypertension  Comments:  Blood pressure controlled.  Vitamin D deficiency  Comments:  Continue Vitamin D.  Mixed hyperlipidemia  Comments:  Continue atorvastatin.  Primary osteoarthritis involving multiple joints  Comments:  Pain controlled.  Dementia without behavioral disturbance, psychotic disturbance, mood disturbance, or anxiety, unspecified dementia severity, unspecified dementia type  Comments:  Refer to ST to eval and treat.  Chronic atrial fibrillation (Multi)  Comments:  Continue eliquis. HR controlled today.  Living in assisted living  Comments:  Medical and facility chart reviewed, medication reconciled and collaboration with nursing. CMP, CBC, Vit D, LIpid Panel and A1c  ordered.

## 2025-03-13 ENCOUNTER — NURSING HOME VISIT (OUTPATIENT)
Dept: POST ACUTE CARE | Facility: EXTERNAL LOCATION | Age: 85
End: 2025-03-13
Payer: MEDICARE

## 2025-03-13 DIAGNOSIS — E78.5 DYSLIPIDEMIA: ICD-10-CM

## 2025-03-13 DIAGNOSIS — Z78.9 LIVING IN ASSISTED LIVING: ICD-10-CM

## 2025-03-13 DIAGNOSIS — I48.0 PAROXYSMAL ATRIAL FIBRILLATION (MULTI): ICD-10-CM

## 2025-03-13 DIAGNOSIS — E11.9 DIABETES MELLITUS WITHOUT COMPLICATION (MULTI): Primary | ICD-10-CM

## 2025-03-13 DIAGNOSIS — E55.9 VITAMIN D DEFICIENCY: ICD-10-CM

## 2025-03-13 DIAGNOSIS — I10 ESSENTIAL HYPERTENSION: ICD-10-CM

## 2025-03-13 ASSESSMENT — PAIN SCALES - GENERAL: PAINLEVEL_OUTOF10: 0-NO PAIN

## 2025-03-13 NOTE — LETTER
"Patient: Estela Carnes  : 1940    Encounter Date: 2025    Subjective  Patient ID: Estela Carnes is a 84 y.o. female who is assisted living/ home patient being seen at [facility],  and evaluated for Routine AL Visit.     HPI     Pt visited in apartment of assisted living. Pt oriented x1, up in wheelchair, comfortable and denies pain. Spouse present for visit. Pt answers simple yes and no questions, however does it with hesitancy and looks to spouse for answers. Unable to elaborate on responses, and states \"I guess after responding\".  Pt wears glasses and appears to hear normal tone of voice. States appetite is \"good, I think\". Pt with a 1.5pd weight gain since last visit in Dec. Pt denies headache, dizziness, congestion, cough, sore throat, and runny nose.     Pt denies chest discomfort, and sob at rest/exertion. Incontinent of bowel and bladder. Denies sleep disturbances. Spouse states pt rolled out of bed last night despite barrier on patient's side of bed. Pt wheelchair bound due to frequent falls. Pt no longer a marah lift, and facility using as needed. Pt is a poor historian, non-sensical communication at times, collateral information obtained from nursing and spouse. Pt is total care for ADL's with exception of feeding.    Current Outpatient Medications on File Prior to Visit   Medication Sig Dispense Refill   • acetaminophen (Tylenol) 325 mg tablet Take 2 tablets (650 mg) by mouth every 4 hours if needed for headaches, fever (temp greater than 38.0 C) or mild pain (1 - 3).     • amLODIPine (Norvasc) 5 mg tablet Take 1 tablet (5 mg) by mouth once daily. Do not start before 2024.     • apixaban (Eliquis) 2.5 mg tablet Take 1 tablet (2.5 mg) by mouth 2 times a day.     • atorvastatin (Lipitor) 10 mg tablet Take 1 tablet (10 mg) by mouth once daily.     • cholecalciferol (Vitamin D-3) 5,000 Units tablet Take 1 capsule by mouth once daily.     • isosorbide mononitrate ER (Imdur) " 30 mg 24 hr tablet Take 1 tablet (30 mg) by mouth once daily.     • magnesium oxide (Mag-Ox) 400 mg (241.3 mg magnesium) tablet Take 1 tablet (400 mg) by mouth once daily. Do not start before April 6, 2024.     • metFORMIN  mg 24 hr tablet Take 1 tablet (500 mg) by mouth 2 times daily (morning and late afternoon).     • metoprolol succinate XL (Toprol-XL) 25 mg 24 hr tablet Take 1 tablet (25 mg) by mouth once daily. Do not crush or chew. Do not start before April 6, 2024.     • PlaqueniL 200 mg tablet Take 1 tablet (200 mg) by mouth 2 times a day.     • polyethylene glycol (Glycolax, Miralax) 17 gram packet Take 17 g by mouth once daily as needed (Constipation).     • SITagliptin phosphate (Januvia) 100 mg tablet Take 1 tablet (100 mg) by mouth once daily. Do not start before April 6, 2024.     • vit C/E/Zn/coppr/lutein/zeaxan (PRESERVISION AREDS-2 ORAL) Take 1 tablet by mouth once daily.     • [DISCONTINUED] aspirin 81 mg EC tablet Take 1 tablet (81 mg) by mouth 1 time. (Patient not taking: Reported on 3/15/2025)     • [DISCONTINUED] cyanocobalamin (Vitamin B-12) 1,000 mcg tablet Take 1 tablet (1,000 mcg) by mouth once daily. (Patient not taking: Reported on 3/15/2025)     • [DISCONTINUED] psyllium (Metamucil) 3.4 gram packet Take 1 packet by mouth once daily. Do not start before April 6, 2024. (Patient not taking: Reported on 3/15/2025)       No current facility-administered medications on file prior to visit.        Review of Systems   Constitutional:  Negative for activity change, appetite change, chills, fatigue and fever.   HENT:  Negative for congestion and rhinorrhea.    Eyes:  Negative for visual disturbance.   Respiratory:  Negative for cough and shortness of breath.    Cardiovascular:  Negative for chest pain and leg swelling.   Gastrointestinal:  Negative for constipation, diarrhea, nausea and vomiting.   Endocrine: Negative for polydipsia, polyphagia and polyuria.   Genitourinary:  Negative for  "dysuria and frequency.   Musculoskeletal:  Positive for arthralgias, gait problem and myalgias.        Wheelchair bound   Neurological:  Positive for weakness. Negative for dizziness, syncope, light-headedness and headaches.   Psychiatric/Behavioral:  Positive for confusion and decreased concentration. Negative for sleep disturbance. The patient is not nervous/anxious.        Objective  /60   Pulse 90   Resp 16   Ht 1.651 m (5' 5\")   Wt 57.5 kg (126 lb 11.2 oz)   SpO2 99%   BMI 21.08 kg/m²     Physical Exam  Constitutional:       General: She is awake. She is not in acute distress.     Appearance: Normal appearance. She is not ill-appearing.   HENT:      Head: Normocephalic.      Nose: Nose normal. No congestion or rhinorrhea.      Mouth/Throat:      Mouth: Mucous membranes are moist.   Eyes:      Conjunctiva/sclera: Conjunctivae normal.      Pupils: Pupils are equal, round, and reactive to light.   Cardiovascular:      Rate and Rhythm: Normal rate and regular rhythm.      Pulses: Normal pulses.      Heart sounds: Normal heart sounds.   Pulmonary:      Effort: Pulmonary effort is normal. No respiratory distress.      Breath sounds: Normal breath sounds. No wheezing or rhonchi.   Abdominal:      General: Bowel sounds are normal.      Palpations: Abdomen is soft.   Musculoskeletal:         General: Normal range of motion.      Cervical back: Normal range of motion.      Right lower leg: No edema.      Left lower leg: No edema.   Skin:     General: Skin is warm.      Capillary Refill: Capillary refill takes less than 2 seconds.   Neurological:      Mental Status: She is alert. Mental status is at baseline.      Comments: Oriented x1   Psychiatric:         Attention and Perception: Attention normal.         Speech: Speech normal.         Behavior: Behavior is cooperative.         Cognition and Memory: Cognition is impaired. Memory is impaired.         Assessment/Plan  Diagnoses and all orders for this " visit:  Diabetes mellitus without complication (Multi)  Comments:  A1C 7.6 on 12/19/24, AL to obtain A1C, currently taking metformin and januvia.  Essential hypertension  Comments:  Blood pressure controlled.  Dyslipidemia  Comments:  Lipid panel obtained 12/19/24, continue atorvastatin.  Paroxysmal atrial fibrillation (Multi)  Comments:  HR controlled. Continue eliquis.  Vitamin D deficiency  Comments:  Vitamin D obtained 12/19/24, WNL.  Living in assisted living  Comments:  Medical and facility chart reviewed, medication reconciled, collaboration with nursing, AL to obtain BMP, CBC, and A1C.            Electronically Signed By: JOYD Calloway-CNP   3/15/25  8:07 PM

## 2025-03-15 VITALS
HEART RATE: 90 BPM | WEIGHT: 126.7 LBS | DIASTOLIC BLOOD PRESSURE: 60 MMHG | OXYGEN SATURATION: 99 % | RESPIRATION RATE: 16 BRPM | BODY MASS INDEX: 21.11 KG/M2 | HEIGHT: 65 IN | SYSTOLIC BLOOD PRESSURE: 138 MMHG

## 2025-03-15 ASSESSMENT — ENCOUNTER SYMPTOMS
FREQUENCY: 0
NAUSEA: 0
RHINORRHEA: 0
APPETITE CHANGE: 0
HEADACHES: 0
SHORTNESS OF BREATH: 0
MYALGIAS: 1
SLEEP DISTURBANCE: 0
ARTHRALGIAS: 1
DIARRHEA: 0
CHILLS: 0
CONSTIPATION: 0
FATIGUE: 0
LIGHT-HEADEDNESS: 0
NERVOUS/ANXIOUS: 0
DYSURIA: 0
DECREASED CONCENTRATION: 1
CONFUSION: 1
COUGH: 0
VOMITING: 0
POLYDIPSIA: 0
FEVER: 0
ACTIVITY CHANGE: 0
DIZZINESS: 0
WEAKNESS: 1
POLYPHAGIA: 0

## 2025-03-15 NOTE — PROGRESS NOTES
"Subjective   Patient ID: Estela Carnes is a 84 y.o. female who is assisted living/ home patient being seen at [facility],  and evaluated for Routine AL Visit.     HPI     Pt visited in apartment of assisted living. Pt oriented x1, up in wheelchair, comfortable and denies pain. Spouse present for visit. Pt answers simple yes and no questions, however does it with hesitancy and looks to spouse for answers. Unable to elaborate on responses, and states \"I guess after responding\".  Pt wears glasses and appears to hear normal tone of voice. States appetite is \"good, I think\". Pt with a 1.5pd weight gain since last visit in Dec. Pt denies headache, dizziness, congestion, cough, sore throat, and runny nose.     Pt denies chest discomfort, and sob at rest/exertion. Incontinent of bowel and bladder. Denies sleep disturbances. Spouse states pt rolled out of bed last night despite barrier on patient's side of bed. Pt wheelchair bound due to frequent falls. Pt no longer a marah lift, and facility using as needed. Pt is a poor historian, non-sensical communication at times, collateral information obtained from nursing and spouse. Pt is total care for ADL's with exception of feeding.    Current Outpatient Medications on File Prior to Visit   Medication Sig Dispense Refill    acetaminophen (Tylenol) 325 mg tablet Take 2 tablets (650 mg) by mouth every 4 hours if needed for headaches, fever (temp greater than 38.0 C) or mild pain (1 - 3).      amLODIPine (Norvasc) 5 mg tablet Take 1 tablet (5 mg) by mouth once daily. Do not start before April 6, 2024.      apixaban (Eliquis) 2.5 mg tablet Take 1 tablet (2.5 mg) by mouth 2 times a day.      atorvastatin (Lipitor) 10 mg tablet Take 1 tablet (10 mg) by mouth once daily.      cholecalciferol (Vitamin D-3) 5,000 Units tablet Take 1 capsule by mouth once daily.      isosorbide mononitrate ER (Imdur) 30 mg 24 hr tablet Take 1 tablet (30 mg) by mouth once daily.      magnesium " oxide (Mag-Ox) 400 mg (241.3 mg magnesium) tablet Take 1 tablet (400 mg) by mouth once daily. Do not start before April 6, 2024.      metFORMIN  mg 24 hr tablet Take 1 tablet (500 mg) by mouth 2 times daily (morning and late afternoon).      metoprolol succinate XL (Toprol-XL) 25 mg 24 hr tablet Take 1 tablet (25 mg) by mouth once daily. Do not crush or chew. Do not start before April 6, 2024.      PlaqueniL 200 mg tablet Take 1 tablet (200 mg) by mouth 2 times a day.      polyethylene glycol (Glycolax, Miralax) 17 gram packet Take 17 g by mouth once daily as needed (Constipation).      SITagliptin phosphate (Januvia) 100 mg tablet Take 1 tablet (100 mg) by mouth once daily. Do not start before April 6, 2024.      vit C/E/Zn/coppr/lutein/zeaxan (PRESERVISION AREDS-2 ORAL) Take 1 tablet by mouth once daily.      [DISCONTINUED] aspirin 81 mg EC tablet Take 1 tablet (81 mg) by mouth 1 time. (Patient not taking: Reported on 3/15/2025)      [DISCONTINUED] cyanocobalamin (Vitamin B-12) 1,000 mcg tablet Take 1 tablet (1,000 mcg) by mouth once daily. (Patient not taking: Reported on 3/15/2025)      [DISCONTINUED] psyllium (Metamucil) 3.4 gram packet Take 1 packet by mouth once daily. Do not start before April 6, 2024. (Patient not taking: Reported on 3/15/2025)       No current facility-administered medications on file prior to visit.        Review of Systems   Constitutional:  Negative for activity change, appetite change, chills, fatigue and fever.   HENT:  Negative for congestion and rhinorrhea.    Eyes:  Negative for visual disturbance.   Respiratory:  Negative for cough and shortness of breath.    Cardiovascular:  Negative for chest pain and leg swelling.   Gastrointestinal:  Negative for constipation, diarrhea, nausea and vomiting.   Endocrine: Negative for polydipsia, polyphagia and polyuria.   Genitourinary:  Negative for dysuria and frequency.   Musculoskeletal:  Positive for arthralgias, gait problem and  "myalgias.        Wheelchair bound   Neurological:  Positive for weakness. Negative for dizziness, syncope, light-headedness and headaches.   Psychiatric/Behavioral:  Positive for confusion and decreased concentration. Negative for sleep disturbance. The patient is not nervous/anxious.        Objective   /60   Pulse 90   Resp 16   Ht 1.651 m (5' 5\")   Wt 57.5 kg (126 lb 11.2 oz)   SpO2 99%   BMI 21.08 kg/m²     Physical Exam  Constitutional:       General: She is awake. She is not in acute distress.     Appearance: Normal appearance. She is not ill-appearing.   HENT:      Head: Normocephalic.      Nose: Nose normal. No congestion or rhinorrhea.      Mouth/Throat:      Mouth: Mucous membranes are moist.   Eyes:      Conjunctiva/sclera: Conjunctivae normal.      Pupils: Pupils are equal, round, and reactive to light.   Cardiovascular:      Rate and Rhythm: Normal rate and regular rhythm.      Pulses: Normal pulses.      Heart sounds: Normal heart sounds.   Pulmonary:      Effort: Pulmonary effort is normal. No respiratory distress.      Breath sounds: Normal breath sounds. No wheezing or rhonchi.   Abdominal:      General: Bowel sounds are normal.      Palpations: Abdomen is soft.   Musculoskeletal:         General: Normal range of motion.      Cervical back: Normal range of motion.      Right lower leg: No edema.      Left lower leg: No edema.   Skin:     General: Skin is warm.      Capillary Refill: Capillary refill takes less than 2 seconds.   Neurological:      Mental Status: She is alert. Mental status is at baseline.      Comments: Oriented x1   Psychiatric:         Attention and Perception: Attention normal.         Speech: Speech normal.         Behavior: Behavior is cooperative.         Cognition and Memory: Cognition is impaired. Memory is impaired.         Assessment/Plan   Diagnoses and all orders for this visit:  Diabetes mellitus without complication (Multi)  Comments:  A1C 7.6 on 12/19/24, AL " to obtain A1C, currently taking metformin and januvia.  Essential hypertension  Comments:  Blood pressure controlled.  Dyslipidemia  Comments:  Lipid panel obtained 12/19/24, continue atorvastatin.  Paroxysmal atrial fibrillation (Multi)  Comments:  HR controlled. Continue eliquis.  Vitamin D deficiency  Comments:  Vitamin D obtained 12/19/24, WNL.  Living in assisted living  Comments:  Medical and facility chart reviewed, medication reconciled, collaboration with nursing, AL to obtain BMP, CBC, and A1C.

## 2025-04-01 ENCOUNTER — NURSING HOME VISIT (OUTPATIENT)
Dept: POST ACUTE CARE | Facility: EXTERNAL LOCATION | Age: 85
End: 2025-04-01
Payer: COMMERCIAL

## 2025-04-01 DIAGNOSIS — R19.7 DIARRHEA, UNSPECIFIED TYPE: Primary | ICD-10-CM

## 2025-04-01 DIAGNOSIS — Z78.9 LIVING IN ASSISTED LIVING: ICD-10-CM

## 2025-04-01 PROCEDURE — 99349 HOME/RES VST EST MOD MDM 40: CPT

## 2025-04-01 NOTE — LETTER
"Patient: Estela Carnes  : 1940    Encounter Date: 2025    Subjective  Patient ID: Estela Carnes is a 84 y.o. female who is assisted living/ home patient being seen at [facility], and evaluated for diarrhea.     Diarrhea   Associated symptoms include arthralgias and myalgias. Pertinent negatives include no chills, coughing, fever, headaches or vomiting.      HPI:  Pt visited in apartment of assisted living. Caregivers reporting frequent loose stools daily. Pt up in wheelchair, and oriented x1. Pt with a hx of Dementia, anemia, and GERD. Pt unable to answer questions. States \"no, or I don't know\". Spouse present for visit and states \"she has had this problem for a long time, it started after she got out of the hospital last year for her hip\". Pt was started on magnesium oxide at that time of discharge. Explained pt may be having side effects from medication. Spouse is unaware why pt takes magnesium. Spouse never mentioned diarrhea as he thought this was a \"normal\" thing, and did not question it. Pt unable to provide any information regarding diarrhea due to cognition. Caregivers reports pt is incontinent of bowel and bladder, and requires assistance with all transfers. Pt total care for ADL's.     Current Outpatient Medications on File Prior to Visit   Medication Sig Dispense Refill   • acetaminophen (Tylenol) 325 mg tablet Take 2 tablets (650 mg) by mouth every 4 hours if needed for headaches, fever (temp greater than 38.0 C) or mild pain (1 - 3).     • amLODIPine (Norvasc) 5 mg tablet Take 1 tablet (5 mg) by mouth once daily. Do not start before 2024.     • apixaban (Eliquis) 2.5 mg tablet Take 1 tablet (2.5 mg) by mouth 2 times a day.     • atorvastatin (Lipitor) 10 mg tablet Take 1 tablet (10 mg) by mouth once daily.     • cholecalciferol (Vitamin D-3) 5,000 Units tablet Take 1 capsule by mouth once daily.     • isosorbide mononitrate ER (Imdur) 30 mg 24 hr tablet Take 1 tablet " (30 mg) by mouth once daily.     • metFORMIN  mg 24 hr tablet Take 1 tablet (500 mg) by mouth 2 times daily (morning and late afternoon).     • metoprolol succinate XL (Toprol-XL) 25 mg 24 hr tablet Take 1 tablet (25 mg) by mouth once daily. Do not crush or chew. Do not start before April 6, 2024.     • PlaqueniL 200 mg tablet Take 1 tablet (200 mg) by mouth 2 times a day.     • polyethylene glycol (Glycolax, Miralax) 17 gram packet Take 17 g by mouth once daily as needed (Constipation).     • SITagliptin phosphate (Januvia) 100 mg tablet Take 1 tablet (100 mg) by mouth once daily. Do not start before April 6, 2024.     • vit C/E/Zn/coppr/lutein/zeaxan (PRESERVISION AREDS-2 ORAL) Take 1 tablet by mouth once daily.     • [DISCONTINUED] magnesium oxide (Mag-Ox) 400 mg (241.3 mg magnesium) tablet Take 1 tablet (400 mg) by mouth once daily. Do not start before April 6, 2024.       No current facility-administered medications on file prior to visit.        Review of Systems   Constitutional:  Negative for activity change, appetite change, chills, fatigue and fever.   HENT:  Negative for congestion and rhinorrhea.    Eyes:  Negative for visual disturbance.   Respiratory:  Negative for cough and shortness of breath.    Cardiovascular:  Negative for chest pain and leg swelling.   Gastrointestinal:  Positive for diarrhea. Negative for constipation, nausea and vomiting.   Endocrine: Negative for polydipsia, polyphagia and polyuria.   Genitourinary:  Negative for dysuria and frequency.   Musculoskeletal:  Positive for arthralgias, gait problem and myalgias.        Wheelchair bound   Neurological:  Positive for weakness. Negative for dizziness, syncope, light-headedness and headaches.   Psychiatric/Behavioral:  Positive for confusion and decreased concentration. Negative for sleep disturbance. The patient is not nervous/anxious.        Objective  There were no vitals taken for this visit.    Physical Exam  Constitutional:        General: She is awake. She is not in acute distress.     Appearance: Normal appearance. She is not ill-appearing.   HENT:      Head: Normocephalic.      Nose: Nose normal. No congestion or rhinorrhea.      Mouth/Throat:      Mouth: Mucous membranes are moist.   Eyes:      Conjunctiva/sclera: Conjunctivae normal.      Pupils: Pupils are equal, round, and reactive to light.   Cardiovascular:      Rate and Rhythm: Normal rate and regular rhythm.      Pulses: Normal pulses.      Heart sounds: Normal heart sounds.   Pulmonary:      Effort: Pulmonary effort is normal. No respiratory distress.      Breath sounds: Normal breath sounds. No wheezing or rhonchi.   Abdominal:      General: Bowel sounds are normal.      Palpations: Abdomen is soft.   Musculoskeletal:         General: Normal range of motion.      Cervical back: Normal range of motion.      Right lower leg: No edema.      Left lower leg: No edema.   Skin:     General: Skin is warm.      Capillary Refill: Capillary refill takes less than 2 seconds.   Neurological:      Mental Status: She is alert. Mental status is at baseline.      Comments: Oriented x1   Psychiatric:         Attention and Perception: Attention normal.         Speech: Speech normal.         Behavior: Behavior is cooperative.         Cognition and Memory: Cognition is impaired. Memory is impaired.         Assessment/Plan  Diagnoses and all orders for this visit:  Diarrhea, unspecified type  Comments:  Discontinue magnesium oxide. Mag levels wnl last year during admission. Will recheck a magnesium level in 2 weeks at the AL.  Living in assisted living  Comments:  Medical and facility chart reviewed, medications reconciled and collaboration with nursing.          Electronically Signed By: JODY Calloway-CNP   4/2/25  2:17 PM

## 2025-04-02 ASSESSMENT — ENCOUNTER SYMPTOMS
LIGHT-HEADEDNESS: 0
FATIGUE: 0
ARTHRALGIAS: 1
DIZZINESS: 0
HEADACHES: 0
SHORTNESS OF BREATH: 0
VOMITING: 0
MYALGIAS: 1
DIARRHEA: 1
FREQUENCY: 0
CHILLS: 0
CONSTIPATION: 0
DYSURIA: 0
FEVER: 0
CONFUSION: 1
NAUSEA: 0
DECREASED CONCENTRATION: 1
COUGH: 0
WEAKNESS: 1
POLYPHAGIA: 0
NERVOUS/ANXIOUS: 0
RHINORRHEA: 0
APPETITE CHANGE: 0
SLEEP DISTURBANCE: 0
ACTIVITY CHANGE: 0
POLYDIPSIA: 0

## 2025-04-02 NOTE — PROGRESS NOTES
"Subjective   Patient ID: Estela Carnes is a 84 y.o. female who is assisted living/ home patient being seen at [facility], and evaluated for diarrhea.     Diarrhea   Associated symptoms include arthralgias and myalgias. Pertinent negatives include no chills, coughing, fever, headaches or vomiting.      HPI:  Pt visited in apartment of assisted living. Caregivers reporting frequent loose stools daily. Pt up in wheelchair, and oriented x1. Pt with a hx of Dementia, anemia, and GERD. Pt unable to answer questions. States \"no, or I don't know\". Spouse present for visit and states \"she has had this problem for a long time, it started after she got out of the hospital last year for her hip\". Pt was started on magnesium oxide at that time of discharge. Explained pt may be having side effects from medication. Spouse is unaware why pt takes magnesium. Spouse never mentioned diarrhea as he thought this was a \"normal\" thing, and did not question it. Pt unable to provide any information regarding diarrhea due to cognition. Caregivers reports pt is incontinent of bowel and bladder, and requires assistance with all transfers. Pt total care for ADL's.     Current Outpatient Medications on File Prior to Visit   Medication Sig Dispense Refill    acetaminophen (Tylenol) 325 mg tablet Take 2 tablets (650 mg) by mouth every 4 hours if needed for headaches, fever (temp greater than 38.0 C) or mild pain (1 - 3).      amLODIPine (Norvasc) 5 mg tablet Take 1 tablet (5 mg) by mouth once daily. Do not start before April 6, 2024.      apixaban (Eliquis) 2.5 mg tablet Take 1 tablet (2.5 mg) by mouth 2 times a day.      atorvastatin (Lipitor) 10 mg tablet Take 1 tablet (10 mg) by mouth once daily.      cholecalciferol (Vitamin D-3) 5,000 Units tablet Take 1 capsule by mouth once daily.      isosorbide mononitrate ER (Imdur) 30 mg 24 hr tablet Take 1 tablet (30 mg) by mouth once daily.      metFORMIN  mg 24 hr tablet Take 1 " tablet (500 mg) by mouth 2 times daily (morning and late afternoon).      metoprolol succinate XL (Toprol-XL) 25 mg 24 hr tablet Take 1 tablet (25 mg) by mouth once daily. Do not crush or chew. Do not start before April 6, 2024.      PlaqueniL 200 mg tablet Take 1 tablet (200 mg) by mouth 2 times a day.      polyethylene glycol (Glycolax, Miralax) 17 gram packet Take 17 g by mouth once daily as needed (Constipation).      SITagliptin phosphate (Januvia) 100 mg tablet Take 1 tablet (100 mg) by mouth once daily. Do not start before April 6, 2024.      vit C/E/Zn/coppr/lutein/zeaxan (PRESERVISION AREDS-2 ORAL) Take 1 tablet by mouth once daily.      [DISCONTINUED] magnesium oxide (Mag-Ox) 400 mg (241.3 mg magnesium) tablet Take 1 tablet (400 mg) by mouth once daily. Do not start before April 6, 2024.       No current facility-administered medications on file prior to visit.        Review of Systems   Constitutional:  Negative for activity change, appetite change, chills, fatigue and fever.   HENT:  Negative for congestion and rhinorrhea.    Eyes:  Negative for visual disturbance.   Respiratory:  Negative for cough and shortness of breath.    Cardiovascular:  Negative for chest pain and leg swelling.   Gastrointestinal:  Positive for diarrhea. Negative for constipation, nausea and vomiting.   Endocrine: Negative for polydipsia, polyphagia and polyuria.   Genitourinary:  Negative for dysuria and frequency.   Musculoskeletal:  Positive for arthralgias, gait problem and myalgias.        Wheelchair bound   Neurological:  Positive for weakness. Negative for dizziness, syncope, light-headedness and headaches.   Psychiatric/Behavioral:  Positive for confusion and decreased concentration. Negative for sleep disturbance. The patient is not nervous/anxious.        Objective   There were no vitals taken for this visit.    Physical Exam  Constitutional:       General: She is awake. She is not in acute distress.     Appearance:  Normal appearance. She is not ill-appearing.   HENT:      Head: Normocephalic.      Nose: Nose normal. No congestion or rhinorrhea.      Mouth/Throat:      Mouth: Mucous membranes are moist.   Eyes:      Conjunctiva/sclera: Conjunctivae normal.      Pupils: Pupils are equal, round, and reactive to light.   Cardiovascular:      Rate and Rhythm: Normal rate and regular rhythm.      Pulses: Normal pulses.      Heart sounds: Normal heart sounds.   Pulmonary:      Effort: Pulmonary effort is normal. No respiratory distress.      Breath sounds: Normal breath sounds. No wheezing or rhonchi.   Abdominal:      General: Bowel sounds are normal.      Palpations: Abdomen is soft.   Musculoskeletal:         General: Normal range of motion.      Cervical back: Normal range of motion.      Right lower leg: No edema.      Left lower leg: No edema.   Skin:     General: Skin is warm.      Capillary Refill: Capillary refill takes less than 2 seconds.   Neurological:      Mental Status: She is alert. Mental status is at baseline.      Comments: Oriented x1   Psychiatric:         Attention and Perception: Attention normal.         Speech: Speech normal.         Behavior: Behavior is cooperative.         Cognition and Memory: Cognition is impaired. Memory is impaired.         Assessment/Plan   Diagnoses and all orders for this visit:  Diarrhea, unspecified type  Comments:  Discontinue magnesium oxide. Mag levels wnl last year during admission. Will recheck a magnesium level in 2 weeks at the AL.  Living in assisted living  Comments:  Medical and facility chart reviewed, medications reconciled and collaboration with nursing.

## 2025-06-11 ENCOUNTER — TELEPHONE (OUTPATIENT)
Dept: PRIMARY CARE | Facility: CLINIC | Age: 85
End: 2025-06-11
Payer: COMMERCIAL

## 2025-06-11 NOTE — TELEPHONE ENCOUNTER
Please see patient as she is scheduled to move from facility on or by 6/30/25 will need H and P completed.  Thank you in advance.

## 2025-06-13 ENCOUNTER — NURSING HOME VISIT (OUTPATIENT)
Dept: POST ACUTE CARE | Facility: EXTERNAL LOCATION | Age: 85
End: 2025-06-13
Payer: COMMERCIAL

## 2025-06-13 DIAGNOSIS — Z78.9 LIVING IN ASSISTED LIVING: ICD-10-CM

## 2025-06-13 DIAGNOSIS — E78.2 MIXED HYPERLIPIDEMIA: ICD-10-CM

## 2025-06-13 DIAGNOSIS — E11.9 DIABETES MELLITUS WITHOUT COMPLICATION: ICD-10-CM

## 2025-06-13 DIAGNOSIS — I10 ESSENTIAL HYPERTENSION: Primary | ICD-10-CM

## 2025-06-13 DIAGNOSIS — F01.50 VASCULAR DEMENTIA WITHOUT BEHAVIORAL DISTURBANCE, PSYCHOTIC DISTURBANCE, MOOD DISTURBANCE, OR ANXIETY, UNSPECIFIED DEMENTIA SEVERITY (MULTI): ICD-10-CM

## 2025-06-13 NOTE — LETTER
"Patient: Estela Carnes  : 1940    Encounter Date: 2025    Subjective  Patient ID: Estela Carnes is a 84 y.o. female who is assisted living/ home patient being seen at [facility], and evaluated for completion of assisted living paperwork.     HPI     Pt visited in apartment of assisted living. Pt oriented x1, up in wheelchair, comfortable and denies pain. Spouse present for visit. Pt answers simple yes and no questions, or responds with 2-3 words. Pt wears glasses and appears to hear normal tone of voice. States appetite is \"good\". Pt's weights have been stable last six months within 5 pounds. Pt denies headache, dizziness, congestion, cough, sore throat, and runny nose.     Pt denies chest tenderness, and sob at rest/exertion. Incontinent of bowel and bladder. Denies sleep disturbances. Pt wheelchair bound after a right hip fracture a year ago. Pt with a hx of frequent falls, no recent falls reported. Pt is a transfer with one assist. Pt total care for ADL's with exception of feeding. Pt is a poor historian, non-sensical communication at times, collateral information obtained from nursing and spouse.  9    Medications Ordered Prior to Encounter[1]     Review of Systems   Constitutional:  Negative for activity change, appetite change, chills, fatigue and fever.   HENT:  Negative for congestion and rhinorrhea.    Eyes:  Negative for visual disturbance.   Respiratory:  Negative for cough and shortness of breath.    Cardiovascular:  Negative for chest pain and leg swelling.   Gastrointestinal:  Negative for constipation, diarrhea, nausea and vomiting.   Endocrine: Negative for polydipsia, polyphagia and polyuria.   Genitourinary:  Negative for dysuria and frequency.   Musculoskeletal:  Positive for arthralgias, gait problem and myalgias.        Wheelchair bound, 1 person assist for transfers   Neurological:  Positive for weakness. Negative for dizziness, syncope, light-headedness and " "headaches.   Psychiatric/Behavioral:  Positive for confusion and decreased concentration. Negative for sleep disturbance. The patient is not nervous/anxious.        Objective  /62   Pulse 62   Resp 16   Ht 1.651 m (5' 5\")   Wt 59.2 kg (130 lb 8 oz)   SpO2 99%   BMI 21.72 kg/m²     Physical Exam  Constitutional:       General: She is awake. She is not in acute distress.     Appearance: Normal appearance. She is not ill-appearing.   HENT:      Head: Normocephalic.      Nose: Nose normal. No congestion or rhinorrhea.      Mouth/Throat:      Mouth: Mucous membranes are moist.   Eyes:      Conjunctiva/sclera: Conjunctivae normal.      Pupils: Pupils are equal, round, and reactive to light.   Cardiovascular:      Rate and Rhythm: Normal rate and regular rhythm.      Pulses: Normal pulses.      Heart sounds: Normal heart sounds.   Pulmonary:      Effort: Pulmonary effort is normal. No respiratory distress.      Breath sounds: Normal breath sounds. No wheezing or rhonchi.   Abdominal:      General: Bowel sounds are normal.      Palpations: Abdomen is soft.   Musculoskeletal:         General: Normal range of motion.      Cervical back: Normal range of motion.      Right lower leg: No edema.      Left lower leg: No edema.   Skin:     General: Skin is warm.      Capillary Refill: Capillary refill takes less than 2 seconds.   Neurological:      Mental Status: She is alert. Mental status is at baseline.      Comments: Oriented x1   Psychiatric:         Attention and Perception: Attention normal.         Speech: Speech normal.         Behavior: Behavior is cooperative.         Cognition and Memory: Cognition is impaired. Memory is impaired.         Assessment/Plan  Diagnoses and all orders for this visit:  Essential hypertension  Comments:  Blood pressure controlled.  Diabetes mellitus without complication  Comments:  Obtain HgbA1c.  Vascular dementia without behavioral disturbance, psychotic disturbance, mood " disturbance, or anxiety, unspecified dementia severity (Multi)  Comments:  Monitor for cognitve decline. Pt appropriate to live with spouse in AL, without spouse pt would require secured memory care unit for supervision/care.  Mixed hyperlipidemia  Comments:  Continue atorvastatin.  Living in assisted living  Comments:  Medical and facility chart reviewed, medications reconciled and collaboration with nursing. H&P paperwork completed for [place] and [facility].              Electronically Signed By: MYAH Calloway   6/14/25  2:02 AM       [1]  Current Outpatient Medications on File Prior to Visit   Medication Sig Dispense Refill   • acetaminophen (Tylenol) 325 mg tablet Take 2 tablets (650 mg) by mouth every 4 hours if needed for headaches, fever (temp greater than 38.0 C) or mild pain (1 - 3). (Patient taking differently: Take 2 tablets (650 mg) by mouth every 6 hours if needed for headaches, fever (temp greater than 38.0 C) or mild pain (1 - 3). And 650mg po once daily.)     • amLODIPine (Norvasc) 5 mg tablet Take 1 tablet (5 mg) by mouth once daily. Do not start before April 6, 2024.     • apixaban (Eliquis) 2.5 mg tablet Take 1 tablet (2.5 mg) by mouth 2 times a day.     • atorvastatin (Lipitor) 10 mg tablet Take 1 tablet (10 mg) by mouth once daily.     • cholecalciferol (Vitamin D-3) 5,000 Units tablet Take 1 capsule by mouth once daily.     • isosorbide mononitrate ER (Imdur) 30 mg 24 hr tablet Take 1 tablet (30 mg) by mouth once daily.     • magnesium hydroxide (Milk of Magnesia) 400 mg/5 mL suspension Take 30 mL by mouth once daily as needed for constipation.     • metFORMIN  mg 24 hr tablet Take 1 tablet (500 mg) by mouth 2 times daily (morning and late afternoon).     • metoprolol succinate XL (Toprol-XL) 25 mg 24 hr tablet Take 1 tablet (25 mg) by mouth once daily. Do not crush or chew. Do not start before April 6, 2024.     • PlaqueniL 200 mg tablet Take 1 tablet (200 mg) by mouth 2  times a day.     • polyethylene glycol (Glycolax, Miralax) 17 gram packet Take 17 g by mouth once daily as needed (Constipation).     • SITagliptin phosphate (Januvia) 100 mg tablet Take 1 tablet (100 mg) by mouth once daily. Do not start before April 6, 2024.     • vit C/E/Zn/coppr/lutein/zeaxan (PRESERVISION AREDS-2 ORAL) Take 1 tablet by mouth once daily.       No current facility-administered medications on file prior to visit.

## 2025-06-14 VITALS
RESPIRATION RATE: 16 BRPM | HEIGHT: 65 IN | DIASTOLIC BLOOD PRESSURE: 62 MMHG | OXYGEN SATURATION: 99 % | HEART RATE: 62 BPM | SYSTOLIC BLOOD PRESSURE: 118 MMHG | WEIGHT: 130.5 LBS | BODY MASS INDEX: 21.74 KG/M2

## 2025-06-14 RX ORDER — ADHESIVE BANDAGE
30 BANDAGE TOPICAL DAILY PRN
COMMUNITY

## 2025-06-14 ASSESSMENT — ENCOUNTER SYMPTOMS
RHINORRHEA: 0
SHORTNESS OF BREATH: 0
FREQUENCY: 0
POLYPHAGIA: 0
HEADACHES: 0
WEAKNESS: 1
APPETITE CHANGE: 0
NAUSEA: 0
MYALGIAS: 1
ARTHRALGIAS: 1
VOMITING: 0
FATIGUE: 0
NERVOUS/ANXIOUS: 0
LIGHT-HEADEDNESS: 0
FEVER: 0
DIARRHEA: 0
DIZZINESS: 0
ACTIVITY CHANGE: 0
POLYDIPSIA: 0
CONSTIPATION: 0
DYSURIA: 0
SLEEP DISTURBANCE: 0
COUGH: 0
CONFUSION: 1
DECREASED CONCENTRATION: 1
CHILLS: 0

## 2025-06-14 NOTE — PROGRESS NOTES
"Subjective   Patient ID: Estela Carnes is a 84 y.o. female who is assisted living/ home patient being seen at [facility], and evaluated for completion of assisted living paperwork.     HPI     Pt visited in apartment of assisted living. Pt oriented x1, up in wheelchair, comfortable and denies pain. Spouse present for visit. Pt answers simple yes and no questions, or responds with 2-3 words. Pt wears glasses and appears to hear normal tone of voice. States appetite is \"good\". Pt's weights have been stable last six months within 5 pounds. Pt denies headache, dizziness, congestion, cough, sore throat, and runny nose.     Pt denies chest tenderness, and sob at rest/exertion. Incontinent of bowel and bladder. Denies sleep disturbances. Pt wheelchair bound after a right hip fracture a year ago. Pt with a hx of frequent falls, no recent falls reported. Pt is a transfer with one assist. Pt total care for ADL's with exception of feeding. Pt is a poor historian, non-sensical communication at times, collateral information obtained from nursing and spouse.  9    Medications Ordered Prior to Encounter[1]     Review of Systems   Constitutional:  Negative for activity change, appetite change, chills, fatigue and fever.   HENT:  Negative for congestion and rhinorrhea.    Eyes:  Negative for visual disturbance.   Respiratory:  Negative for cough and shortness of breath.    Cardiovascular:  Negative for chest pain and leg swelling.   Gastrointestinal:  Negative for constipation, diarrhea, nausea and vomiting.   Endocrine: Negative for polydipsia, polyphagia and polyuria.   Genitourinary:  Negative for dysuria and frequency.   Musculoskeletal:  Positive for arthralgias, gait problem and myalgias.        Wheelchair bound, 1 person assist for transfers   Neurological:  Positive for weakness. Negative for dizziness, syncope, light-headedness and headaches.   Psychiatric/Behavioral:  Positive for confusion and decreased " "concentration. Negative for sleep disturbance. The patient is not nervous/anxious.        Objective   /62   Pulse 62   Resp 16   Ht 1.651 m (5' 5\")   Wt 59.2 kg (130 lb 8 oz)   SpO2 99%   BMI 21.72 kg/m²     Physical Exam  Constitutional:       General: She is awake. She is not in acute distress.     Appearance: Normal appearance. She is not ill-appearing.   HENT:      Head: Normocephalic.      Nose: Nose normal. No congestion or rhinorrhea.      Mouth/Throat:      Mouth: Mucous membranes are moist.   Eyes:      Conjunctiva/sclera: Conjunctivae normal.      Pupils: Pupils are equal, round, and reactive to light.   Cardiovascular:      Rate and Rhythm: Normal rate and regular rhythm.      Pulses: Normal pulses.      Heart sounds: Normal heart sounds.   Pulmonary:      Effort: Pulmonary effort is normal. No respiratory distress.      Breath sounds: Normal breath sounds. No wheezing or rhonchi.   Abdominal:      General: Bowel sounds are normal.      Palpations: Abdomen is soft.   Musculoskeletal:         General: Normal range of motion.      Cervical back: Normal range of motion.      Right lower leg: No edema.      Left lower leg: No edema.   Skin:     General: Skin is warm.      Capillary Refill: Capillary refill takes less than 2 seconds.   Neurological:      Mental Status: She is alert. Mental status is at baseline.      Comments: Oriented x1   Psychiatric:         Attention and Perception: Attention normal.         Speech: Speech normal.         Behavior: Behavior is cooperative.         Cognition and Memory: Cognition is impaired. Memory is impaired.         Assessment/Plan   Diagnoses and all orders for this visit:  Essential hypertension  Comments:  Blood pressure controlled.  Diabetes mellitus without complication  Comments:  Obtain HgbA1c.  Vascular dementia without behavioral disturbance, psychotic disturbance, mood disturbance, or anxiety, unspecified dementia severity " (Multi)  Comments:  Monitor for cognitve decline. Pt appropriate to live with spouse in AL, without spouse pt would require secured memory care unit for supervision/care.  Mixed hyperlipidemia  Comments:  Continue atorvastatin.  Living in assisted living  Comments:  Medical and facility chart reviewed, medications reconciled and collaboration with nursing. H&P paperwork completed for [facility] and [facility].               [1]   Current Outpatient Medications on File Prior to Visit   Medication Sig Dispense Refill    acetaminophen (Tylenol) 325 mg tablet Take 2 tablets (650 mg) by mouth every 4 hours if needed for headaches, fever (temp greater than 38.0 C) or mild pain (1 - 3). (Patient taking differently: Take 2 tablets (650 mg) by mouth every 6 hours if needed for headaches, fever (temp greater than 38.0 C) or mild pain (1 - 3). And 650mg po once daily.)      amLODIPine (Norvasc) 5 mg tablet Take 1 tablet (5 mg) by mouth once daily. Do not start before April 6, 2024.      apixaban (Eliquis) 2.5 mg tablet Take 1 tablet (2.5 mg) by mouth 2 times a day.      atorvastatin (Lipitor) 10 mg tablet Take 1 tablet (10 mg) by mouth once daily.      cholecalciferol (Vitamin D-3) 5,000 Units tablet Take 1 capsule by mouth once daily.      isosorbide mononitrate ER (Imdur) 30 mg 24 hr tablet Take 1 tablet (30 mg) by mouth once daily.      magnesium hydroxide (Milk of Magnesia) 400 mg/5 mL suspension Take 30 mL by mouth once daily as needed for constipation.      metFORMIN  mg 24 hr tablet Take 1 tablet (500 mg) by mouth 2 times daily (morning and late afternoon).      metoprolol succinate XL (Toprol-XL) 25 mg 24 hr tablet Take 1 tablet (25 mg) by mouth once daily. Do not crush or chew. Do not start before April 6, 2024.      PlaqueniL 200 mg tablet Take 1 tablet (200 mg) by mouth 2 times a day.      polyethylene glycol (Glycolax, Miralax) 17 gram packet Take 17 g by mouth once daily as needed (Constipation).      SITagliptin  phosphate (Januvia) 100 mg tablet Take 1 tablet (100 mg) by mouth once daily. Do not start before April 6, 2024.      vit C/E/Zn/coppr/lutein/zeaxan (PRESERVISION AREDS-2 ORAL) Take 1 tablet by mouth once daily.       No current facility-administered medications on file prior to visit.

## 2025-06-25 ENCOUNTER — APPOINTMENT (OUTPATIENT)
Dept: RADIOLOGY | Facility: HOSPITAL | Age: 85
End: 2025-06-25
Payer: COMMERCIAL

## 2025-06-25 ENCOUNTER — HOSPITAL ENCOUNTER (EMERGENCY)
Facility: HOSPITAL | Age: 85
Discharge: HOME | End: 2025-06-25
Payer: COMMERCIAL

## 2025-06-25 VITALS
HEART RATE: 70 BPM | RESPIRATION RATE: 18 BRPM | TEMPERATURE: 97.5 F | DIASTOLIC BLOOD PRESSURE: 65 MMHG | SYSTOLIC BLOOD PRESSURE: 168 MMHG | HEIGHT: 65 IN | BODY MASS INDEX: 21.66 KG/M2 | OXYGEN SATURATION: 97 % | WEIGHT: 130 LBS

## 2025-06-25 DIAGNOSIS — T17.308A CHOKING, INITIAL ENCOUNTER: Primary | ICD-10-CM

## 2025-06-25 PROCEDURE — 71046 X-RAY EXAM CHEST 2 VIEWS: CPT | Performed by: RADIOLOGY

## 2025-06-25 PROCEDURE — 99283 EMERGENCY DEPT VISIT LOW MDM: CPT | Mod: 25

## 2025-06-25 PROCEDURE — 71046 X-RAY EXAM CHEST 2 VIEWS: CPT

## 2025-06-25 ASSESSMENT — PAIN DESCRIPTION - PROGRESSION: CLINICAL_PROGRESSION: NOT CHANGED

## 2025-06-25 ASSESSMENT — PAIN - FUNCTIONAL ASSESSMENT: PAIN_FUNCTIONAL_ASSESSMENT: 0-10

## 2025-06-25 ASSESSMENT — PAIN SCALES - GENERAL: PAINLEVEL_OUTOF10: 0 - NO PAIN

## 2025-06-25 NOTE — DISCHARGE INSTRUCTIONS
Please to food until it is soft and mushy, easy to swallow.  Take smaller bites.  Increase oral hydration.  Be sure to take all medications, over the counter medications or prescription medications only as directed.    Be sure to follow up as directed in 1-2 days. All of the details of your follow up instructions are detailed in the follow up section of this packet.    If you are being discharged with any pains medications or muscle relaxers (norco, Vicodin, hydrocodone products, Percocet, oxycodone products, flexeril, cyclobenzaprine, robaxin, norflex, brand or generic, or any other pain controlling medications with the exception of Ibuprofen and regular Tylenol, do not drive or operate machinery, climb ladders or participate in any activity that could potentially put yourself or others at risk should you get dizzy, or be/feel impaired at all.    Return to emergency room without delay for ANY new or worsening pains or for any other symptoms or concerns. Return with worsening pains, nausea, vomiting, trouble breathing, palpitations, shortness of breath, inability to pass stool or urine, loss of control of stool or urine, any numbness or tingling (that is not normal for you), uncontrolled fevers, the passing of blood or other material in stool or urine, rashes, pains or for any other symptoms or concerns you may have. You are always welcome to return to the ER at any time for any reason or for any other concerns you may have.

## 2025-06-25 NOTE — ED PROVIDER NOTES
HPI   Chief Complaint   Patient presents with    Aspiration     Pt sent in from [place] for eval, pt had an episode of choking on a piece of food, vomited and then was fine. Nursing home staff sent pt in to be looked at. Vitals stable.       Patient is an 84-year-old female presenting via EMS from nursing facility with concern for choking episode.  Reportedly, she was in the dining hernandez during breakfast and had an episode of choking.  She proceeded to vomit up the food bolus and on EMS arrival, was at her baseline.  The facility just wanted the patient evaluated.  On arrival patient has no complaints.  Patient denies fevers, chills, cough, sore throat, runny nose, chest pain, shortness of breath, abdominal pain, nausea, vomiting, diarrhea or urinary complaints.              Patient History   Medical History[1]  Surgical History[2]  Family History[3]  Social History[4]    Physical Exam   ED Triage Vitals   Temp Pulse Resp BP   -- -- -- --      SpO2 Temp src Heart Rate Source Patient Position   -- -- -- --      BP Location FiO2 (%)     -- --       Physical Exam  Vitals and nursing note reviewed.   Constitutional:       Appearance: She is well-developed.      Comments: Awake, laying in examination bed   HENT:      Head: Normocephalic and atraumatic.      Nose: Nose normal.      Mouth/Throat:      Mouth: Mucous membranes are moist.      Pharynx: Oropharynx is clear.   Eyes:      Extraocular Movements: Extraocular movements intact.      Conjunctiva/sclera: Conjunctivae normal.      Pupils: Pupils are equal, round, and reactive to light.   Cardiovascular:      Rate and Rhythm: Normal rate and regular rhythm.      Pulses: Normal pulses.      Heart sounds: Normal heart sounds. No murmur heard.  Pulmonary:      Effort: Pulmonary effort is normal. No respiratory distress.      Breath sounds: Normal breath sounds.   Abdominal:      General: Abdomen is flat.      Palpations: Abdomen is soft.      Tenderness: There is no  abdominal tenderness.   Musculoskeletal:         General: No swelling. Normal range of motion.      Cervical back: Normal range of motion and neck supple.      Comments: Shirt covered in emesis.   Skin:     General: Skin is warm and dry.      Capillary Refill: Capillary refill takes less than 2 seconds.   Neurological:      General: No focal deficit present.      Mental Status: She is alert and oriented to person, place, and time.   Psychiatric:         Mood and Affect: Mood normal.         Behavior: Behavior normal.           ED Course & MDM   Diagnoses as of 06/25/25 2230   Choking, initial encounter                 No data recorded                                 Medical Decision Making  Patient is an 84-year-old female presenting via EMS from nursing facility with concern for choking episode.  P.o. challenge recommended.  Conditions considered include but are not limited: Choking, food bolus.    At this time, patient is tolerating secretions.  Chest x-ray ordered to rule out aspiration.  Chest x-ray without acute cardiopulmonary process.  No choking episodes in the emergency department.  I believe this patient is at low risk for complication, and a disposition of discharge is acceptable.  Return to the Emergency Department if new or worsening symptoms including headache, fever, chills, chest pain, shortness of breath, syncope, near syncope, abdominal pain, nausea, vomiting,  diarrhea, or worsening pain.  Patient is agreeable to be discharged back to their facility.    Portions of this note made with Dragon software, please be mindful of potential grammatical errors.      XR chest 2 views   Final Result   No new active disease in the chest. Stable bandlike right mid lung   opacities.        MACRO:   None        Signed by: Ulices Leblanc 6/25/2025 12:41 PM   Dictation workstation:   [id number]            Procedure  Procedures         [1]   Past Medical History:  Diagnosis Date    A-fib (Multi)     had successful  cardioversion    Adrenal nodule     1.7 cm stable on CT , US  per Dr. Brooke nodule resolved    Closed fracture of metatarsal bone 2024    Diabetes mellitus, type 2 (Multi)     DJD (degenerative joint disease)     Foot fracture, left 10/2020    stepped off the curb    H/O colonoscopy     Dr Alejandre  int hemorhoids, diverticulosis, saw Dr Blue and was told no further colonoscopies needed    H/O mammogram 2019    WNL    H/O sleep study     negative    History of stress test      Dr. Mckee neg, heart monitor per Dr.Van Bill , stress test neg 2019    HTN (hypertension)     L5 vertebral fracture (Multi)     had vertebroplasty    Macular degeneration, bilateral     Osteopenia     DEXA , DEXA , 10/2016 , 2019,hip -2.2, per Dr Anu Thao, gets Prolia    Vertigo    [2]   Past Surgical History:  Procedure Laterality Date    BIOPSY Right 2019    Right lung nodule     SECTION, CLASSIC      CHOLECYSTECTOMY      CT GUIDED IMAGING FOR NEEDLE PLACEMENT  2018    CT GUIDED IMAGING FOR NEEDLE PLACEMENT [facility]    CT GUIDED IMAGING FOR NEEDLE PLACEMENT  2019    CT GUIDED IMAGING FOR NEEDLE PLACEMENT [facility]    MR HEAD ANGIO WO IV CONTRAST  10/07/2021    MR HEAD ANGIO WO IV CONTRAST [facility]    MR NECK ANGIO WO IV CONTRAST  10/07/2021    MR NECK ANGIO WO IV CONTRAST [facility]    OVARIAN CYST REMOVAL     [3]   Family History  Problem Relation Name Age of Onset    Diabetes Mother      Heart disease Father     [4]   Social History  Tobacco Use    Smoking status: Never     Passive exposure: Never    Smokeless tobacco: Never   Substance Use Topics    Alcohol use: Not on file    Drug use: Never        Diego Nascimento PA-C  [phone]

## 2025-06-27 ENCOUNTER — NURSING HOME VISIT (OUTPATIENT)
Dept: POST ACUTE CARE | Facility: EXTERNAL LOCATION | Age: 85
End: 2025-06-27
Payer: COMMERCIAL

## 2025-06-27 DIAGNOSIS — T17.908A ASPIRATION INTO AIRWAY, INITIAL ENCOUNTER: Primary | ICD-10-CM

## 2025-06-27 DIAGNOSIS — Z78.9 LIVING IN ASSISTED LIVING: ICD-10-CM

## 2025-06-27 PROCEDURE — 99348 HOME/RES VST EST LOW MDM 30: CPT

## 2025-06-27 ASSESSMENT — PAIN SCALES - GENERAL: PAINLEVEL_OUTOF10: 0-NO PAIN

## 2025-06-27 NOTE — LETTER
"Patient: Estela Carnes  : 1940    Encounter Date: 2025    Subjective  Patient ID: Estela Carnes is a 84 y.o. female who is assisted living/ home patient being seen at [facility], and evaluated for ER follow up for choking episode.     HPI     Pt visited in apartment of assisted living after an ER visit for choking episode during breakfast. Spouse states \"she was talking one minute and then just went out\". Nursing reports pt started choking and had an episode of emesis. Pt was sent to ER to r/o aspiration. 2-view chest xray was obtained and negative for any significant findings. Pt and spouse both are poor historians.  Pt unable to recall event or going to ER at this time. Pt states \"what happened\". Pt answers questions with \"I don't know\" or is hesitant with responding yes and no\". Nursing and spouse confirms pt has not had any further episodes. Pt denies headaches, nausea/vomiting, changes in appetite,  chest tenderness, and sob. Pt will be moving to another AL next month. PT/OT/ST was recommended as needed on admission paperwork.     Medications Ordered Prior to Encounter[1]     Review of Systems   Constitutional:  Negative for activity change, appetite change, chills, fatigue and fever.   HENT:  Negative for congestion, dental problem, drooling, rhinorrhea, sore throat and trouble swallowing.    Eyes:  Negative for visual disturbance.   Respiratory:  Negative for cough and shortness of breath.    Cardiovascular:  Negative for chest pain and leg swelling.   Gastrointestinal:  Negative for constipation, diarrhea, nausea and vomiting.   Endocrine: Negative for polydipsia, polyphagia and polyuria.   Genitourinary:  Negative for dysuria and frequency.   Musculoskeletal:  Positive for arthralgias, gait problem and myalgias.        Wheelchair bound, 1 person assist for transfers   Neurological:  Positive for weakness. Negative for dizziness, syncope, light-headedness and headaches. "   Psychiatric/Behavioral:  Positive for confusion and decreased concentration. Negative for sleep disturbance. The patient is not nervous/anxious.        Objective  There were no vitals taken for this visit.    Physical Exam  Constitutional:       General: She is awake. She is not in acute distress.     Appearance: Normal appearance. She is not ill-appearing.   HENT:      Head: Normocephalic.      Nose: Nose normal. No congestion or rhinorrhea.      Mouth/Throat:      Mouth: Mucous membranes are moist.   Eyes:      Conjunctiva/sclera: Conjunctivae normal.      Pupils: Pupils are equal, round, and reactive to light.   Cardiovascular:      Rate and Rhythm: Normal rate and regular rhythm.      Pulses: Normal pulses.      Heart sounds: Normal heart sounds.   Pulmonary:      Effort: Pulmonary effort is normal. No respiratory distress.      Breath sounds: Normal breath sounds. No wheezing or rhonchi.   Abdominal:      General: Bowel sounds are normal.      Palpations: Abdomen is soft.   Musculoskeletal:         General: Normal range of motion.      Cervical back: Normal range of motion.      Right lower leg: No edema.      Left lower leg: No edema.   Skin:     General: Skin is warm.      Capillary Refill: Capillary refill takes less than 2 seconds.   Neurological:      Mental Status: She is alert. Mental status is at baseline.      Comments: Oriented x1   Psychiatric:         Attention and Perception: Attention normal.         Speech: Speech normal.         Behavior: Behavior is cooperative.         Cognition and Memory: Cognition is impaired. Memory is impaired.         Assessment/Plan  Diagnoses and all orders for this visit:  Aspiration into airway, initial encounter  Comments:  Chest xray obtained at hospital negative for aspiration. Nursing to monitor patient for any events of dysphagia.  Living in assisted living  Comments:  Medical and facility chart reviewed, medication reconciled and collaboration with nursing.  Pt moving next month to [place].              Electronically Signed By: JODY Calloway-CNP   7/1/25  1:39 AM       [1]  Current Outpatient Medications on File Prior to Visit   Medication Sig Dispense Refill   • acetaminophen (Tylenol) 325 mg tablet Take 2 tablets (650 mg) by mouth every 4 hours if needed for headaches, fever (temp greater than 38.0 C) or mild pain (1 - 3). (Patient taking differently: Take 2 tablets (650 mg) by mouth every 6 hours if needed for headaches, fever (temp greater than 38.0 C) or mild pain (1 - 3). And 650mg po once daily.)     • amLODIPine (Norvasc) 5 mg tablet Take 1 tablet (5 mg) by mouth once daily. Do not start before April 6, 2024.     • apixaban (Eliquis) 2.5 mg tablet Take 1 tablet (2.5 mg) by mouth 2 times a day.     • atorvastatin (Lipitor) 10 mg tablet Take 1 tablet (10 mg) by mouth once daily.     • cholecalciferol (Vitamin D-3) 5,000 Units tablet Take 1 capsule by mouth once daily.     • isosorbide mononitrate ER (Imdur) 30 mg 24 hr tablet Take 1 tablet (30 mg) by mouth once daily.     • magnesium hydroxide (Milk of Magnesia) 400 mg/5 mL suspension Take 30 mL by mouth once daily as needed for constipation.     • metFORMIN  mg 24 hr tablet Take 1 tablet (500 mg) by mouth 2 times daily (morning and late afternoon).     • metoprolol succinate XL (Toprol-XL) 25 mg 24 hr tablet Take 1 tablet (25 mg) by mouth once daily. Do not crush or chew. Do not start before April 6, 2024.     • PlaqueniL 200 mg tablet Take 1 tablet (200 mg) by mouth 2 times a day.     • polyethylene glycol (Glycolax, Miralax) 17 gram packet Take 17 g by mouth once daily as needed (Constipation).     • SITagliptin phosphate (Januvia) 100 mg tablet Take 1 tablet (100 mg) by mouth once daily. Do not start before April 6, 2024.     • vit C/E/Zn/coppr/lutein/zeaxan (PRESERVISION AREDS-2 ORAL) Take 1 tablet by mouth once daily.       No current facility-administered medications on file prior  to visit.

## 2025-07-01 ASSESSMENT — ENCOUNTER SYMPTOMS
POLYPHAGIA: 0
DIZZINESS: 0
MYALGIAS: 1
ACTIVITY CHANGE: 0
SORE THROAT: 0
DECREASED CONCENTRATION: 1
FREQUENCY: 0
CONSTIPATION: 0
SLEEP DISTURBANCE: 0
FEVER: 0
NAUSEA: 0
DYSURIA: 0
SHORTNESS OF BREATH: 0
VOMITING: 0
POLYDIPSIA: 0
NERVOUS/ANXIOUS: 0
COUGH: 0
DIARRHEA: 0
CONFUSION: 1
RHINORRHEA: 0
HEADACHES: 0
FATIGUE: 0
TROUBLE SWALLOWING: 0
LIGHT-HEADEDNESS: 0
CHILLS: 0
ARTHRALGIAS: 1
WEAKNESS: 1
APPETITE CHANGE: 0

## 2025-07-01 NOTE — PROGRESS NOTES
"Subjective   Patient ID: Esteal Carnes is a 84 y.o. female who is assisted living/ home patient being seen at [facility], and evaluated for ER follow up for choking episode.     HPI     Pt visited in apartment of assisted living after an ER visit for choking episode during breakfast. Spouse states \"she was talking one minute and then just went out\". Nursing reports pt started choking and had an episode of emesis. Pt was sent to ER to r/o aspiration. 2-view chest xray was obtained and negative for any significant findings. Pt and spouse both are poor historians.  Pt unable to recall event or going to ER at this time. Pt states \"what happened\". Pt answers questions with \"I don't know\" or is hesitant with responding yes and no\". Nursing and spouse confirms pt has not had any further episodes. Pt denies headaches, nausea/vomiting, changes in appetite,  chest tenderness, and sob. Pt will be moving to another AL next month. PT/OT/ST was recommended as needed on admission paperwork.     Medications Ordered Prior to Encounter[1]     Review of Systems   Constitutional:  Negative for activity change, appetite change, chills, fatigue and fever.   HENT:  Negative for congestion, dental problem, drooling, rhinorrhea, sore throat and trouble swallowing.    Eyes:  Negative for visual disturbance.   Respiratory:  Negative for cough and shortness of breath.    Cardiovascular:  Negative for chest pain and leg swelling.   Gastrointestinal:  Negative for constipation, diarrhea, nausea and vomiting.   Endocrine: Negative for polydipsia, polyphagia and polyuria.   Genitourinary:  Negative for dysuria and frequency.   Musculoskeletal:  Positive for arthralgias, gait problem and myalgias.        Wheelchair bound, 1 person assist for transfers   Neurological:  Positive for weakness. Negative for dizziness, syncope, light-headedness and headaches.   Psychiatric/Behavioral:  Positive for confusion and decreased concentration. " Negative for sleep disturbance. The patient is not nervous/anxious.        Objective   There were no vitals taken for this visit.    Physical Exam  Constitutional:       General: She is awake. She is not in acute distress.     Appearance: Normal appearance. She is not ill-appearing.   HENT:      Head: Normocephalic.      Nose: Nose normal. No congestion or rhinorrhea.      Mouth/Throat:      Mouth: Mucous membranes are moist.   Eyes:      Conjunctiva/sclera: Conjunctivae normal.      Pupils: Pupils are equal, round, and reactive to light.   Cardiovascular:      Rate and Rhythm: Normal rate and regular rhythm.      Pulses: Normal pulses.      Heart sounds: Normal heart sounds.   Pulmonary:      Effort: Pulmonary effort is normal. No respiratory distress.      Breath sounds: Normal breath sounds. No wheezing or rhonchi.   Abdominal:      General: Bowel sounds are normal.      Palpations: Abdomen is soft.   Musculoskeletal:         General: Normal range of motion.      Cervical back: Normal range of motion.      Right lower leg: No edema.      Left lower leg: No edema.   Skin:     General: Skin is warm.      Capillary Refill: Capillary refill takes less than 2 seconds.   Neurological:      Mental Status: She is alert. Mental status is at baseline.      Comments: Oriented x1   Psychiatric:         Attention and Perception: Attention normal.         Speech: Speech normal.         Behavior: Behavior is cooperative.         Cognition and Memory: Cognition is impaired. Memory is impaired.         Assessment/Plan   Diagnoses and all orders for this visit:  Aspiration into airway, initial encounter  Comments:  Chest xray obtained at hospital negative for aspiration. Nursing to monitor patient for any events of dysphagia.  Living in assisted living  Comments:  Medical and facility chart reviewed, medication reconciled and collaboration with nursing. Pt moving next month to [place]               [1]   Current Outpatient  Medications on File Prior to Visit   Medication Sig Dispense Refill    acetaminophen (Tylenol) 325 mg tablet Take 2 tablets (650 mg) by mouth every 4 hours if needed for headaches, fever (temp greater than 38.0 C) or mild pain (1 - 3). (Patient taking differently: Take 2 tablets (650 mg) by mouth every 6 hours if needed for headaches, fever (temp greater than 38.0 C) or mild pain (1 - 3). And 650mg po once daily.)      amLODIPine (Norvasc) 5 mg tablet Take 1 tablet (5 mg) by mouth once daily. Do not start before April 6, 2024.      apixaban (Eliquis) 2.5 mg tablet Take 1 tablet (2.5 mg) by mouth 2 times a day.      atorvastatin (Lipitor) 10 mg tablet Take 1 tablet (10 mg) by mouth once daily.      cholecalciferol (Vitamin D-3) 5,000 Units tablet Take 1 capsule by mouth once daily.      isosorbide mononitrate ER (Imdur) 30 mg 24 hr tablet Take 1 tablet (30 mg) by mouth once daily.      magnesium hydroxide (Milk of Magnesia) 400 mg/5 mL suspension Take 30 mL by mouth once daily as needed for constipation.      metFORMIN  mg 24 hr tablet Take 1 tablet (500 mg) by mouth 2 times daily (morning and late afternoon).      metoprolol succinate XL (Toprol-XL) 25 mg 24 hr tablet Take 1 tablet (25 mg) by mouth once daily. Do not crush or chew. Do not start before April 6, 2024.      PlaqueniL 200 mg tablet Take 1 tablet (200 mg) by mouth 2 times a day.      polyethylene glycol (Glycolax, Miralax) 17 gram packet Take 17 g by mouth once daily as needed (Constipation).      SITagliptin phosphate (Januvia) 100 mg tablet Take 1 tablet (100 mg) by mouth once daily. Do not start before April 6, 2024.      vit C/E/Zn/coppr/lutein/zeaxan (PRESERVISION AREDS-2 ORAL) Take 1 tablet by mouth once daily.       No current facility-administered medications on file prior to visit.

## 2025-07-09 ENCOUNTER — APPOINTMENT (OUTPATIENT)
Dept: RADIOLOGY | Facility: HOSPITAL | Age: 85
End: 2025-07-09
Payer: COMMERCIAL

## 2025-07-09 ENCOUNTER — HOSPITAL ENCOUNTER (EMERGENCY)
Facility: HOSPITAL | Age: 85
Discharge: NURSING, REHAB OR LONG-TERM CARE | End: 2025-07-09
Attending: STUDENT IN AN ORGANIZED HEALTH CARE EDUCATION/TRAINING PROGRAM
Payer: COMMERCIAL

## 2025-07-09 VITALS
RESPIRATION RATE: 18 BRPM | DIASTOLIC BLOOD PRESSURE: 113 MMHG | TEMPERATURE: 96.8 F | WEIGHT: 130 LBS | HEART RATE: 78 BPM | HEIGHT: 65 IN | BODY MASS INDEX: 21.66 KG/M2 | OXYGEN SATURATION: 96 % | SYSTOLIC BLOOD PRESSURE: 149 MMHG

## 2025-07-09 DIAGNOSIS — S09.90XA CLOSED HEAD INJURY, INITIAL ENCOUNTER: Primary | ICD-10-CM

## 2025-07-09 DIAGNOSIS — S82.54XA CLOSED NONDISPLACED FRACTURE OF MEDIAL MALLEOLUS OF RIGHT TIBIA, INITIAL ENCOUNTER: ICD-10-CM

## 2025-07-09 DIAGNOSIS — S82.831A CLOSED FRACTURE OF DISTAL END OF RIGHT FIBULA, UNSPECIFIED FRACTURE MORPHOLOGY, INITIAL ENCOUNTER: ICD-10-CM

## 2025-07-09 LAB
ANION GAP SERPL CALCULATED.3IONS-SCNC: 11 MMOL/L (ref 10–20)
BASOPHILS # BLD AUTO: 0.06 X10*3/UL (ref 0–0.1)
BASOPHILS NFR BLD AUTO: 0.8 %
BUN SERPL-MCNC: 11 MG/DL (ref 6–23)
CALCIUM SERPL-MCNC: 8.9 MG/DL (ref 8.6–10.3)
CHLORIDE SERPL-SCNC: 105 MMOL/L (ref 98–107)
CO2 SERPL-SCNC: 28 MMOL/L (ref 21–32)
CREAT SERPL-MCNC: 0.5 MG/DL (ref 0.5–1.05)
EGFRCR SERPLBLD CKD-EPI 2021: >90 ML/MIN/1.73M*2
EOSINOPHIL # BLD AUTO: 0.28 X10*3/UL (ref 0–0.4)
EOSINOPHIL NFR BLD AUTO: 3.5 %
ERYTHROCYTE [DISTWIDTH] IN BLOOD BY AUTOMATED COUNT: 12.8 % (ref 11.5–14.5)
GLUCOSE SERPL-MCNC: 125 MG/DL (ref 74–99)
HCT VFR BLD AUTO: 37.4 % (ref 36–46)
HGB BLD-MCNC: 11.9 G/DL (ref 12–16)
IMM GRANULOCYTES # BLD AUTO: 0.06 X10*3/UL (ref 0–0.5)
IMM GRANULOCYTES NFR BLD AUTO: 0.8 % (ref 0–0.9)
LYMPHOCYTES # BLD AUTO: 1.3 X10*3/UL (ref 0.8–3)
LYMPHOCYTES NFR BLD AUTO: 16.3 %
MCH RBC QN AUTO: 26.8 PG (ref 26–34)
MCHC RBC AUTO-ENTMCNC: 31.8 G/DL (ref 32–36)
MCV RBC AUTO: 84 FL (ref 80–100)
MONOCYTES # BLD AUTO: 0.56 X10*3/UL (ref 0.05–0.8)
MONOCYTES NFR BLD AUTO: 7 %
NEUTROPHILS # BLD AUTO: 5.73 X10*3/UL (ref 1.6–5.5)
NEUTROPHILS NFR BLD AUTO: 71.6 %
NRBC BLD-RTO: 0 /100 WBCS (ref 0–0)
PLATELET # BLD AUTO: 304 X10*3/UL (ref 150–450)
POTASSIUM SERPL-SCNC: 4.2 MMOL/L (ref 3.5–5.3)
RBC # BLD AUTO: 4.44 X10*6/UL (ref 4–5.2)
SODIUM SERPL-SCNC: 140 MMOL/L (ref 136–145)
WBC # BLD AUTO: 8 X10*3/UL (ref 4.4–11.3)

## 2025-07-09 PROCEDURE — 36415 COLL VENOUS BLD VENIPUNCTURE: CPT | Performed by: STUDENT IN AN ORGANIZED HEALTH CARE EDUCATION/TRAINING PROGRAM

## 2025-07-09 PROCEDURE — 70450 CT HEAD/BRAIN W/O DYE: CPT | Performed by: RADIOLOGY

## 2025-07-09 PROCEDURE — 99285 EMERGENCY DEPT VISIT HI MDM: CPT | Mod: 25 | Performed by: STUDENT IN AN ORGANIZED HEALTH CARE EDUCATION/TRAINING PROGRAM

## 2025-07-09 PROCEDURE — 73562 X-RAY EXAM OF KNEE 3: CPT | Mod: RT

## 2025-07-09 PROCEDURE — 73502 X-RAY EXAM HIP UNI 2-3 VIEWS: CPT | Mod: RIGHT SIDE | Performed by: RADIOLOGY

## 2025-07-09 PROCEDURE — 73610 X-RAY EXAM OF ANKLE: CPT | Mod: RT

## 2025-07-09 PROCEDURE — 85025 COMPLETE CBC W/AUTO DIFF WBC: CPT | Performed by: STUDENT IN AN ORGANIZED HEALTH CARE EDUCATION/TRAINING PROGRAM

## 2025-07-09 PROCEDURE — 96360 HYDRATION IV INFUSION INIT: CPT | Mod: 59

## 2025-07-09 PROCEDURE — 73562 X-RAY EXAM OF KNEE 3: CPT | Mod: RIGHT SIDE | Performed by: RADIOLOGY

## 2025-07-09 PROCEDURE — 73610 X-RAY EXAM OF ANKLE: CPT | Mod: RIGHT SIDE | Performed by: RADIOLOGY

## 2025-07-09 PROCEDURE — 70450 CT HEAD/BRAIN W/O DYE: CPT

## 2025-07-09 PROCEDURE — 29505 APPLICATION LONG LEG SPLINT: CPT

## 2025-07-09 PROCEDURE — 2500000004 HC RX 250 GENERAL PHARMACY W/ HCPCS (ALT 636 FOR OP/ED): Performed by: STUDENT IN AN ORGANIZED HEALTH CARE EDUCATION/TRAINING PROGRAM

## 2025-07-09 PROCEDURE — 80048 BASIC METABOLIC PNL TOTAL CA: CPT | Performed by: STUDENT IN AN ORGANIZED HEALTH CARE EDUCATION/TRAINING PROGRAM

## 2025-07-09 PROCEDURE — 2500000001 HC RX 250 WO HCPCS SELF ADMINISTERED DRUGS (ALT 637 FOR MEDICARE OP): Performed by: STUDENT IN AN ORGANIZED HEALTH CARE EDUCATION/TRAINING PROGRAM

## 2025-07-09 PROCEDURE — 96361 HYDRATE IV INFUSION ADD-ON: CPT | Mod: 59

## 2025-07-09 PROCEDURE — 73502 X-RAY EXAM HIP UNI 2-3 VIEWS: CPT | Mod: RT

## 2025-07-09 RX ORDER — ACETAMINOPHEN 325 MG/1
650 TABLET ORAL ONCE
Status: COMPLETED | OUTPATIENT
Start: 2025-07-09 | End: 2025-07-09

## 2025-07-09 RX ADMIN — ACETAMINOPHEN 650 MG: 325 TABLET ORAL at 07:40

## 2025-07-09 RX ADMIN — SODIUM CHLORIDE 500 ML: 900 INJECTION, SOLUTION INTRAVENOUS at 07:40

## 2025-07-09 ASSESSMENT — PAIN SCALES - GENERAL
PAINLEVEL_OUTOF10: 3
PAINLEVEL_OUTOF10: 8

## 2025-07-09 ASSESSMENT — PAIN - FUNCTIONAL ASSESSMENT
PAIN_FUNCTIONAL_ASSESSMENT: 0-10
PAIN_FUNCTIONAL_ASSESSMENT: 0-10

## 2025-07-09 NOTE — ED NOTES
Took report from BRICE Pacheco. Assessed pt and she has stable vitals. Pt states she is not in any pain.     Khushi Anglin RN  07/09/25 [id number]

## 2025-07-09 NOTE — DISCHARGE INSTRUCTIONS
Patient should not bear weight. Keep her in a wheelchair. FRIDAY Dr. Penn can see her but staff must call for the appointment time.

## 2025-07-09 NOTE — ED PROVIDER NOTES
"  Emergency Department Provider Note              [facility] EMERGENCY MEDICINE  Emergency Department        Pt Name: Estela Carnes  MRN: [id number]  Birthdate 1940  Date of evaluation: [unfilled]    CHIEF COMPLAINT       Chief Complaint   Patient presents with    Fall       OF PRESENT ILLNESS  (Location/Symptom, Timing/Onset, Context/Setting, Quality, Duration, ModifyingFactors, Severity.)      Estela Carnes is a 84 y.o. female who presents with ground-level fall.  Patient came in by EMS as a fall with head strike but no LOC and pain to the right hip right knee right ankle.  Reportedly she is a full code.  Has dementia and at her baseline is ANO x 2.  Patient arrives hemodynamically stable complaining of some head pain she cannot give me a full history many questions that I asked her if she reports \"I do not know\".  In examining her she does have reproducible pain on her right knee right hip and it does appear slightly shortened and rotated.  Hematoma on the left scalp.  Reportedly she is on blood thinners.  Reviewed her paperwork from EMS the patient reportedly does use a Danis lift when needed and has difficulty with ambulation at baseline.  She is from a skilled nursing facility [place]    PAST MEDICAL / SURGICAL / SOCIAL / FAMILY HISTORY      has a past medical history of A-fib (Multi), Adrenal nodule, Closed fracture of metatarsal bone (2024), Diabetes mellitus, type 2 (Multi), DJD (degenerative joint disease), Foot fracture, left (10/2020), H/O colonoscopy, H/O mammogram (2019), H/O sleep study (), History of stress test, HTN (hypertension), L5 vertebral fracture (Multi), Macular degeneration, bilateral, Osteopenia, and Vertigo.     has a past surgical history that includes CT guided imaging for needle placement (2018); CT guided imaging for needle placement (2019); MR angio head wo IV contrast (10/07/2021); MR angio neck wo IV contrast (10/07/2021);  " section, classic; Cholecystectomy; Ovarian cyst removal; and biopsy (Right, 06/17/2019).    Social History     Socioeconomic History    Marital status:      Spouse name: Not on file    Number of children: Not on file    Years of education: Not on file    Highest education level: Not on file   Occupational History    Not on file   Tobacco Use    Smoking status: Never     Passive exposure: Never    Smokeless tobacco: Never   Substance and Sexual Activity    Alcohol use: Not on file    Drug use: Never    Sexual activity: Not on file   Other Topics Concern    Not on file   Social History Narrative    Not on file     Social Drivers of Health     Financial Resource Strain: Low Risk  (4/1/2024)    Overall Financial Resource Strain (CARDIA)     Difficulty of Paying Living Expenses: Not hard at all   Food Insecurity: No Food Insecurity (12/28/2023)    Hunger Vital Sign     Worried About Running Out of Food in the Last Year: Never true     Ran Out of Food in the Last Year: Never true   Transportation Needs: No Transportation Needs (4/1/2024)    PRAPARE - Transportation     Lack of Transportation (Medical): No     Lack of Transportation (Non-Medical): No   Physical Activity: Not on file   Stress: Not on file   Social Connections: Not on file   Intimate Partner Violence: Not At Risk (12/28/2023)    Humiliation, Afraid, Rape, and Kick questionnaire     Fear of Current or Ex-Partner: No     Emotionally Abused: No     Physically Abused: No     Sexually Abused: No   Housing Stability: Low Risk  (4/1/2024)    Housing Stability Vital Sign     Unable to Pay for Housing in the Last Year: No     Number of Places Lived in the Last Year: 1     Unstable Housing in the Last Year: No       Family History[1]    Allergies:  Ciprofloxacin    Home Medications:  Prior to Admission medications    Medication Sig Start Date End Date Taking? Authorizing Provider   acetaminophen (Tylenol) 325 mg tablet Take 2 tablets (650 mg) by mouth every 4  hours if needed for headaches, fever (temp greater than 38.0 C) or mild pain (1 - 3).  Patient taking differently: Take 2 tablets (650 mg) by mouth every 6 hours if needed for headaches, fever (temp greater than 38.0 C) or mild pain (1 - 3). And 650mg po once daily.    Historical Provider, MD   amLODIPine (Norvasc) 5 mg tablet Take 1 tablet (5 mg) by mouth once daily. Do not start before April 6, 2024. 4/6/24   Froylan Collins MD   apixaban (Eliquis) 2.5 mg tablet Take 1 tablet (2.5 mg) by mouth 2 times a day. 4/5/24   Froylan Collins MD   atorvastatin (Lipitor) 10 mg tablet Take 1 tablet (10 mg) by mouth once daily. 5/15/13   Historical Provider, MD   cholecalciferol (Vitamin D-3) 5,000 Units tablet Take 1 capsule by mouth once daily. 1/9/13   Historical Provider, MD   isosorbide mononitrate ER (Imdur) 30 mg 24 hr tablet Take 1 tablet (30 mg) by mouth once daily. 5/20/22   Historical Provider, MD   magnesium hydroxide (Milk of Magnesia) 400 mg/5 mL suspension Take 30 mL by mouth once daily as needed for constipation.    Historical Provider, MD   metFORMIN  mg 24 hr tablet Take 1 tablet (500 mg) by mouth 2 times daily (morning and late afternoon). 6/10/16   Historical Provider, MD   metoprolol succinate XL (Toprol-XL) 25 mg 24 hr tablet Take 1 tablet (25 mg) by mouth once daily. Do not crush or chew. Do not start before April 6, 2024. 4/6/24   Froylan Collins MD   PlaqueniL 200 mg tablet Take 1 tablet (200 mg) by mouth 2 times a day. 1/10/22   Historical Provider, MD   polyethylene glycol (Glycolax, Miralax) 17 gram packet Take 17 g by mouth once daily as needed (Constipation). 4/5/24   Froylan Collins MD   SITagliptin phosphate (Januvia) 100 mg tablet Take 1 tablet (100 mg) by mouth once daily. Do not start before April 6, 2024. 4/6/24   Froylan Collins MD   vit C/E/Zn/coppr/lutein/zeaxan (PRESERVISION AREDS-2 ORAL) Take 1 tablet by mouth once daily.    Historical Provider, MD  "      REVIEW OF SYSTEMS    (2-9 systems for level 4, 10 or more for level 5)     Review of Systems    PHYSICAL EXAM   (up to 7 for level 4, 8 or more for level 5)     INITIAL VITALS:   /85   Pulse 82   Temp 36 °C (96.8 °F) (Temporal)   Resp 18   Ht 1.651 m (5' 5\")   Wt 59 kg (130 lb)   SpO2 97%   BMI 21.63 kg/m²     Physical Exam    PHYSICAL EXAM    General: No distress, sitting upright, confused  Skin: bruising on right lower leg, knee  Head: scalp hematoma left sided parietal  Neck: Supple  Eye: EOMI, normal conjunctiva  ENT: Moist oral mucosa, nares patent  CVS: RRR, 2+ pulses radial  Pulm: Non labored  Back: Normal ROM  MSK: right knee bruising, painful to ROM, right hip pain on lateral compression, right ankle pain  GI: non distended  Neuro: A&O x 2, no focal neuro deficits  Pysch: Appropriate affect       DIAGNOSIS         (LABS / IMAGING / EKG):  Orders Placed This Encounter   Procedures    CT head wo IV contrast    XR hip right with pelvis when performed 2 or 3 views    XR knee right 3 views    XR ankle right 3+ views    CBC and Auto Differential    Basic metabolic panel       MEDICATIONS ORDERED:  [unfilled]    RESULTS / EMERGENCY DEPARTMENT COURSE / MDM   :  Results for orders placed or performed during the hospital encounter of 07/09/25   CBC and Auto Differential    Collection Time: 07/09/25  7:26 AM   Result Value Ref Range    WBC 8.0 4.4 - 11.3 x10*3/uL    nRBC 0.0 0.0 - 0.0 /100 WBCs    RBC 4.44 4.00 - 5.20 x10*6/uL    Hemoglobin 11.9 (L) 12.0 - 16.0 g/dL    Hematocrit 37.4 36.0 - 46.0 %    MCV 84 80 - 100 fL    MCH 26.8 26.0 - 34.0 pg    MCHC 31.8 (L) 32.0 - 36.0 g/dL    RDW 12.8 11.5 - 14.5 %    Platelets 304 150 - 450 x10*3/uL    Neutrophils % 71.6 40.0 - 80.0 %    Immature Granulocytes %, Automated 0.8 0.0 - 0.9 %    Lymphocytes % 16.3 13.0 - 44.0 %    Monocytes % 7.0 2.0 - 10.0 %    Eosinophils % 3.5 0.0 - 6.0 %    Basophils % 0.8 0.0 - 2.0 %    Neutrophils Absolute 5.73 (H) 1.60 - " 5.50 x10*3/uL    Immature Granulocytes Absolute, Automated 0.06 0.00 - 0.50 x10*3/uL    Lymphocytes Absolute 1.30 0.80 - 3.00 x10*3/uL    Monocytes Absolute 0.56 0.05 - 0.80 x10*3/uL    Eosinophils Absolute 0.28 0.00 - 0.40 x10*3/uL    Basophils Absolute 0.06 0.00 - 0.10 x10*3/uL   Basic metabolic panel    Collection Time: 07/09/25  7:26 AM   Result Value Ref Range    Glucose 125 (H) 74 - 99 mg/dL    Sodium 140 136 - 145 mmol/L    Potassium 4.2 3.5 - 5.3 mmol/L    Chloride 105 98 - 107 mmol/L    Bicarbonate 28 21 - 32 mmol/L    Anion Gap 11 10 - 20 mmol/L    Urea Nitrogen 11 6 - 23 mg/dL    Creatinine 0.50 0.50 - 1.05 mg/dL    eGFR >90 >60 mL/min/1.73m*2    Calcium 8.9 8.6 - 10.3 mg/dL     *Note: Due to a large number of results and/or encounters for the requested time period, some results have not been displayed. A complete set of results can be found in Results Review.       IMPRESSION:     RADIOLOGY:      EKG:      POC ULTRASOUND:      EMERGENCY DEPARTMENT COURSE:  84-year-old female who had a fall this morning  She is at her baseline ANO x 2  Vital signs are stable  Clinically the patient has a bruise to her right knee and swelling and pain  She also has injury to her right ankle and right hip  She has a small hematoma on the left parietal scalp  CT head was ordered showing no signs of acute traumatic injury she does have age-related changes and brain atrophy consistent with dementia  Right hip shows no signs of acute fracture  Right knee shows no signs of acute fracture  Right ankle shows an acute mildly displaced right distal one third fracture to the fibula and also medial malleolar fracture  Consulted with orthopedics they recommended foot and ankle podiatry be consulted  I consulted with foot and ankle Dr. Penn and he will see her Friday  Will send referral  Splint L&U placed  She is mostly non ambulatory at baseline so this should be sufficient for outpatient follow up  NWB for now  Discharged back to  SNF    Diagnoses as of 07/09/25 1203   Closed head injury, initial encounter   Closed fracture of distal end of right fibula, unspecified fracture morphology, initial encounter   Closed nondisplaced fracture of medial malleolus of right tibia, initial encounter       PROCEDURES:      CONSULTS:  None    CRITICAL CARE:      FINAL IMPRESSION      1. Closed head injury, initial encounter    2. Closed fracture of distal end of right fibula, unspecified fracture morphology, initial encounter    3. Closed nondisplaced fracture of medial malleolus of right tibia, initial encounter          DISPOSITION / PLAN     [unfilled]    PATIENT REFERRED TO:  J Carlos Penn DPM  [id number] [id number]  [phone]    On 7/11/2025  MAKE SURE TO CALL FOR APPOINTMENT TIME!      DISCHARGE MEDICATIONS:  New Prescriptions    No medications on file       Alfonzo Angel MD  12:03 PM    Attending Emergency Physician  [facility] EMERGENCY MEDICINE    (Please note that portions of this note were completed with a voice recognition program.  Effortswere made to edit the dictations but occasionally words are mis-transcribed.)                                                                 [1]   Family History  Problem Relation Name Age of Onset    Diabetes Mother      Heart disease Father          Alfonzo Angel MD  07/09/25 1206

## (undated) DEVICE — GOWN, SURGICAL, REINFORCED, XLARGE, X-LONG, STERILE

## (undated) DEVICE — Device

## (undated) DEVICE — DRAPE, ISOLATION, ANTIMICROBIAL, W/POUCH, IOBAN 2, STERI DRAPE, 125 X 83 IN, DISPOSABLE, STERILE

## (undated) DEVICE — STRIP, SKIN CLOSURE, STERI STRIP, REINFORCED, 1/2 X 2 IN

## (undated) DEVICE — SUTURE, MONOCRYL, 4-0, 18 IN, PS2, UNDYED

## (undated) DEVICE — SUTURE, VICRYL, 2-0, 36 IN, CT-1, UNDYED

## (undated) DEVICE — SOLUTION, IRRIGATION, X RX SODIUM CHL 0.9%, 1000ML BTL

## (undated) DEVICE — 6MM/9MM CANNULATED STEPPED DRILL BIT

## (undated) DEVICE — SPONGE, GAUZE, AVANT, STERILE, NONWOVEN, 4PLY, 4 X 4, STANDARD

## (undated) DEVICE — GLOVE, PROTEXIS PI CLASSIC, SZ-8.0, PF, PF, LF

## (undated) DEVICE — GLOVE, SURGICAL, PROTEXIS PI ORTHO, 8.0, PF, LF